# Patient Record
Sex: MALE | Race: WHITE | Employment: OTHER | URBAN - METROPOLITAN AREA
[De-identification: names, ages, dates, MRNs, and addresses within clinical notes are randomized per-mention and may not be internally consistent; named-entity substitution may affect disease eponyms.]

---

## 2017-01-27 ENCOUNTER — OFFICE VISIT (OUTPATIENT)
Dept: PAIN CLINIC | Facility: HOSPITAL | Age: 57
End: 2017-01-27
Attending: ANESTHESIOLOGY
Payer: MEDICARE

## 2017-01-27 VITALS
WEIGHT: 195 LBS | RESPIRATION RATE: 18 BRPM | BODY MASS INDEX: 31.34 KG/M2 | SYSTOLIC BLOOD PRESSURE: 125 MMHG | DIASTOLIC BLOOD PRESSURE: 85 MMHG | HEART RATE: 87 BPM | HEIGHT: 66 IN

## 2017-01-27 DIAGNOSIS — M50.30 DDD (DEGENERATIVE DISC DISEASE), CERVICAL: Primary | ICD-10-CM

## 2017-01-27 PROCEDURE — 99211 OFF/OP EST MAY X REQ PHY/QHP: CPT

## 2017-01-27 RX ORDER — HYDROCODONE BITARTRATE AND ACETAMINOPHEN 10; 325 MG/1; MG/1
1 TABLET ORAL EVERY 6 HOURS PRN
Qty: 90 TABLET | Refills: 0 | Status: SHIPPED | OUTPATIENT
Start: 2017-01-27 | End: 2017-03-24

## 2017-01-27 NOTE — CHRONIC PAIN
Follow-up Note    HISTORY OF PRESENT ILLNESS:  June Barrera is a 60year old old male, originally referred to the pain clinic by  No ref. provider found, returns to the clinic for follow up regarding neck pain.  He reports good relief from cervical ep Arthrocentesis of the right knee joint       FAMILY HISTORY:  Family History   Problem Relation Age of Onset   • Arthritis Mother    • Heart Disorder Mother    • Heart Disorder Brother    • pulmo[other] [OTHER] Father 76     leukemnia       SOCIAL HISTORY: Right Upper Extremity: Normal  Left Upper Extremity: Normal  Edema - Absent  Hoffmans:   negative  Joint Exam: Normal  TPs: Absent: lumbo-sacral paraspinous muscle TPs  Skin - normal     IMAGING:  No new relevant studies     [place] PHYSICIAN MONITORING PROGR

## 2017-01-27 NOTE — PROGRESS NOTES
GOOD SUPPORT SYSTEM    1/27/17 PATIENT PRESENTS AMBULATORY TO CPM; PATIENT STATES 90 % IMPROVEMENT OF MENG DONE 11/21/16; STATES TWO MONTHS OF RELIEF; MENG TOOK ABOUT 5 DAYS FOR IT TO START WORKING; INTERESTED IN GETTING ANOTHER EPIDURAL; PATIENT

## 2017-02-08 RX ORDER — SIMVASTATIN 40 MG
TABLET ORAL
Qty: 30 TABLET | Refills: 0 | Status: SHIPPED | OUTPATIENT
Start: 2017-02-08 | End: 2017-04-06

## 2017-02-23 ENCOUNTER — TELEPHONE (OUTPATIENT)
Dept: INTERNAL MEDICINE CLINIC | Facility: CLINIC | Age: 57
End: 2017-02-23

## 2017-02-23 NOTE — TELEPHONE ENCOUNTER
Medicare AHA scheduled for 4/6/17. Pt is asking for blood test orders. Informed orders are in the system and to fast 8-10 hours prior to lab draw.

## 2017-03-24 NOTE — PROGRESS NOTES
Blood pressure 146/82, pulse 94, weight 196 lb (88.905 kg). Patient presents today following up for his son's imprisonment and detoxification. He is very upset and is not able to sleep at night. He has been smoking again.   He reports his son will come h

## 2017-03-30 PROBLEM — F51.02 INSOMNIA DUE TO PSYCHOLOGICAL STRESS: Status: ACTIVE | Noted: 2017-03-30

## 2017-03-30 PROBLEM — F17.200 TOBACCO DEPENDENCE: Status: ACTIVE | Noted: 2017-03-30

## 2017-03-30 PROBLEM — E78.5 HYPERLIPIDEMIA: Status: ACTIVE | Noted: 2017-03-30

## 2017-04-03 ENCOUNTER — APPOINTMENT (OUTPATIENT)
Dept: LAB | Age: 57
End: 2017-04-03
Attending: FAMILY MEDICINE
Payer: MEDICARE

## 2017-04-03 PROCEDURE — 84450 TRANSFERASE (AST) (SGOT): CPT | Performed by: FAMILY MEDICINE

## 2017-04-03 PROCEDURE — 80061 LIPID PANEL: CPT | Performed by: FAMILY MEDICINE

## 2017-04-03 PROCEDURE — 84460 ALANINE AMINO (ALT) (SGPT): CPT | Performed by: FAMILY MEDICINE

## 2017-04-06 ENCOUNTER — OFFICE VISIT (OUTPATIENT)
Dept: FAMILY MEDICINE CLINIC | Facility: CLINIC | Age: 57
End: 2017-04-06

## 2017-04-06 VITALS
SYSTOLIC BLOOD PRESSURE: 110 MMHG | WEIGHT: 198 LBS | BODY MASS INDEX: 31.82 KG/M2 | HEART RATE: 94 BPM | DIASTOLIC BLOOD PRESSURE: 67 MMHG | HEIGHT: 66 IN

## 2017-04-06 DIAGNOSIS — E78.5 HYPERLIPIDEMIA, UNSPECIFIED HYPERLIPIDEMIA TYPE: ICD-10-CM

## 2017-04-06 DIAGNOSIS — Z87.39 HISTORY OF ARTHRITIS: ICD-10-CM

## 2017-04-06 DIAGNOSIS — Z00.00 MEDICARE ANNUAL WELLNESS VISIT, INITIAL: Primary | ICD-10-CM

## 2017-04-06 DIAGNOSIS — F43.21 GRIEF REACTION: ICD-10-CM

## 2017-04-06 DIAGNOSIS — G89.29 CHRONIC NECK PAIN: ICD-10-CM

## 2017-04-06 DIAGNOSIS — M54.2 CHRONIC NECK PAIN: ICD-10-CM

## 2017-04-06 PROBLEM — M19.90 ARTHRITIS: Status: ACTIVE | Noted: 2017-04-06

## 2017-04-06 PROBLEM — F43.20 GRIEF REACTION: Status: ACTIVE | Noted: 2017-04-06

## 2017-04-06 PROCEDURE — 96160 PT-FOCUSED HLTH RISK ASSMT: CPT | Performed by: FAMILY MEDICINE

## 2017-04-06 PROCEDURE — G0438 PPPS, INITIAL VISIT: HCPCS | Performed by: FAMILY MEDICINE

## 2017-04-06 RX ORDER — SIMVASTATIN 40 MG
TABLET ORAL
Qty: 90 TABLET | Refills: 3 | Status: SHIPPED | OUTPATIENT
Start: 2017-04-06 | End: 2018-05-01

## 2017-04-06 RX ORDER — ASPIRIN 81 MG/1
81 TABLET ORAL DAILY
Qty: 30 TABLET | Refills: 0 | OUTPATIENT
Start: 2017-04-06 | End: 2018-07-18

## 2017-04-06 NOTE — PROGRESS NOTES
HPI:   Roseanne York is a 60year old male who presents for a Medicare Initial Annual Wellness visit (Once after 12 month Medicare anniversery) .       Annual Physical due on 11/22/2017        Patient Care Team: Patient Care Team:  Connor Perez, DO reports that he quit smoking about 15 months ago. His smoking use included Cigarettes. He has a 10 pack-year smoking history. He does not have any smokeless tobacco history on file. He reports that he does not drink alcohol or use illicit drugs.      REVIEW trouble understanding the speaker in a large room such as at a   meeting or place of Quaker:  No   Many people I talk to seem to mumble (or don't speak clearly):  No People   get annoyed because I misunderstand what they say:  No   I misunderstand what ot SUGGESTED VACCINATIONS - Influenza, Pneumococcal, Zoster, Tetanus     There is no immunization history on file for this patient.     ASSESSMENT AND OTHER RELEVANT CHRONIC CONDITIONS:   Sigrid Reese is a 60year old male who presents for a Medi energy level? :  (pt states \"housework\" )    How would you describe your daily physical activity?: Light    How would you describe your current health state?: Good    How do you maintain positive mental well-being? :  (pt states 'bike rides\" )    If you a Assessment\" under Medicare Assessment section in Charting, test patient and document. Then, refresh your progress note to see your input here.   Cognitive Assessment     What day of the week is this?: Correct    What month is it?: Correct    What year i previous visit.          SPECIFIC DISEASE MONITORING Internal Lab or Procedure External Lab or Procedure   Annual Monitoring of Persistent     Medications (ACE/ARB, digoxin diuretics, anticonvulsants.)    Potassium  Annually POTASSIUM (P) (mmol/L)   Date [facility]

## 2017-04-06 NOTE — PATIENT INSTRUCTIONS
Roseanne Rose's SCREENING SCHEDULE   Tests on this list are recommended by your physician but may not be covered, or covered at this frequency, by your insurer. Please check with your insurance carrier before scheduling to verify coverage.     PIERRE 75     Colonoscopy Screen   Covered every 10 years- more often if abnormal Colonoscopy,3 Years due on 11/22/2019 Update Health Maintenance if applicable    Flex Sigmoidoscopy Screen  Covered every 5 years No results found for this or any previous visit.  No prescription benefits, but Medicare does not cover unless Medically needed    Zoster (Not covered by Medicare Part B) No orders found for this or any previous visit.  This may be covered with your pharmacy  prescription benefits     Recommended Websites for

## 2017-04-19 ENCOUNTER — LAB ENCOUNTER (OUTPATIENT)
Dept: LAB | Age: 57
End: 2017-04-19
Attending: FAMILY MEDICINE
Payer: MEDICARE

## 2017-04-19 DIAGNOSIS — Z87.39 HISTORY OF ARTHRITIS: ICD-10-CM

## 2017-04-19 DIAGNOSIS — Z00.00 MEDICARE ANNUAL WELLNESS VISIT, INITIAL: ICD-10-CM

## 2017-04-19 PROCEDURE — 86431 RHEUMATOID FACTOR QUANT: CPT

## 2017-04-19 PROCEDURE — 36415 COLL VENOUS BLD VENIPUNCTURE: CPT

## 2017-04-19 PROCEDURE — 85025 COMPLETE CBC W/AUTO DIFF WBC: CPT

## 2017-04-19 PROCEDURE — 93005 ELECTROCARDIOGRAM TRACING: CPT

## 2017-04-19 PROCEDURE — 86200 CCP ANTIBODY: CPT

## 2017-04-19 PROCEDURE — 93010 ELECTROCARDIOGRAM REPORT: CPT | Performed by: FAMILY MEDICINE

## 2017-04-19 NOTE — TELEPHONE ENCOUNTER
May we refill Clonazepam? Last fill 3/24/17.      Refill Protocol Appointment Criteria  · Appointment scheduled in the past 6 months or in the next 3 months  Recent Visits       Provider Department Primary Dx    1 week ago DO Marlon Wakefield

## 2017-04-19 NOTE — TELEPHONE ENCOUNTER
Current Outpatient Prescriptions:  ClonazePAM 0.5 MG Oral Tab TAKE 1-2 TABS NIGHTLY AS NEEDED FOR SLEEP.  Disp: 30 tablet Rfl: 0     Patient requesting refill for meds, Please call when ready

## 2017-04-20 RX ORDER — CLONAZEPAM 0.5 MG/1
TABLET ORAL
Qty: 30 TABLET | Refills: 0 | Status: SHIPPED | OUTPATIENT
Start: 2017-04-20 | End: 2017-10-30

## 2017-04-21 NOTE — TELEPHONE ENCOUNTER
Rx called in to pharmacy.      Approved          Disp Refills Start End       ClonazePAM 0.5 MG Oral Tab 30 tablet 0 4/20/2017        Sig:  TAKE 1-2 TABS NIGHTLY AS NEEDED FOR SLEEP.        Class:  Script printed       CORIE:  No       Authorizing Provider:

## 2017-04-25 ENCOUNTER — TELEPHONE (OUTPATIENT)
Dept: FAMILY MEDICINE CLINIC | Facility: CLINIC | Age: 57
End: 2017-04-25

## 2017-05-22 RX ORDER — ESCITALOPRAM OXALATE 10 MG/1
TABLET ORAL
Qty: 30 TABLET | Refills: 1 | Status: SHIPPED | OUTPATIENT
Start: 2017-05-22 | End: 2017-07-24

## 2017-05-22 NOTE — TELEPHONE ENCOUNTER
Signed Prescriptions Disp Refills    ESCITALOPRAM 10 MG Oral Tab 30 tablet 1      Sig: TAKE 1 TABLET BY MOUTH EVERY DAY        Authorizing Provider: Kaylen DAVIS        Ordering User: Genna Mendez           Refill approved per protocol.

## 2017-05-22 NOTE — TELEPHONE ENCOUNTER
Refill Protocol Appointment Criteria  · Appointment scheduled in the past 6 months or in the next 3 months  Recent Visits       Provider Department Primary Dx    1 month ago Robson Hope DO [facility], [address], [place] Medicare annual wellne

## 2017-07-24 RX ORDER — ESCITALOPRAM OXALATE 10 MG/1
TABLET ORAL
Qty: 90 TABLET | Refills: 3 | Status: SHIPPED | OUTPATIENT
Start: 2017-07-24 | End: 2018-10-03

## 2017-08-19 ENCOUNTER — TELEPHONE (OUTPATIENT)
Dept: FAMILY MEDICINE CLINIC | Facility: CLINIC | Age: 57
End: 2017-08-19

## 2017-08-21 RX ORDER — EPINEPHRINE 0.3 MG/.3ML
0.3 INJECTION SUBCUTANEOUS ONCE
Qty: 1 EACH | Refills: 0 | Status: SHIPPED | OUTPATIENT
Start: 2017-08-21 | End: 2019-09-13

## 2017-09-19 ENCOUNTER — TELEPHONE (OUTPATIENT)
Dept: FAMILY MEDICINE CLINIC | Facility: CLINIC | Age: 57
End: 2017-09-19

## 2017-09-19 DIAGNOSIS — M50.30 DDD (DEGENERATIVE DISC DISEASE), CERVICAL: Primary | ICD-10-CM

## 2017-09-19 NOTE — TELEPHONE ENCOUNTER
Patient needs referral to pain center for office visit as he is seeking cortisone injection. Please sign referral order if you agree, Thank you.

## 2017-10-30 ENCOUNTER — OFFICE VISIT (OUTPATIENT)
Dept: PAIN CLINIC | Facility: HOSPITAL | Age: 57
End: 2017-10-30
Attending: ANESTHESIOLOGY
Payer: MEDICARE

## 2017-10-30 VITALS
DIASTOLIC BLOOD PRESSURE: 83 MMHG | SYSTOLIC BLOOD PRESSURE: 131 MMHG | HEIGHT: 66 IN | WEIGHT: 200 LBS | BODY MASS INDEX: 32.14 KG/M2 | RESPIRATION RATE: 18 BRPM | HEART RATE: 77 BPM

## 2017-10-30 DIAGNOSIS — M50.30 DDD (DEGENERATIVE DISC DISEASE), CERVICAL: Primary | ICD-10-CM

## 2017-10-30 PROCEDURE — 99211 OFF/OP EST MAY X REQ PHY/QHP: CPT

## 2017-10-30 RX ORDER — HYDROCODONE BITARTRATE AND ACETAMINOPHEN 10; 325 MG/1; MG/1
1 TABLET ORAL EVERY 6 HOURS PRN
Qty: 90 TABLET | Refills: 0 | Status: SHIPPED | OUTPATIENT
Start: 2017-10-30 | End: 2017-11-29

## 2017-10-30 NOTE — CHRONIC PAIN
Graciela Frost is well known to the CPM with intermittent low back and neck pain from degenerative changes of his cervical and lumbar spine due to known rheumatoid arthritis and occupational and recreational activities.   He has intermittent exacerbations of

## 2018-01-11 ENCOUNTER — TELEPHONE (OUTPATIENT)
Dept: FAMILY MEDICINE CLINIC | Facility: CLINIC | Age: 58
End: 2018-01-11

## 2018-02-12 ENCOUNTER — TELEPHONE (OUTPATIENT)
Dept: FAMILY MEDICINE CLINIC | Facility: CLINIC | Age: 58
End: 2018-02-12

## 2018-03-07 ENCOUNTER — PATIENT OUTREACH (OUTPATIENT)
Dept: INTERNAL MEDICINE CLINIC | Facility: CLINIC | Age: 58
End: 2018-03-07

## 2018-04-24 ENCOUNTER — OFFICE VISIT (OUTPATIENT)
Dept: PAIN CLINIC | Facility: HOSPITAL | Age: 58
End: 2018-04-24
Attending: ANESTHESIOLOGY
Payer: MEDICARE

## 2018-04-24 ENCOUNTER — ANESTHESIA (OUTPATIENT)
Dept: PAIN CLINIC | Facility: HOSPITAL | Age: 58
End: 2018-04-24

## 2018-04-24 VITALS
WEIGHT: 200 LBS | RESPIRATION RATE: 18 BRPM | BODY MASS INDEX: 32.14 KG/M2 | DIASTOLIC BLOOD PRESSURE: 44 MMHG | HEIGHT: 66 IN | SYSTOLIC BLOOD PRESSURE: 126 MMHG

## 2018-04-24 DIAGNOSIS — R29.898 WEAKNESS OF BOTH ARMS: Primary | ICD-10-CM

## 2018-04-24 PROCEDURE — 99211 OFF/OP EST MAY X REQ PHY/QHP: CPT

## 2018-04-24 RX ORDER — HYDROCODONE BITARTRATE AND ACETAMINOPHEN 10; 325 MG/1; MG/1
1 TABLET ORAL EVERY 6 HOURS PRN
Qty: 120 TABLET | Refills: 0 | Status: SHIPPED | OUTPATIENT
Start: 2018-04-24 | End: 2018-05-24

## 2018-04-24 NOTE — CHRONIC PAIN
True Ordonez is well known to the CPM with intermittent low back and neck pain from degenerative changes of his cervical and lumbar spine due to known rheumatoid arthritis and occupational and recreational activities.   He has intermittent exacerbations of

## 2018-04-24 NOTE — PROGRESS NOTES
GOOD SUPPORT SYSTEM  1/27/17 PATIENT PRESENTS AMBULATORY TO CPM; PATIENT STATES 90 % IMPROVEMENT OF MENG DONE 11/21/16; STATES TWO MONTHS OF RELIEF; MENG TOOK ABOUT 5 DAYS FOR IT TO START WORKING; INTERESTED IN GETTING ANOTHER EPIDURAL; PATIENT IS

## 2018-04-26 ENCOUNTER — ANESTHESIA EVENT (OUTPATIENT)
Dept: PAIN CLINIC | Facility: HOSPITAL | Age: 58
End: 2018-04-26

## 2018-04-27 ENCOUNTER — TELEPHONE (OUTPATIENT)
Dept: OTHER | Age: 58
End: 2018-04-27

## 2018-04-27 ENCOUNTER — TELEPHONE (OUTPATIENT)
Dept: FAMILY MEDICINE CLINIC | Facility: CLINIC | Age: 58
End: 2018-04-27

## 2018-04-27 DIAGNOSIS — M79.601 BILATERAL ARM PAIN: Primary | ICD-10-CM

## 2018-04-27 DIAGNOSIS — M79.602 BILATERAL ARM PAIN: Primary | ICD-10-CM

## 2018-04-27 NOTE — TELEPHONE ENCOUNTER
EMG/NCV ORDERED. PLEASE CHECK WITH DR. Ashley Barnhart IF THIS IS THE ORDER SHE WANTED. ALSO PLEASE HAVE PATIENT SCHEDULE HIS MEDICARE ANNUAL PHYSICAL WITH ME THIS IN MAY OR June.

## 2018-04-27 NOTE — TELEPHONE ENCOUNTER
Managed Care--can patient schedule EMG?  Ordered 4/24/18    Please advise    Relayed to wife CSS # to call to schedule test  [facility] Visit     4/24/2018  [facility]   Ekta Dillon MD   Anesthesiology   Weakness of both

## 2018-04-27 NOTE — TELEPHONE ENCOUNTER
Please call patient for EMG scheduling--asking if test authorized (has HMO), as test was ordered by Dr. Babar Vail, not Dr. Alex Dee. Called [facility] pain mgmt office and Neuroscience office--already closed for the day.     Spoke with wife Tammy Jackson and relayed to

## 2018-04-27 NOTE — TELEPHONE ENCOUNTER
Spoke with wife--she states she will call Dr. Noelle Adler office on [address] gave her the number. They are not sure how to proceed.     It shows that order was placed by Dr. Joann Crump, but wife states her  told her that Dr. Joann Crump told him that [facility] [place] has to ord

## 2018-05-01 DIAGNOSIS — E78.00 PURE HYPERCHOLESTEROLEMIA: Primary | ICD-10-CM

## 2018-05-01 RX ORDER — SIMVASTATIN 40 MG
TABLET ORAL
Qty: 90 TABLET | Refills: 0 | Status: SHIPPED | OUTPATIENT
Start: 2018-05-01 | End: 2018-08-05

## 2018-05-01 NOTE — TELEPHONE ENCOUNTER
Appointment Information   Name: Danii Allison MRN: [id number]   Date: 5/16/2018 Status: Pablo   Appt Time: 3:00 PM Length: 60   Visit Type: EMG 2 LIMB [6571] Copay: $0.00   Provider: Usama Inman DO Department: EMG NEURO [facility]   Referral Number:   Re

## 2018-05-03 NOTE — TELEPHONE ENCOUNTER
Confirm with  office order placed for EMG is correct. Spoke to pt. Notified pt of New EMG order. Also notified pt to please schedule for his annual medicare physical with . He states he will call back at a later time to schedule. Pt voiced

## 2018-05-16 ENCOUNTER — OFFICE VISIT (OUTPATIENT)
Dept: NEUROLOGY | Facility: CLINIC | Age: 58
End: 2018-05-16

## 2018-05-16 ENCOUNTER — TELEPHONE (OUTPATIENT)
Dept: PAIN CLINIC | Facility: HOSPITAL | Age: 58
End: 2018-05-16

## 2018-05-16 VITALS — HEIGHT: 66 IN | BODY MASS INDEX: 32.14 KG/M2 | WEIGHT: 200 LBS

## 2018-05-16 DIAGNOSIS — R20.0 NUMBNESS IN BOTH HANDS: ICD-10-CM

## 2018-05-16 PROCEDURE — 95911 NRV CNDJ TEST 9-10 STUDIES: CPT | Performed by: PHYSICAL MEDICINE & REHABILITATION

## 2018-05-16 PROCEDURE — 95886 MUSC TEST DONE W/N TEST COMP: CPT | Performed by: PHYSICAL MEDICINE & REHABILITATION

## 2018-05-16 NOTE — PROCEDURES
[address]  Phone: [phone]  Fax: [phone] REPORT          Patient: Edmund Rodriguez YOB: 1960  Patient ID: [id number] Hand Dominance: right abnormal in 1 muscle(s), with the following distribution:  • The R. DELTOID had abnormal spontaneous activity. Conclusion:     1) This is an abnormal electrodiagnostic study.   2) There is electrophysiologic evidence of a bilateral mild median mono II 3.39 4.27 10.7 18.4 Wrist - Dig II 14 41   R ULNAR - Digit  V      Wrist Dig V 2.76 3.44 5.9 13.3 Wrist - Dig V 14 51   L ULNAR - Digit  V      Wrist Dig V 2.76 3.49 8.2 18.4 Wrist - Dig V 14 51   L RADIAL - Wrist      Forearm Wrist 1.61 2.24 17.0 22.7

## 2018-05-18 ENCOUNTER — TELEPHONE (OUTPATIENT)
Dept: PAIN CLINIC | Facility: HOSPITAL | Age: 58
End: 2018-05-18

## 2018-06-25 ENCOUNTER — TELEPHONE (OUTPATIENT)
Dept: PAIN CLINIC | Facility: HOSPITAL | Age: 58
End: 2018-06-25

## 2018-06-25 DIAGNOSIS — M79.641 HAND PAIN, RIGHT: Primary | ICD-10-CM

## 2018-06-26 ENCOUNTER — HOSPITAL ENCOUNTER (OUTPATIENT)
Dept: GENERAL RADIOLOGY | Age: 58
Discharge: HOME | End: 2018-06-26
Attending: ANESTHESIOLOGY
Payer: MEDICARE

## 2018-06-26 DIAGNOSIS — M79.641 HAND PAIN, RIGHT: ICD-10-CM

## 2018-06-26 PROCEDURE — 73130 X-RAY EXAM OF HAND: CPT | Performed by: ANESTHESIOLOGY

## 2018-06-29 ENCOUNTER — TELEPHONE (OUTPATIENT)
Dept: PAIN CLINIC | Facility: HOSPITAL | Age: 58
End: 2018-06-29

## 2018-06-29 NOTE — TELEPHONE ENCOUNTER
PARDEEP COMPLETED XRAY; PLS REVIEW & CALL PT WITH RESULTS;  DO YOU WANT HIM TO MAKE AN APPOINTMENT OR SET UP INJECTION

## 2018-07-09 ENCOUNTER — TELEPHONE (OUTPATIENT)
Dept: PAIN CLINIC | Facility: HOSPITAL | Age: 58
End: 2018-07-09

## 2018-07-16 ENCOUNTER — TELEPHONE (OUTPATIENT)
Dept: PAIN CLINIC | Facility: HOSPITAL | Age: 58
End: 2018-07-16

## 2018-07-17 ENCOUNTER — TELEPHONE (OUTPATIENT)
Dept: PAIN CLINIC | Facility: HOSPITAL | Age: 58
End: 2018-07-17

## 2018-07-18 ENCOUNTER — OFFICE VISIT (OUTPATIENT)
Dept: PAIN CLINIC | Facility: HOSPITAL | Age: 58
End: 2018-07-18
Attending: ANESTHESIOLOGY
Payer: MEDICARE

## 2018-07-18 DIAGNOSIS — G89.4 CHRONIC PAIN SYNDROME: Primary | ICD-10-CM

## 2018-07-18 PROCEDURE — 99211 OFF/OP EST MAY X REQ PHY/QHP: CPT

## 2018-07-18 RX ORDER — HYDROCODONE BITARTRATE AND ACETAMINOPHEN 10; 325 MG/1; MG/1
1 TABLET ORAL EVERY 6 HOURS PRN
COMMUNITY
End: 2018-07-18

## 2018-07-18 RX ORDER — METHYLPREDNISOLONE 4 MG/1
TABLET ORAL
Qty: 21 TABLET | Refills: 0 | Status: SHIPPED | OUTPATIENT
Start: 2018-07-18 | End: 2018-07-24

## 2018-07-18 RX ORDER — HYDROCODONE BITARTRATE AND ACETAMINOPHEN 10; 325 MG/1; MG/1
1 TABLET ORAL EVERY 6 HOURS PRN
Qty: 120 TABLET | Refills: 0 | Status: SHIPPED | OUTPATIENT
Start: 2018-07-18 | End: 2018-08-17

## 2018-07-18 NOTE — CHRONIC PAIN
MEDICATION EVALUATION  HISTORY OF PRESENT ILLNESS:  Elijah Gery is a 59year old old male with history of Chronic pain syndrome  (primary encounter diagnosis) who returns for medication evaluation. Patient continues to report right thumb pain.  He also reaction     Chronic neck pain    Past Medical History:   Diagnosis Date   • Back problem    • Broken neck (HCC)    • Rheumatoid arteritis        SURGICAL HISTORY:  Past Surgical History:  No date: KNEE ARTHROSCOPY      Comment: Right knee arthroscopy  No Quadriceps 5/5 5/5   Foot DF 5/5 5/5   Foot EHL 5/5 5/5   Gastrocnemius 5/5 5/5       Temperature:  normal to touch bilateral upper and lower extremities  Sensation (light touch/pinprick/temperature):   Right Lower Extremity:  Normal  Left Lower Extremit Patient verbalized understanding.  Informed patient that no refills will be given over the phone. Instructed patient he is responsible for medications and refills. Patient verbalized understanding.      Pt will return to clinic for follow up if pain in low

## 2018-07-24 ENCOUNTER — OFFICE VISIT (OUTPATIENT)
Dept: FAMILY MEDICINE CLINIC | Facility: CLINIC | Age: 58
End: 2018-07-24

## 2018-07-24 VITALS
DIASTOLIC BLOOD PRESSURE: 78 MMHG | WEIGHT: 207 LBS | HEIGHT: 66 IN | SYSTOLIC BLOOD PRESSURE: 138 MMHG | BODY MASS INDEX: 33.27 KG/M2 | HEART RATE: 80 BPM

## 2018-07-24 DIAGNOSIS — G89.29 CHRONIC PAIN OF RIGHT THUMB: Primary | ICD-10-CM

## 2018-07-24 DIAGNOSIS — N52.8 OTHER MALE ERECTILE DYSFUNCTION: ICD-10-CM

## 2018-07-24 DIAGNOSIS — M79.644 CHRONIC PAIN OF RIGHT THUMB: Primary | ICD-10-CM

## 2018-07-24 PROCEDURE — 99213 OFFICE O/P EST LOW 20 MIN: CPT | Performed by: FAMILY MEDICINE

## 2018-07-24 PROCEDURE — G0463 HOSPITAL OUTPT CLINIC VISIT: HCPCS | Performed by: FAMILY MEDICINE

## 2018-07-24 RX ORDER — METHYLPREDNISOLONE 4 MG/1
TABLET ORAL
Qty: 21 TABLET | Refills: 0 | Status: SHIPPED | OUTPATIENT
Start: 2018-07-24 | End: 2018-07-31

## 2018-07-24 NOTE — PROGRESS NOTES
Blood pressure 138/78, pulse 80, height 5' 6\" (1.676 m), weight 207 lb (93.9 kg). Presents today complaining of low back pain that began when he was working on his bicycle. He has chronic low back pain issues.   He denies pain down his legs at this arnaldo

## 2018-07-27 ENCOUNTER — OFFICE VISIT (OUTPATIENT)
Dept: FAMILY MEDICINE CLINIC | Facility: CLINIC | Age: 58
End: 2018-07-27

## 2018-07-27 ENCOUNTER — TELEPHONE (OUTPATIENT)
Dept: OTHER | Age: 58
End: 2018-07-27

## 2018-07-27 VITALS
WEIGHT: 205 LBS | HEIGHT: 66 IN | SYSTOLIC BLOOD PRESSURE: 145 MMHG | HEART RATE: 79 BPM | RESPIRATION RATE: 16 BRPM | DIASTOLIC BLOOD PRESSURE: 87 MMHG | BODY MASS INDEX: 32.95 KG/M2

## 2018-07-27 DIAGNOSIS — M54.50 ACUTE BILATERAL LOW BACK PAIN WITHOUT SCIATICA: Primary | ICD-10-CM

## 2018-07-27 PROCEDURE — 99213 OFFICE O/P EST LOW 20 MIN: CPT | Performed by: FAMILY MEDICINE

## 2018-07-27 PROCEDURE — G0463 HOSPITAL OUTPT CLINIC VISIT: HCPCS | Performed by: FAMILY MEDICINE

## 2018-07-27 RX ORDER — PREDNISONE 20 MG/1
20 TABLET ORAL
Qty: 15 TABLET | Refills: 0 | Status: SHIPPED | OUTPATIENT
Start: 2018-07-27 | End: 2018-07-31

## 2018-07-27 NOTE — PROGRESS NOTES
HPI:    Patient ID: Jada Calixto is a 59year old male. HPI    Review of Systems         Current Outpatient Prescriptions:  predniSONE 20 MG Oral Tab Take 1 tablet (20 mg total) by mouth 3 (three) times daily.  Disp: 15 tablet Rfl: 0   Diclofenac Sodium

## 2018-07-27 NOTE — TELEPHONE ENCOUNTER
Pt informed of [facility]'s response and states can make it to [facility] by 10:20 am. CSS, please add pt accordingly at 10:20 am with [facility] [place] today as RN unable to reach  to assist in adding.

## 2018-07-27 NOTE — PROGRESS NOTES
Blood pressure 145/87, pulse 79, resp. rate 16, height 5' 6\" (1.676 m), weight 205 lb (93 kg). Following up for back pain reports that he does well for the first 2 days of the Medrol Dosepak that he relies on Norco for pain relief.   He does not like to

## 2018-07-27 NOTE — TELEPHONE ENCOUNTER
Please advise. Thank you.  Please reply to pool: EM RN TRIAGE    Pt and wife Sheela Sánchez) calling on the same line  (Name and  of pt verified) with condition update as instructed per MD. Pt states that his low back pain is slightly improved (7/10 on pain s

## 2018-08-07 RX ORDER — SIMVASTATIN 40 MG
TABLET ORAL
Qty: 90 TABLET | Refills: 0 | Status: SHIPPED | OUTPATIENT
Start: 2018-08-07 | End: 2018-10-03

## 2018-09-13 ENCOUNTER — OFFICE VISIT (OUTPATIENT)
Dept: SURGERY | Facility: CLINIC | Age: 58
End: 2018-09-13

## 2018-09-13 DIAGNOSIS — M79.641 PAIN IN RIGHT HAND: Primary | ICD-10-CM

## 2018-09-13 DIAGNOSIS — M19.041 PRIMARY OSTEOARTHRITIS OF RIGHT HAND: ICD-10-CM

## 2018-09-13 PROCEDURE — 99203 OFFICE O/P NEW LOW 30 MIN: CPT | Performed by: PLASTIC SURGERY

## 2018-09-13 PROCEDURE — G0463 HOSPITAL OUTPT CLINIC VISIT: HCPCS | Performed by: PLASTIC SURGERY

## 2018-09-13 NOTE — H&P
Edwar Mack is a 59year old male that presents with Patient presents with:  Pain: R TH  .    REFERRED BY:  Olena Ibarra      Pacemaker: No  Latex Allergy: no  Coumadin: No  Plavix: No  Other anticoagulants: No  Cardiac stents: No    HAND DOMINANCE: ARTHROSCOPY      Comment:  Right knee arthroscopy  No date: OTHER SURGICAL HISTORY      Comment:  Arthrocentesis of the right knee joint  Social History    Socioeconomic History      Marital status:       Spouse name: Not on file      Number of chil Not on file    Family History   Problem Relation Age of Onset   • Arthritis Mother    • Heart Disorder Mother    • Other (pulmo) Father 76        leukemnia   • Heart Disorder Brother        PHYSICAL EXAM:   CONSTITUTIONAL: Overall appearance - Normal  HEEN

## 2018-09-17 ENCOUNTER — OFFICE VISIT (OUTPATIENT)
Dept: SURGERY | Facility: CLINIC | Age: 58
End: 2018-09-17

## 2018-09-17 DIAGNOSIS — M79.601 PAIN OF RIGHT UPPER EXTREMITY: ICD-10-CM

## 2018-09-17 DIAGNOSIS — M25.641 JOINT STIFFNESS OF HAND, RIGHT: Primary | ICD-10-CM

## 2018-09-17 PROCEDURE — 97166 OT EVAL MOD COMPLEX 45 MIN: CPT | Performed by: OCCUPATIONAL THERAPIST

## 2018-09-17 PROCEDURE — 97018 PARAFFIN BATH THERAPY: CPT | Performed by: OCCUPATIONAL THERAPIST

## 2018-09-17 NOTE — PROGRESS NOTES
OCCUPATIONAL THERAPY EVALUATION:   True Ordonez   [id number]       SUBJECTIVE:    HX of Injury: Right thumb pain. Chief Complaint:   Right thumb pain.     Precautions: None  Premorbid Functional Status: Independent w/ Occ. duties, Independent w/ driving / the plan of care. MIKY Beaver     I have reviewed the treatment plan and concur.    Jj Barajas MD

## 2018-09-20 ENCOUNTER — APPOINTMENT (OUTPATIENT)
Dept: SURGERY | Facility: CLINIC | Age: 58
End: 2018-09-20

## 2018-09-20 DIAGNOSIS — M25.641 JOINT STIFFNESS OF HAND, RIGHT: Primary | ICD-10-CM

## 2018-09-20 DIAGNOSIS — M79.601 PAIN OF RIGHT UPPER EXTREMITY: ICD-10-CM

## 2018-09-20 PROCEDURE — 97018 PARAFFIN BATH THERAPY: CPT | Performed by: OCCUPATIONAL THERAPIST

## 2018-09-20 PROCEDURE — 97022 WHIRLPOOL THERAPY: CPT | Performed by: OCCUPATIONAL THERAPIST

## 2018-09-24 NOTE — PROGRESS NOTES
Subjective: I am doing much better, glad I came. Objective:     Current level of performance:  ADL: Independent with self care task needs. Work: Retired [employer]  Leisure: Builds Electric Bikes. Measurements/Tests:  ROM:         WFL.          Treatmen

## 2018-10-03 ENCOUNTER — OFFICE VISIT (OUTPATIENT)
Dept: FAMILY MEDICINE CLINIC | Facility: CLINIC | Age: 58
End: 2018-10-03

## 2018-10-03 VITALS
SYSTOLIC BLOOD PRESSURE: 122 MMHG | HEIGHT: 66 IN | WEIGHT: 193.25 LBS | DIASTOLIC BLOOD PRESSURE: 76 MMHG | HEART RATE: 82 BPM | BODY MASS INDEX: 31.06 KG/M2

## 2018-10-03 DIAGNOSIS — Z00.00 MEDICARE ANNUAL WELLNESS VISIT, INITIAL: ICD-10-CM

## 2018-10-03 DIAGNOSIS — Z00.00 ENCOUNTER FOR ANNUAL HEALTH EXAMINATION: ICD-10-CM

## 2018-10-03 DIAGNOSIS — M47.812 FACET ARTHRITIS OF CERVICAL REGION: ICD-10-CM

## 2018-10-03 DIAGNOSIS — R53.83 OTHER FATIGUE: ICD-10-CM

## 2018-10-03 DIAGNOSIS — Z12.5 PROSTATE CANCER SCREENING: ICD-10-CM

## 2018-10-03 DIAGNOSIS — E78.00 PURE HYPERCHOLESTEROLEMIA: Primary | ICD-10-CM

## 2018-10-03 DIAGNOSIS — M47.816 FACET ARTHRITIS OF LUMBAR REGION: ICD-10-CM

## 2018-10-03 PROCEDURE — G0439 PPPS, SUBSEQ VISIT: HCPCS | Performed by: FAMILY MEDICINE

## 2018-10-03 PROCEDURE — 90686 IIV4 VACC NO PRSV 0.5 ML IM: CPT | Performed by: FAMILY MEDICINE

## 2018-10-03 PROCEDURE — G0008 ADMIN INFLUENZA VIRUS VAC: HCPCS | Performed by: FAMILY MEDICINE

## 2018-10-03 PROCEDURE — 96160 PT-FOCUSED HLTH RISK ASSMT: CPT | Performed by: FAMILY MEDICINE

## 2018-10-03 NOTE — PATIENT INSTRUCTIONS
Fall Prevention  Falls often occur due to slipping, tripping or losing your balance. Millions of people fall every year and injure themselves. Here are ways to reduce your risk of falling again.   · Think about your fall, was there anything that caused yo · Use night lights. · Go over all your medicines with a pharmacist or other healthcare provider to see if any of them could make you more likely to fall. Date Last Reviewed: 4/1/2018  © 3256-5641 [employer] [address]  [address] [facility] Electrocardiogram date04/19/2017 Routine EKG is not a screening covered service except at the [place] to Medicare Visit    Abdominal aortic aneurysm screening (once between ages 73-68)  No results found for this or any previous visit.  Limited to patients Hepatitis B for Moderate/High Risk No orders found for this or any previous visit.  Medium/high risk factors:   End-stage renal disease   Hemophiliacs who received Factor VIII or IX concentrates   Clients of institutions for the mentally retarded   Persons

## 2018-10-03 NOTE — PROGRESS NOTES
HPI:   Leo Sinclair is a 59year old male who presents for a Medicare Subsequent Annual Wellness visit (Pt already had Initial Annual Wellness).       His last annual assessment has been over 1 year: Annual Physical due on 04/06/2018         Fall/Risk [place] was screened for Alcohol abuse and had a score of 0 so is at low risk.      Patient Care Team: Patient Care Team:  Francy Hardy DO as PCP - General (Family Medicine)  Josué Menjivar MD (Anesthesiology)  Juma Lai MD (Anesthesiology)    Tere Narayan reports that he does not drink alcohol or use drugs.      REVIEW OF SYSTEMS:   GENERAL: feels well otherwise  SKIN: denies any unusual skin lesions  EYES: denies blurred vision or double vision  HEENT: denies nasal congestion, sinus pain or ST  LUNGS: denie what they say:  No   I misunderstand what others are saying and make inappropriate responses:  No I avoid social activities because I cannot hear well and fear I will reply improperly:  No   Family members and friends have told me they think I may have hea 59year old male who presents for a Medicare Assessment. PLAN SUMMARY:   Diagnoses and all orders for this visit:    Pure hypercholesterolemia  -     TSH W REFLEX TO FREE T4; Future  -     LIPID PANEL; Future  -     AST (SGOT);  Future  -     ALT (SGPT) Screen every 10 years Colonoscopy due on 11/22/2019 Update [facility] if applicable    Flex Sigmoidoscopy Screen every 10 years No results found for this or any previous visit. No flowsheet data found.      Fecal Occult Blood Annually No results fou wellness visit, initial    - EKG 12-LEAD  - OPTOMETRY - INTERNAL    5. Facet arthritis of cervical region ([address]  TYLNENOL PRN     6.  Facet arthritis of lumbar region ([address]  TYLENOL PRN

## 2018-12-10 ENCOUNTER — OFFICE VISIT (OUTPATIENT)
Dept: PAIN CLINIC | Facility: HOSPITAL | Age: 58
End: 2018-12-10
Attending: ANESTHESIOLOGY
Payer: MEDICARE

## 2018-12-10 VITALS
WEIGHT: 200 LBS | RESPIRATION RATE: 18 BRPM | HEART RATE: 87 BPM | BODY MASS INDEX: 32.14 KG/M2 | HEIGHT: 66 IN | SYSTOLIC BLOOD PRESSURE: 132 MMHG | DIASTOLIC BLOOD PRESSURE: 86 MMHG

## 2018-12-10 DIAGNOSIS — M18.11 DEGENERATIVE ARTHRITIS OF THUMB, RIGHT: Primary | ICD-10-CM

## 2018-12-10 PROCEDURE — 99211 OFF/OP EST MAY X REQ PHY/QHP: CPT

## 2018-12-10 RX ORDER — HYDROCODONE BITARTRATE AND ACETAMINOPHEN 10; 325 MG/1; MG/1
1 TABLET ORAL EVERY 8 HOURS PRN
Qty: 90 TABLET | Refills: 0 | Status: SHIPPED | OUTPATIENT
Start: 2018-12-10 | End: 2019-01-09

## 2018-12-10 NOTE — CHRONIC PAIN
True Ordonez is well known to the CPM with intermittent low back and neck pain from degenerative changes of his cervical and lumbar spine due to known rheumatoid arthritis and occupational and recreational activities.   He has intermittent exacerbations of as-needed basis when his pain was severe.

## 2018-12-10 NOTE — PROGRESS NOTES
12/10/18  PRESENTS AMBULATORY TO CPM;  RETURNS-LAST O.V. 7/18/18  FOR CHRONIC NECK PAIN;  PT C/O THUMB PAIN  5/10;  \"MY NECK IS GOOD, ITS MY RT THUMB\";  PT STATES HE SAW A HAND SPECIALIST , WAS TOLD ARTHRITIS;  PT ALSO PURCHASED A HOT WAX MACHINE WHICH G

## 2018-12-19 ENCOUNTER — OFFICE VISIT (OUTPATIENT)
Dept: PHYSICAL THERAPY | Age: 58
End: 2018-12-19
Attending: FAMILY MEDICINE
Payer: MEDICARE

## 2018-12-19 DIAGNOSIS — M18.11 DEGENERATIVE ARTHRITIS OF THUMB, RIGHT: ICD-10-CM

## 2018-12-19 PROCEDURE — 97110 THERAPEUTIC EXERCISES: CPT | Performed by: OCCUPATIONAL THERAPIST

## 2018-12-19 PROCEDURE — 97165 OT EVAL LOW COMPLEX 30 MIN: CPT | Performed by: OCCUPATIONAL THERAPIST

## 2018-12-19 NOTE — PROGRESS NOTES
OCCUPATIONAL THERAPY UPPER EXTREMITY EVALUATION   Referring Physician: Dr. Marina Rodriguez  Diagnosis: Degenerative arthritis of thumb, right (M18.11)      Date of onset: June 2018 Date of Service: 12/19/2018       PATIENT SUMMARY   Sundeep Laguerre is a 59year old y medical, social and therapy history as well as prior level of function.     Precautions: None      (Reviewed precautions with patient including post surgical status, pacemaker, diabetes and so forth, if none selected patient reports no applicable conditions Total Treatment Time: 45 min       PLAN OF CARE   Goals:    1. Pt will be independent and compliant with comprehensive HEP to maximize progress achieved in OT.   2. Pt complaints of pain will decrease at worst to 0-1/10 in order to return to ADL's with gre Date____________________    Certification From: 65/37/6988  To:3/19/2019

## 2019-03-21 ENCOUNTER — PATIENT OUTREACH (OUTPATIENT)
Dept: CASE MANAGEMENT | Age: 59
End: 2019-03-21

## 2019-03-21 NOTE — PROGRESS NOTES
Outreached to patient in regards to scheduling Medicare Annual exam (AHA). Patient did not want to schedule right now, he stated he just saw doctor in October for this and too soon to schedule.  Patient said he will call back to schedule when closer to due

## 2019-07-23 ENCOUNTER — OFFICE VISIT (OUTPATIENT)
Dept: PAIN CLINIC | Facility: HOSPITAL | Age: 59
End: 2019-07-23
Attending: ANESTHESIOLOGY
Payer: MEDICARE

## 2019-07-23 VITALS
RESPIRATION RATE: 18 BRPM | SYSTOLIC BLOOD PRESSURE: 129 MMHG | BODY MASS INDEX: 32.14 KG/M2 | HEIGHT: 66 IN | WEIGHT: 200 LBS | HEART RATE: 83 BPM | DIASTOLIC BLOOD PRESSURE: 81 MMHG

## 2019-07-23 DIAGNOSIS — M50.30 DDD (DEGENERATIVE DISC DISEASE), CERVICAL: ICD-10-CM

## 2019-07-23 DIAGNOSIS — M18.11 DEGENERATIVE ARTHRITIS OF THUMB, RIGHT: Primary | ICD-10-CM

## 2019-07-23 PROCEDURE — 99211 OFF/OP EST MAY X REQ PHY/QHP: CPT

## 2019-07-23 RX ORDER — HYDROCODONE BITARTRATE AND ACETAMINOPHEN 10; 325 MG/1; MG/1
1 TABLET ORAL EVERY 8 HOURS PRN
Qty: 90 TABLET | Refills: 0 | Status: SHIPPED | OUTPATIENT
Start: 2019-07-23 | End: 2020-08-19

## 2019-07-23 NOTE — CHRONIC PAIN
InHISTORY OF PRESENT ILLNESS:  Dennis Mackey is a 25-year-old man known to the Center for pain management with chronic neck and low back pain from degenerative changes and bulging disks.   His back pain is a result of rheumatoid arthritis and traumatic arth Negative  Musculoskeletal: As above  Neurological: As above    MEDICAL HISTORY:  Patient Active Problem List:     Neck pain     Hyperlipidemia     Tobacco dependence     Insomnia due to psychological stress     Arthritis     Chronic pain     Prostate canc Attends meetings of clubs or organizations: Not on file        Relationship status: Not on file      Intimate partner violence:        Fear of current or ex partner: Not on file        Emotionally abused: Not on file        Physically abused: Not on file 120 No   Left Extension 0 No     MOTOR EXAMINATION:  UPPER EXTREMITY      LEFT RIGHT   Deltoid 5/5 5/5   Biceps 5/5 5/5   Triceps 5/5 5/5   Brachioradialis 5/5 5/5   Wrist Flexors 5/5 5/5   Wrist Extensors 5/5 5/5   Intrinsic Hand 5/5 5/5    5/5 5/5

## 2019-09-13 ENCOUNTER — TELEPHONE (OUTPATIENT)
Dept: FAMILY MEDICINE CLINIC | Facility: CLINIC | Age: 59
End: 2019-09-13

## 2019-09-13 RX ORDER — EPINEPHRINE 0.3 MG/.3ML
0.3 INJECTION SUBCUTANEOUS ONCE
Qty: 1 EACH | Refills: 0 | Status: SHIPPED | OUTPATIENT
Start: 2019-09-13 | End: 2019-09-13

## 2019-09-13 NOTE — TELEPHONE ENCOUNTER
Pt's wife would like an to refill Rx epinephrine, they had it for 2 years and its cloudy. They also used it yesterday after pt was stung by a bee.  Please advise

## 2019-09-30 ENCOUNTER — TELEPHONE (OUTPATIENT)
Dept: GASTROENTEROLOGY | Facility: CLINIC | Age: 59
End: 2019-09-30

## 2019-09-30 NOTE — TELEPHONE ENCOUNTER
----- Message from Barry Landis RN sent at 11/23/2016  6:01 PM CST -----  Regarding: colonoscopy recall  Colonoscopy recall due 3 yrs, 11/22/2019

## 2019-11-20 ENCOUNTER — TELEPHONE (OUTPATIENT)
Dept: GASTROENTEROLOGY | Facility: CLINIC | Age: 59
End: 2019-11-20

## 2019-11-20 DIAGNOSIS — Z12.11 COLON CANCER SCREENING: Primary | ICD-10-CM

## 2019-11-20 DIAGNOSIS — Z86.010 HISTORY OF COLON POLYPS: ICD-10-CM

## 2019-11-20 RX ORDER — EPINEPHRINE 0.3 MG/.3ML
INJECTION SUBCUTANEOUS
Refills: 0 | COMMUNITY
Start: 2019-09-16

## 2019-11-20 NOTE — TELEPHONE ENCOUNTER
Last Procedure, Date, MD:  11/22/2016 Dr. Jayesh Bonds  Last Diagnosis:  Tubular adenoma, internal hemorrhoids, diverticular disease  Recalled for (months or years): 3 years   Sedation used previously:  MAC  Last Prep Used (if known):  Miralax/Gatorade  Quality o

## 2019-11-20 NOTE — TELEPHONE ENCOUNTER
Pts wife Sherryle Masker called to schedule 3 year repeat colonoscopy per letter received in mail. No recall appts available. Please call.

## 2019-11-21 NOTE — TELEPHONE ENCOUNTER
The patient's chart has been reviewed. Okay to schedule pt for 3 year colonoscopy recall r/t screening for colon cancer, history of colon polyps with Dr. Jayesh Bonds.      Advise MAC sedation per previous procedure with split dose Colyte or equivalent (eRx) prepa

## 2019-11-22 ENCOUNTER — TELEPHONE (OUTPATIENT)
Dept: FAMILY MEDICINE CLINIC | Facility: CLINIC | Age: 59
End: 2019-11-22

## 2019-11-22 NOTE — TELEPHONE ENCOUNTER
Scheduled for:  Colonoscopy 20322  Provider Name: Dr. Jayesh Bonds  Date:  2/10/20  Location:  [facility]  Sedation:  MAC  Time:   0830 (pt is aware to arrive at 0730)   Prep:  Colyte, mailed 11/25/19  Meds/Allergies Reconciled?:  Physician reviewed   Diagnosis with cod

## 2020-02-10 ENCOUNTER — ANESTHESIA (OUTPATIENT)
Dept: ENDOSCOPY | Facility: HOSPITAL | Age: 60
End: 2020-02-10
Payer: MEDICARE

## 2020-02-10 ENCOUNTER — HOSPITAL ENCOUNTER (OUTPATIENT)
Facility: HOSPITAL | Age: 60
Setting detail: HOSPITAL OUTPATIENT SURGERY
Discharge: HOME | End: 2020-02-10
Attending: INTERNAL MEDICINE | Admitting: INTERNAL MEDICINE
Payer: MEDICARE

## 2020-02-10 ENCOUNTER — ANESTHESIA EVENT (OUTPATIENT)
Dept: ENDOSCOPY | Facility: HOSPITAL | Age: 60
End: 2020-02-10
Payer: MEDICARE

## 2020-02-10 DIAGNOSIS — Z12.11 COLON CANCER SCREENING: ICD-10-CM

## 2020-02-10 DIAGNOSIS — Z86.010 HISTORY OF COLON POLYPS: ICD-10-CM

## 2020-02-10 PROCEDURE — G0105 COLORECTAL SCRN; HI RISK IND: HCPCS | Performed by: INTERNAL MEDICINE

## 2020-02-10 PROCEDURE — 0DJD8ZZ INSPECTION OF LOWER INTESTINAL TRACT, VIA NATURAL OR ARTIFICIAL OPENING ENDOSCOPIC: ICD-10-PCS | Performed by: INTERNAL MEDICINE

## 2020-02-10 RX ORDER — SODIUM CHLORIDE, SODIUM LACTATE, POTASSIUM CHLORIDE, CALCIUM CHLORIDE 600; 310; 30; 20 MG/100ML; MG/100ML; MG/100ML; MG/100ML
INJECTION, SOLUTION INTRAVENOUS CONTINUOUS
Status: DISCONTINUED | OUTPATIENT
Start: 2020-02-10 | End: 2020-02-10

## 2020-02-10 RX ORDER — NALOXONE HYDROCHLORIDE 0.4 MG/ML
80 INJECTION, SOLUTION INTRAMUSCULAR; INTRAVENOUS; SUBCUTANEOUS AS NEEDED
Status: DISCONTINUED | OUTPATIENT
Start: 2020-02-10 | End: 2020-02-10

## 2020-02-10 RX ADMIN — SODIUM CHLORIDE, SODIUM LACTATE, POTASSIUM CHLORIDE, CALCIUM CHLORIDE: 600; 310; 30; 20 INJECTION, SOLUTION INTRAVENOUS at 09:18:00

## 2020-02-10 NOTE — H&P
History & Physical Examination    Patient Name: Cosme Trujillo  MRN: [id number]  [facility]: [id number]  YOB: 1960    Diagnosis: colon polyp history      HYDROcodone-acetaminophen  MG Oral Tab, Take 1 tablet by mouth every 8 (eight) hours as Physical done within the last 30 days. Any changes noted above. Haroon Henriquez.  Les  2/10/2020  8:50 AM

## 2020-02-10 NOTE — ANESTHESIA PREPROCEDURE EVALUATION
Anesthesia PreOp Note    HPI:     Olena Engle is a 62year old male who presents for preoperative consultation requested by: Joselyn Johnson MD    Date of Surgery: 2/10/2020    Procedure(s):  COLONOSCOPY  Indication: Colon cancer screening, History o Intravenous, Continuous, Perfecto Rivas Anayeli Irwin MD, Last Rate: 20 mL/hr at 02/10/20 6581    No current [facility]-ordered outpatient medications on file.         Other                   ANAPHYLAXIS    Comment:Bee Sting    Family History   Problem Relation Age of Onset on file    Other Topics      Concerns:         Service: Not Asked        Blood Transfusions: Not Asked        Caffeine Concern: Yes          Coffee, 4 cups daily.         Occupational Exposure: Not Asked        Hobby Hazards: Not Asked        Sleep Patient  Discussed plan with:  CRNA and surgeon      I have informed Gloryjenna Turkjuan pablo and/or legal guardian or family member of the nature of the anesthetic plan, benefits, risks including possible dental damage if relevant, major complications, and any alt

## 2020-02-11 VITALS
RESPIRATION RATE: 20 BRPM | HEIGHT: 66 IN | SYSTOLIC BLOOD PRESSURE: 119 MMHG | HEART RATE: 73 BPM | DIASTOLIC BLOOD PRESSURE: 79 MMHG | WEIGHT: 190 LBS | OXYGEN SATURATION: 97 % | BODY MASS INDEX: 30.53 KG/M2

## 2020-02-20 ENCOUNTER — HOSPITAL ENCOUNTER (OUTPATIENT)
Dept: GENERAL RADIOLOGY | Age: 60
Discharge: HOME | End: 2020-02-20
Attending: FAMILY MEDICINE
Payer: MEDICARE

## 2020-02-20 ENCOUNTER — OFFICE VISIT (OUTPATIENT)
Dept: FAMILY MEDICINE CLINIC | Facility: CLINIC | Age: 60
End: 2020-02-20
Payer: COMMERCIAL

## 2020-02-20 ENCOUNTER — LAB ENCOUNTER (OUTPATIENT)
Dept: LAB | Age: 60
End: 2020-02-20
Attending: FAMILY MEDICINE
Payer: MEDICARE

## 2020-02-20 VITALS
HEART RATE: 80 BPM | BODY MASS INDEX: 33.75 KG/M2 | SYSTOLIC BLOOD PRESSURE: 131 MMHG | DIASTOLIC BLOOD PRESSURE: 90 MMHG | HEIGHT: 66 IN | WEIGHT: 210 LBS

## 2020-02-20 DIAGNOSIS — M79.641 PAIN IN BOTH HANDS: ICD-10-CM

## 2020-02-20 DIAGNOSIS — M79.642 PAIN IN BOTH HANDS: ICD-10-CM

## 2020-02-20 DIAGNOSIS — Z12.5 PROSTATE CANCER SCREENING: ICD-10-CM

## 2020-02-20 DIAGNOSIS — E78.00 PURE HYPERCHOLESTEROLEMIA: ICD-10-CM

## 2020-02-20 DIAGNOSIS — E78.00 PURE HYPERCHOLESTEROLEMIA: Primary | ICD-10-CM

## 2020-02-20 LAB
ALBUMIN SERPL-MCNC: 4 G/DL (ref 3.4–5)
ALBUMIN/GLOB SERPL: 1.4 {RATIO} (ref 1–2)
ALP LIVER SERPL-CCNC: 73 U/L (ref 45–117)
ALT SERPL-CCNC: 23 U/L (ref 16–61)
ANION GAP SERPL CALC-SCNC: 7 MMOL/L (ref 0–18)
AST SERPL-CCNC: 13 U/L (ref 15–37)
BASOPHILS # BLD AUTO: 0.04 X10(3) UL (ref 0–0.2)
BASOPHILS NFR BLD AUTO: 0.6 %
BILIRUB SERPL-MCNC: 0.3 MG/DL (ref 0.1–2)
BUN BLD-MCNC: 20 MG/DL (ref 7–18)
BUN/CREAT SERPL: 23 (ref 10–20)
CALCIUM BLD-MCNC: 9.1 MG/DL (ref 8.5–10.1)
CHLORIDE SERPL-SCNC: 108 MMOL/L (ref 98–112)
CHOLEST SMN-MCNC: 210 MG/DL (ref ?–200)
CO2 SERPL-SCNC: 25 MMOL/L (ref 21–32)
COMPLEXED PSA SERPL-MCNC: 2.44 NG/ML (ref ?–4)
CREAT BLD-MCNC: 0.87 MG/DL (ref 0.7–1.3)
DEPRECATED RDW RBC AUTO: 39.2 FL (ref 35.1–46.3)
EOSINOPHIL # BLD AUTO: 0.1 X10(3) UL (ref 0–0.7)
EOSINOPHIL NFR BLD AUTO: 1.6 %
ERYTHROCYTE [DISTWIDTH] IN BLOOD BY AUTOMATED COUNT: 13.1 % (ref 11–15)
GLOBULIN PLAS-MCNC: 2.9 G/DL (ref 2.8–4.4)
GLUCOSE BLD-MCNC: 94 MG/DL (ref 70–99)
HCT VFR BLD AUTO: 43.2 % (ref 39–53)
HDLC SERPL-MCNC: 59 MG/DL (ref 40–59)
HGB BLD-MCNC: 14 G/DL (ref 13–17.5)
IMM GRANULOCYTES # BLD AUTO: 0.02 X10(3) UL (ref 0–1)
IMM GRANULOCYTES NFR BLD: 0.3 %
LDLC SERPL CALC-MCNC: 143 MG/DL (ref ?–100)
LYMPHOCYTES # BLD AUTO: 1.43 X10(3) UL (ref 1–4)
LYMPHOCYTES NFR BLD AUTO: 22.6 %
M PROTEIN MFR SERPL ELPH: 6.9 G/DL (ref 6.4–8.2)
MCH RBC QN AUTO: 26.7 PG (ref 26–34)
MCHC RBC AUTO-ENTMCNC: 32.4 G/DL (ref 31–37)
MCV RBC AUTO: 82.4 FL (ref 80–100)
MONOCYTES # BLD AUTO: 0.53 X10(3) UL (ref 0.1–1)
MONOCYTES NFR BLD AUTO: 8.4 %
NEUTROPHILS # BLD AUTO: 4.2 X10 (3) UL (ref 1.5–7.7)
NEUTROPHILS # BLD AUTO: 4.2 X10(3) UL (ref 1.5–7.7)
NEUTROPHILS NFR BLD AUTO: 66.5 %
NONHDLC SERPL-MCNC: 151 MG/DL (ref ?–130)
OSMOLALITY SERPL CALC.SUM OF ELEC: 292 MOSM/KG (ref 275–295)
PATIENT FASTING Y/N/NP: YES
PATIENT FASTING Y/N/NP: YES
PLATELET # BLD AUTO: 203 10(3)UL (ref 150–450)
POTASSIUM SERPL-SCNC: 4.9 MMOL/L (ref 3.5–5.1)
RBC # BLD AUTO: 5.24 X10(6)UL (ref 4.3–5.7)
RHEUMATOID FACT SERPL-ACNC: <10 IU/ML (ref ?–15)
SODIUM SERPL-SCNC: 140 MMOL/L (ref 136–145)
TRIGL SERPL-MCNC: 40 MG/DL (ref 30–149)
TSI SER-ACNC: 2.65 MIU/ML (ref 0.36–3.74)
VLDLC SERPL CALC-MCNC: 8 MG/DL (ref 0–30)
WBC # BLD AUTO: 6.3 X10(3) UL (ref 4–11)

## 2020-02-20 PROCEDURE — 85025 COMPLETE CBC W/AUTO DIFF WBC: CPT

## 2020-02-20 PROCEDURE — 86431 RHEUMATOID FACTOR QUANT: CPT

## 2020-02-20 PROCEDURE — 80053 COMPREHEN METABOLIC PANEL: CPT

## 2020-02-20 PROCEDURE — 73130 X-RAY EXAM OF HAND: CPT | Performed by: FAMILY MEDICINE

## 2020-02-20 PROCEDURE — 84443 ASSAY THYROID STIM HORMONE: CPT

## 2020-02-20 PROCEDURE — 86200 CCP ANTIBODY: CPT

## 2020-02-20 PROCEDURE — 80061 LIPID PANEL: CPT

## 2020-02-20 PROCEDURE — 36415 COLL VENOUS BLD VENIPUNCTURE: CPT

## 2020-02-20 PROCEDURE — 86038 ANTINUCLEAR ANTIBODIES: CPT

## 2020-02-20 PROCEDURE — 99214 OFFICE O/P EST MOD 30 MIN: CPT | Performed by: FAMILY MEDICINE

## 2020-02-20 RX ORDER — MELOXICAM 15 MG/1
15 TABLET ORAL DAILY
Qty: 30 TABLET | Refills: 1 | Status: SHIPPED | OUTPATIENT
Start: 2020-02-20 | End: 2020-02-20

## 2020-02-20 NOTE — PROGRESS NOTES
Blood pressure 131/90, pulse 80, height 5' 6\" (1.676 m), weight 210 lb (95.3 kg). Denies any family history of rheumatism. Denies rashes. Complaining of significant bilateral hand pain for the past month. Pain keeps him awake at night.   Morning stif

## 2020-02-20 NOTE — TELEPHONE ENCOUNTER
Pharmacy comments:  Patient is requesting authorization to dispense a 90 day supply for. .    Current Outpatient Medications:   •  Meloxicam 15 MG Oral Tab, Take 1 tablet (15 mg total) by mouth daily. , Disp: 30 tablet, Rfl: 10

## 2020-02-21 ENCOUNTER — TELEPHONE (OUTPATIENT)
Dept: FAMILY MEDICINE CLINIC | Facility: CLINIC | Age: 60
End: 2020-02-21

## 2020-02-21 DIAGNOSIS — M19.90 ARTHRITIS: ICD-10-CM

## 2020-02-21 DIAGNOSIS — D46.4 RA (REFRACTORY ANEMIA) (HCC): Primary | ICD-10-CM

## 2020-02-21 LAB — CCP IGG SERPL-ACNC: <0.4 U/ML (ref 0–6.9)

## 2020-02-21 RX ORDER — MELOXICAM 15 MG/1
15 TABLET ORAL DAILY
Qty: 90 TABLET | Refills: 1 | Status: SHIPPED | OUTPATIENT
Start: 2020-02-21 | End: 2020-08-19

## 2020-02-21 NOTE — TELEPHONE ENCOUNTER
Per note request 90 days supply,pls advise, thanks in advance.        Refill Protocol Appointment Criteria  · Appointment scheduled in the past 12 months or in the next 3 months  Recent Outpatient Visits            Today Pure hypercholesterolemia    [place]

## 2020-02-21 NOTE — TELEPHONE ENCOUNTER
Patient's wife called requesting a referral to rheumatology for RA.      Please sign off on referral request.     Thank you, Gustavo

## 2020-02-24 LAB — NUCLEAR IGG TITR SER IF: NEGATIVE {TITER}

## 2020-03-16 ENCOUNTER — APPOINTMENT (OUTPATIENT)
Dept: LAB | Age: 60
End: 2020-03-16
Attending: INTERNAL MEDICINE
Payer: MEDICARE

## 2020-03-16 ENCOUNTER — OFFICE VISIT (OUTPATIENT)
Dept: RHEUMATOLOGY | Facility: CLINIC | Age: 60
End: 2020-03-16
Payer: MEDICARE

## 2020-03-16 VITALS
HEIGHT: 66 IN | BODY MASS INDEX: 32.62 KG/M2 | HEART RATE: 84 BPM | WEIGHT: 203 LBS | SYSTOLIC BLOOD PRESSURE: 118 MMHG | DIASTOLIC BLOOD PRESSURE: 76 MMHG

## 2020-03-16 DIAGNOSIS — M19.90 INFLAMMATORY ARTHRITIS: ICD-10-CM

## 2020-03-16 DIAGNOSIS — G89.29 CHRONIC PAIN OF BOTH SHOULDERS: ICD-10-CM

## 2020-03-16 DIAGNOSIS — M19.90 INFLAMMATORY ARTHRITIS: Primary | ICD-10-CM

## 2020-03-16 DIAGNOSIS — M25.511 CHRONIC PAIN OF BOTH SHOULDERS: ICD-10-CM

## 2020-03-16 DIAGNOSIS — M25.512 CHRONIC PAIN OF BOTH SHOULDERS: ICD-10-CM

## 2020-03-16 LAB
CRP SERPL-MCNC: 0.78 MG/DL (ref ?–0.3)
ERYTHROCYTE [SEDIMENTATION RATE] IN BLOOD: 9 MM/HR (ref 0–20)
HBV CORE AB SERPL QL IA: NONREACTIVE
HBV SURFACE AB SER QL: NONREACTIVE
HBV SURFACE AB SERPL IA-ACNC: <3.1 MIU/ML
HBV SURFACE AG SER-ACNC: <0.1 [IU]/L
HBV SURFACE AG SERPL QL IA: NONREACTIVE
HCV AB SERPL QL IA: NONREACTIVE

## 2020-03-16 PROCEDURE — 82955 ASSAY OF G6PD ENZYME: CPT | Performed by: INTERNAL MEDICINE

## 2020-03-16 PROCEDURE — 36415 COLL VENOUS BLD VENIPUNCTURE: CPT | Performed by: INTERNAL MEDICINE

## 2020-03-16 PROCEDURE — 86480 TB TEST CELL IMMUN MEASURE: CPT

## 2020-03-16 PROCEDURE — 86704 HEP B CORE ANTIBODY TOTAL: CPT | Performed by: INTERNAL MEDICINE

## 2020-03-16 PROCEDURE — 86140 C-REACTIVE PROTEIN: CPT | Performed by: INTERNAL MEDICINE

## 2020-03-16 PROCEDURE — 87340 HEPATITIS B SURFACE AG IA: CPT | Performed by: INTERNAL MEDICINE

## 2020-03-16 PROCEDURE — 86803 HEPATITIS C AB TEST: CPT | Performed by: INTERNAL MEDICINE

## 2020-03-16 PROCEDURE — 99204 OFFICE O/P NEW MOD 45 MIN: CPT | Performed by: INTERNAL MEDICINE

## 2020-03-16 PROCEDURE — 85652 RBC SED RATE AUTOMATED: CPT | Performed by: INTERNAL MEDICINE

## 2020-03-16 PROCEDURE — 86706 HEP B SURFACE ANTIBODY: CPT | Performed by: INTERNAL MEDICINE

## 2020-03-16 RX ORDER — PREDNISONE 10 MG/1
20 TABLET ORAL DAILY
Qty: 60 TABLET | Refills: 0 | Status: SHIPPED | OUTPATIENT
Start: 2020-03-16 | End: 2021-09-08

## 2020-03-16 NOTE — PATIENT INSTRUCTIONS
You were seen today for joint pain  Lets get some blood work  Get US of the R hand  Will prescribe prednisone 20 mg daily, try taking it after the US of the hand  Follow up in 2 week  Stop the norco, advil and meloxicam

## 2020-03-16 NOTE — PROGRESS NOTES
Gulshan Addison is a 62year old male who presents for Patient presents with:  Consult: Per PCP for evaluation for arthritis  Hand Pain: Bilateral hand Xrays completed 2/20/2020. Stiffness, and pain. Has trouble sleeping  .    HPI:   CC: hand pain  Consult: Meloxicam 15 MG Oral Tab Take 1 tablet (15 mg total) by mouth daily.  90 tablet 1   • PEG 3350-KCl-NaBcb-NaCl-NaSulf (COLYTE WITH FLAVOR PACKS) 240 g Oral Recon Soln As directed per GI consult notes (Patient not taking: Reported on 2/20/2020 ) 1 Bottle 0 melena  : no dysuria, no hx of miscarriages, no DVT Hx, no hx of OCP,   Neuro: + numbness or tingling, no headache, no hx of seizures,   Psych: no hx of anxiety or depression  ENDO: no hx of thyroid disease, no hx of DM  Joint/Muscluskeltal: see HPI,   A 1739 (H)   BODY FLUID RBC      <1 /CUMM 628 (H)   BODY FLUID NEUTROPHILS      % 73   BODY FLUID LYMPHOCYTES      % 9   FLUID MONOCYTES      % 18   BODY FLUID EOSINOPHILS      % 0   BODY FLUID BASOPHILS      % 0   BODY FLUID OTHER CELLS      % 0   FLUID CRY

## 2020-03-17 ENCOUNTER — HOSPITAL ENCOUNTER (OUTPATIENT)
Dept: ULTRASOUND IMAGING | Facility: HOSPITAL | Age: 60
Discharge: HOME | End: 2020-03-17
Attending: INTERNAL MEDICINE
Payer: MEDICARE

## 2020-03-17 DIAGNOSIS — M19.90 INFLAMMATORY ARTHRITIS: ICD-10-CM

## 2020-03-17 PROCEDURE — 76881 US COMPL JOINT R-T W/IMG: CPT | Performed by: INTERNAL MEDICINE

## 2020-03-18 ENCOUNTER — TELEPHONE (OUTPATIENT)
Dept: RHEUMATOLOGY | Facility: CLINIC | Age: 60
End: 2020-03-18

## 2020-03-18 DIAGNOSIS — R76.12 POSITIVE QUANTIFERON-TB GOLD TEST: Primary | ICD-10-CM

## 2020-03-18 LAB
GLUCOSE-6-PHOSPHATE DEHYDROGENASE: 13.1 U/G HB
M TB IFN-G CD4+ T-CELLS BLD-ACNC: 0.05 IU/ML
M TB TUBERC IFN-G BLD QL: POSITIVE
M TB TUBERC IGNF/MITOGEN IGNF CONTROL: 7.81 IU/ML
QUANTIFERON TB1 MINUS NIL: 1.5 IU/ML
QUANTIFERON TB2 MINUS NIL: 1.24 IU/ML

## 2020-03-18 NOTE — TELEPHONE ENCOUNTER
[facility] lab called to inform of positive QTB lab. CXR and consult with [facility]? Please advise.      Component      Latest Ref Rng & Units 3/16/2020   Quantiferon TB NIL      IU/mL 0.05   Quantiferon-TB1 Minus NIL      IU/mL 1.50   Quantiferon-TB2 Minus NIL

## 2020-03-18 NOTE — TELEPHONE ENCOUNTER
Tried calling patient and left a voicemail. I did give him a referral to infectious disease and a phone number to call.   A chest x-ray was also ordered

## 2020-04-12 ENCOUNTER — PATIENT MESSAGE (OUTPATIENT)
Dept: RHEUMATOLOGY | Facility: CLINIC | Age: 60
End: 2020-04-12

## 2020-04-13 RX ORDER — PREDNISONE 1 MG/1
7.5 TABLET ORAL DAILY
Qty: 45 TABLET | Refills: 1 | Status: SHIPPED | OUTPATIENT
Start: 2020-04-13 | End: 2020-06-01

## 2020-04-13 NOTE — TELEPHONE ENCOUNTER
Yobani Hensley    I would recommend to go down to prednisone 7.5 mg daily. Stay on this dose.   I would like to add another medication for your rheumatoid arthritis but you do need to see infectious disease first and get the chest x-ray done and will need to

## 2020-04-13 NOTE — TELEPHONE ENCOUNTER
From: June Barrera  To: Ferny Ibanez MD  Sent: 4/12/2020 10:25 AM CDT  Subject: Visit Follow-up Question    Dear Dr. Kelton Hawkins,   I wanted to follow up after our phone call and ask a question. First, I scheduled a chest x-ray on April 23 as you suggested.  Ne

## 2020-04-23 ENCOUNTER — HOSPITAL ENCOUNTER (OUTPATIENT)
Dept: GENERAL RADIOLOGY | Age: 60
Discharge: HOME | End: 2020-04-23
Attending: INTERNAL MEDICINE
Payer: MEDICARE

## 2020-04-23 DIAGNOSIS — R76.12 POSITIVE QUANTIFERON-TB GOLD TEST: ICD-10-CM

## 2020-04-23 PROCEDURE — 71046 X-RAY EXAM CHEST 2 VIEWS: CPT | Performed by: INTERNAL MEDICINE

## 2020-06-01 ENCOUNTER — LAB ENCOUNTER (OUTPATIENT)
Dept: LAB | Age: 60
End: 2020-06-01
Attending: INTERNAL MEDICINE
Payer: MEDICARE

## 2020-06-01 ENCOUNTER — VIRTUAL PHONE E/M (OUTPATIENT)
Dept: RHEUMATOLOGY | Facility: CLINIC | Age: 60
End: 2020-06-01
Payer: MEDICARE

## 2020-06-01 DIAGNOSIS — R76.12 POSITIVE QUANTIFERON-TB GOLD TEST: ICD-10-CM

## 2020-06-01 DIAGNOSIS — M19.90 INFLAMMATORY ARTHRITIS: Primary | ICD-10-CM

## 2020-06-01 DIAGNOSIS — R76.12 POSITIVE QUANTIFERON-TB GOLD TEST: Primary | ICD-10-CM

## 2020-06-01 DIAGNOSIS — M35.3 PMR (POLYMYALGIA RHEUMATICA) (HCC): ICD-10-CM

## 2020-06-01 PROCEDURE — 85025 COMPLETE CBC W/AUTO DIFF WBC: CPT

## 2020-06-01 PROCEDURE — 99442 PHONE E/M BY PHYS 11-20 MIN: CPT | Performed by: INTERNAL MEDICINE

## 2020-06-01 PROCEDURE — 80053 COMPREHEN METABOLIC PANEL: CPT

## 2020-06-01 PROCEDURE — 36415 COLL VENOUS BLD VENIPUNCTURE: CPT

## 2020-06-01 RX ORDER — PREDNISONE 1 MG/1
5 TABLET ORAL DAILY
Qty: 60 TABLET | Refills: 0 | Status: SHIPPED | OUTPATIENT
Start: 2020-06-01 | End: 2020-07-26

## 2020-06-01 NOTE — PATIENT INSTRUCTIONS
We discussed your joint pain symptoms. Again your symptoms could be from PMR versus rheumatoid arthritis  Plan to decrease your prednisone to 5 mg daily.   Refills were sent to your pharmacy  If you develop worsening joint pain or swelling please let me kn

## 2020-06-01 NOTE — PROGRESS NOTES
Dmitri Dye is a 62year old male. HPI:   No chief complaint on file. Virtual Telephone Check-In    Dmitri Dye verbally consents to a Virtual/Telephone Check-In visit on 06/01/20.   Patient has been referred to the [facility] website at [address] currently is on prednisone 7.5 mg daily and reports about 70% improvement in his joint pain. Most of his pain is in his shoulders and his hands.   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   ASSESSMENT/PLAN:     Polyarthralgias likely Inflammatory arthritis vs PMR  - He was diagnosed with seronegative RA back in 2011 when he was found to have synovial cell count of 1700 and elevated inflammatory markers  - H

## 2020-06-16 ENCOUNTER — PATIENT MESSAGE (OUTPATIENT)
Dept: RHEUMATOLOGY | Facility: CLINIC | Age: 60
End: 2020-06-16

## 2020-06-16 NOTE — TELEPHONE ENCOUNTER
From: Ariel Durand  To: Tammy Lau MD  Sent: 6/16/2020 9:27 AM CDT  Subject: Visit Follow-up Question    Last time we spoke, you told me to email you if my condition changed and it has changed drastically.  I was taking 7.5MG of prednisone and have daniel

## 2020-06-17 RX ORDER — LEFLUNOMIDE 20 MG/1
20 TABLET ORAL DAILY
Qty: 90 TABLET | Refills: 0 | Status: SHIPPED | OUTPATIENT
Start: 2020-06-17 | End: 2020-09-10

## 2020-06-17 NOTE — TELEPHONE ENCOUNTER
Discussed symptoms with patient. He went down from 7.5 to 5 mg of prednisone and reports worsening joint pain in his hands, shoulders and neck. Plan to increase prednisone back to 7.5 mg and start leflunomide 20 mg daily.   [facility]

## 2020-07-27 ENCOUNTER — TELEPHONE (OUTPATIENT)
Dept: RHEUMATOLOGY | Facility: CLINIC | Age: 60
End: 2020-07-27

## 2020-07-27 DIAGNOSIS — M35.3 PMR (POLYMYALGIA RHEUMATICA) (HCC): ICD-10-CM

## 2020-07-27 DIAGNOSIS — Z51.81 ENCOUNTER FOR THERAPEUTIC DRUG MONITORING: ICD-10-CM

## 2020-07-27 DIAGNOSIS — M19.90 INFLAMMATORY ARTHRITIS: Primary | ICD-10-CM

## 2020-07-27 RX ORDER — PREDNISONE 1 MG/1
5 TABLET ORAL DAILY
Qty: 60 TABLET | Refills: 0 | Status: SHIPPED | OUTPATIENT
Start: 2020-07-27 | End: 2020-09-10

## 2020-07-27 NOTE — TELEPHONE ENCOUNTER
Patient commented on scheduled appointment to request follow up lab work prior to appointment. Please advise.

## 2020-07-27 NOTE — TELEPHONE ENCOUNTER
LOV: 6/1/20  Last Refilled:#60, 0rfs  6/1/20    Future Appointments   Date Time Provider Regan Angeles   8/19/2020  1:30 PM Shilpa Ortiz MD 2014 [facility]       Please advise.

## 2020-08-14 ENCOUNTER — LAB ENCOUNTER (OUTPATIENT)
Dept: LAB | Age: 60
End: 2020-08-14
Attending: INTERNAL MEDICINE
Payer: MEDICARE

## 2020-08-14 DIAGNOSIS — M35.3 PMR (POLYMYALGIA RHEUMATICA) (HCC): ICD-10-CM

## 2020-08-14 DIAGNOSIS — Z51.81 ENCOUNTER FOR THERAPEUTIC DRUG MONITORING: ICD-10-CM

## 2020-08-14 DIAGNOSIS — M19.90 INFLAMMATORY ARTHRITIS: ICD-10-CM

## 2020-08-14 LAB
ALBUMIN SERPL-MCNC: 3.8 G/DL (ref 3.4–5)
ALT SERPL-CCNC: 40 U/L (ref 16–61)
AST SERPL-CCNC: 19 U/L (ref 15–37)
BASOPHILS # BLD AUTO: 0.03 X10(3) UL (ref 0–0.2)
BASOPHILS NFR BLD AUTO: 0.5 %
CREAT BLD-MCNC: 0.75 MG/DL (ref 0.7–1.3)
CRP SERPL-MCNC: <0.29 MG/DL (ref ?–0.3)
DEPRECATED RDW RBC AUTO: 40.3 FL (ref 35.1–46.3)
EOSINOPHIL # BLD AUTO: 0.02 X10(3) UL (ref 0–0.7)
EOSINOPHIL NFR BLD AUTO: 0.4 %
ERYTHROCYTE [DISTWIDTH] IN BLOOD BY AUTOMATED COUNT: 13.3 % (ref 11–15)
ERYTHROCYTE [SEDIMENTATION RATE] IN BLOOD: 6 MM/HR (ref 0–20)
HCT VFR BLD AUTO: 41.8 % (ref 39–53)
HGB BLD-MCNC: 13.4 G/DL (ref 13–17.5)
IMM GRANULOCYTES # BLD AUTO: 0.01 X10(3) UL (ref 0–1)
IMM GRANULOCYTES NFR BLD: 0.2 %
LYMPHOCYTES # BLD AUTO: 1.14 X10(3) UL (ref 1–4)
LYMPHOCYTES NFR BLD AUTO: 20.1 %
MCH RBC QN AUTO: 26.8 PG (ref 26–34)
MCHC RBC AUTO-ENTMCNC: 32.1 G/DL (ref 31–37)
MCV RBC AUTO: 83.6 FL (ref 80–100)
MONOCYTES # BLD AUTO: 0.32 X10(3) UL (ref 0.1–1)
MONOCYTES NFR BLD AUTO: 5.6 %
NEUTROPHILS # BLD AUTO: 4.16 X10 (3) UL (ref 1.5–7.7)
NEUTROPHILS # BLD AUTO: 4.16 X10(3) UL (ref 1.5–7.7)
NEUTROPHILS NFR BLD AUTO: 73.2 %
PLATELET # BLD AUTO: 226 10(3)UL (ref 150–450)
RBC # BLD AUTO: 5 X10(6)UL (ref 4.3–5.7)
WBC # BLD AUTO: 5.7 X10(3) UL (ref 4–11)

## 2020-08-14 PROCEDURE — 86140 C-REACTIVE PROTEIN: CPT

## 2020-08-14 PROCEDURE — 84460 ALANINE AMINO (ALT) (SGPT): CPT

## 2020-08-14 PROCEDURE — 85652 RBC SED RATE AUTOMATED: CPT

## 2020-08-14 PROCEDURE — 36415 COLL VENOUS BLD VENIPUNCTURE: CPT

## 2020-08-14 PROCEDURE — 85025 COMPLETE CBC W/AUTO DIFF WBC: CPT

## 2020-08-14 PROCEDURE — 84450 TRANSFERASE (AST) (SGOT): CPT

## 2020-08-14 PROCEDURE — 82565 ASSAY OF CREATININE: CPT

## 2020-08-14 PROCEDURE — 82040 ASSAY OF SERUM ALBUMIN: CPT

## 2020-08-19 ENCOUNTER — OFFICE VISIT (OUTPATIENT)
Dept: RHEUMATOLOGY | Facility: CLINIC | Age: 60
End: 2020-08-19
Payer: MEDICARE

## 2020-08-19 VITALS
HEART RATE: 78 BPM | SYSTOLIC BLOOD PRESSURE: 111 MMHG | HEIGHT: 66 IN | DIASTOLIC BLOOD PRESSURE: 72 MMHG | WEIGHT: 199 LBS | BODY MASS INDEX: 31.98 KG/M2

## 2020-08-19 DIAGNOSIS — Z51.81 ENCOUNTER FOR THERAPEUTIC DRUG MONITORING: ICD-10-CM

## 2020-08-19 DIAGNOSIS — R76.12 POSITIVE QUANTIFERON-TB GOLD TEST: ICD-10-CM

## 2020-08-19 DIAGNOSIS — M35.3 PMR (POLYMYALGIA RHEUMATICA) (HCC): ICD-10-CM

## 2020-08-19 DIAGNOSIS — M19.90 INFLAMMATORY ARTHRITIS: Primary | ICD-10-CM

## 2020-08-19 PROCEDURE — 3074F SYST BP LT 130 MM HG: CPT | Performed by: INTERNAL MEDICINE

## 2020-08-19 PROCEDURE — 3078F DIAST BP <80 MM HG: CPT | Performed by: INTERNAL MEDICINE

## 2020-08-19 PROCEDURE — 3008F BODY MASS INDEX DOCD: CPT | Performed by: INTERNAL MEDICINE

## 2020-08-19 PROCEDURE — 99214 OFFICE O/P EST MOD 30 MIN: CPT | Performed by: INTERNAL MEDICINE

## 2020-08-19 NOTE — PATIENT INSTRUCTIONS
You were seen today for PMR vs RA  Go down to prednisone 5 mg daily  Cont leflunomide 20 mg daily  Could consider starting sulfasalazine if you have symptoms come back again  Blood work in 3 mos

## 2020-09-10 ENCOUNTER — TELEPHONE (OUTPATIENT)
Dept: CASE MANAGEMENT | Age: 60
End: 2020-09-10

## 2020-09-10 NOTE — TELEPHONE ENCOUNTER
Patient is eligible for a 2020 Medicare Annual Wellness visit. Discussed in detail w/patient. Patient declined to schedule appt.

## 2020-09-11 RX ORDER — LEFLUNOMIDE 20 MG/1
20 TABLET ORAL DAILY
Qty: 90 TABLET | Refills: 0 | Status: SHIPPED | OUTPATIENT
Start: 2020-09-11 | End: 2020-12-10

## 2020-09-11 RX ORDER — PREDNISONE 1 MG/1
5 TABLET ORAL DAILY
Qty: 60 TABLET | Refills: 0 | Status: SHIPPED | OUTPATIENT
Start: 2020-09-11 | End: 2021-09-08

## 2020-09-11 NOTE — TELEPHONE ENCOUNTER
Prednisone  Last filled: 7/27/2020 #60 tab with 0 refills    Leflunomide  Last filled: 6/17/2020 #90 tab with 0 refills     LOV: 8/18/2020  No future appointments.   Labs: 8/14/20  ASSESSMENT/PLAN:      Polyarthralgias likely Inflammatory arthritis vs PMR

## 2020-10-30 ENCOUNTER — LAB ENCOUNTER (OUTPATIENT)
Dept: LAB | Age: 60
End: 2020-10-30
Attending: INTERNAL MEDICINE
Payer: MEDICARE

## 2020-10-30 ENCOUNTER — IMMUNIZATION (OUTPATIENT)
Dept: FAMILY MEDICINE CLINIC | Facility: CLINIC | Age: 60
End: 2020-10-30
Payer: MEDICARE

## 2020-10-30 DIAGNOSIS — Z23 NEED FOR VACCINATION: ICD-10-CM

## 2020-10-30 DIAGNOSIS — M35.3 PMR (POLYMYALGIA RHEUMATICA) (HCC): ICD-10-CM

## 2020-10-30 DIAGNOSIS — M19.90 INFLAMMATORY ARTHRITIS: ICD-10-CM

## 2020-10-30 DIAGNOSIS — Z51.81 ENCOUNTER FOR THERAPEUTIC DRUG MONITORING: ICD-10-CM

## 2020-10-30 PROCEDURE — 84460 ALANINE AMINO (ALT) (SGPT): CPT

## 2020-10-30 PROCEDURE — 36415 COLL VENOUS BLD VENIPUNCTURE: CPT

## 2020-10-30 PROCEDURE — 85652 RBC SED RATE AUTOMATED: CPT

## 2020-10-30 PROCEDURE — 86140 C-REACTIVE PROTEIN: CPT

## 2020-10-30 PROCEDURE — 84450 TRANSFERASE (AST) (SGOT): CPT

## 2020-10-30 PROCEDURE — 82565 ASSAY OF CREATININE: CPT

## 2020-10-30 PROCEDURE — 85027 COMPLETE CBC AUTOMATED: CPT

## 2020-10-30 PROCEDURE — G0008 ADMIN INFLUENZA VIRUS VAC: HCPCS | Performed by: FAMILY MEDICINE

## 2020-10-30 PROCEDURE — 82040 ASSAY OF SERUM ALBUMIN: CPT

## 2020-10-30 PROCEDURE — 85007 BL SMEAR W/DIFF WBC COUNT: CPT

## 2020-10-30 PROCEDURE — 85025 COMPLETE CBC W/AUTO DIFF WBC: CPT

## 2020-10-30 PROCEDURE — 90686 IIV4 VACC NO PRSV 0.5 ML IM: CPT | Performed by: FAMILY MEDICINE

## 2020-10-30 PROCEDURE — 85060 BLOOD SMEAR INTERPRETATION: CPT

## 2020-11-02 ENCOUNTER — OFFICE VISIT (OUTPATIENT)
Dept: RHEUMATOLOGY | Facility: CLINIC | Age: 60
End: 2020-11-02
Payer: MEDICARE

## 2020-11-02 VITALS
DIASTOLIC BLOOD PRESSURE: 88 MMHG | HEIGHT: 66 IN | BODY MASS INDEX: 33.59 KG/M2 | HEART RATE: 77 BPM | SYSTOLIC BLOOD PRESSURE: 150 MMHG | WEIGHT: 209 LBS

## 2020-11-02 DIAGNOSIS — M19.90 INFLAMMATORY ARTHRITIS: Primary | ICD-10-CM

## 2020-11-02 DIAGNOSIS — Z51.81 ENCOUNTER FOR THERAPEUTIC DRUG MONITORING: ICD-10-CM

## 2020-11-02 DIAGNOSIS — M35.3 PMR (POLYMYALGIA RHEUMATICA) (HCC): ICD-10-CM

## 2020-11-02 PROCEDURE — 99214 OFFICE O/P EST MOD 30 MIN: CPT | Performed by: INTERNAL MEDICINE

## 2020-11-02 PROCEDURE — 3079F DIAST BP 80-89 MM HG: CPT | Performed by: INTERNAL MEDICINE

## 2020-11-02 PROCEDURE — 3077F SYST BP >= 140 MM HG: CPT | Performed by: INTERNAL MEDICINE

## 2020-11-02 PROCEDURE — 3008F BODY MASS INDEX DOCD: CPT | Performed by: INTERNAL MEDICINE

## 2020-11-02 RX ORDER — PREDNISONE 2.5 MG
2.5 TABLET ORAL DAILY
Qty: 30 TABLET | Refills: 0 | Status: SHIPPED | OUTPATIENT
Start: 2020-11-02 | End: 2021-09-08

## 2020-11-02 NOTE — PROGRESS NOTES
Pao Baum is a 56year old male. HPI:   No chief complaint on file.         Presents for f/u of PMR vs Seronegative RA    Current Medications:  Prednisone 5 mg daily- started 3/2020  Leflunomide 20 mg daily- started June 2020  Blood work:  Neg SAPPHIRE, by ID and started rifampin and completed 4 mos of treatment  Denies any GCA symptoms    HISTORY:  Past Medical History:   Diagnosis Date   • Back problem    • Broken neck ([facility] [address]    • Rheumatoid arteritis ([facility] [address]       Social Hx Reviewed   Family Hx Reviewed the thumb and index finger MCP joints without hyperemia to suggest inflammatory arthritis. Findings are most likely degenerative in nature. Otherwise no synovitis is seen throughout the right hand.     ASSESSMENT/PLAN:     Polyarthralgias likely Inflammat

## 2020-11-02 NOTE — PATIENT INSTRUCTIONS
You are seen today for rheumatoid arthritis  Continue leflunomide 20 mg daily  Get blood work in 3 months, in February  Decrease prednisone to 2.5 mg daily for 2 weeks and then 2.5 mg every other day for 2 weeks and then stop  Let you know how you are daniel

## 2020-12-10 RX ORDER — LEFLUNOMIDE 20 MG/1
20 TABLET ORAL DAILY
Qty: 90 TABLET | Refills: 1 | Status: SHIPPED | OUTPATIENT
Start: 2020-12-10 | End: 2021-05-08

## 2020-12-10 NOTE — TELEPHONE ENCOUNTER
LOV: 11/2/20  No future appointments.   Labs:     Component      Latest Ref Rng & Units 10/30/2020   WBC      4.0 - 11.0 x10(3) uL 4.6   RBC      4.30 - 5.70 x10(6)uL 5.24   Hemoglobin      13.0 - 17.5 g/dL 13.9   Hematocrit      39.0 - 53.0 % 43.9   MCV

## 2020-12-10 NOTE — TELEPHONE ENCOUNTER
Carey from [place] [phone] stated pt needs a refill on      leflunomide 20 MG Oral Tab 90 tablet 0 9/11/2020    Sig:   Take 1 tablet (20 mg total) by mouth daily. Thank you.

## 2020-12-15 ENCOUNTER — TELEPHONE (OUTPATIENT)
Dept: FAMILY MEDICINE CLINIC | Facility: CLINIC | Age: 60
End: 2020-12-15

## 2021-02-04 ENCOUNTER — TELEPHONE (OUTPATIENT)
Dept: FAMILY MEDICINE CLINIC | Facility: CLINIC | Age: 61
End: 2021-02-04

## 2021-03-08 ENCOUNTER — TELEPHONE (OUTPATIENT)
Dept: FAMILY MEDICINE CLINIC | Facility: CLINIC | Age: 61
End: 2021-03-08

## 2021-04-09 ENCOUNTER — LAB ENCOUNTER (OUTPATIENT)
Dept: LAB | Age: 61
End: 2021-04-09
Attending: INTERNAL MEDICINE
Payer: MEDICARE

## 2021-04-09 DIAGNOSIS — M19.90 INFLAMMATORY ARTHRITIS: ICD-10-CM

## 2021-04-09 DIAGNOSIS — Z51.81 ENCOUNTER FOR THERAPEUTIC DRUG MONITORING: ICD-10-CM

## 2021-04-09 DIAGNOSIS — M35.3 PMR (POLYMYALGIA RHEUMATICA) (HCC): ICD-10-CM

## 2021-04-09 PROCEDURE — 82040 ASSAY OF SERUM ALBUMIN: CPT

## 2021-04-09 PROCEDURE — 36415 COLL VENOUS BLD VENIPUNCTURE: CPT

## 2021-04-09 PROCEDURE — 84450 TRANSFERASE (AST) (SGOT): CPT

## 2021-04-09 PROCEDURE — 85652 RBC SED RATE AUTOMATED: CPT

## 2021-04-09 PROCEDURE — 82565 ASSAY OF CREATININE: CPT

## 2021-04-09 PROCEDURE — 85025 COMPLETE CBC W/AUTO DIFF WBC: CPT

## 2021-04-09 PROCEDURE — 84460 ALANINE AMINO (ALT) (SGPT): CPT

## 2021-04-09 PROCEDURE — 86140 C-REACTIVE PROTEIN: CPT

## 2021-05-08 NOTE — TELEPHONE ENCOUNTER
LOV: 11/2/20  Last Refilled:#90, 1rf  12/10/20  Labs:[facility]  4/9/21    No future appointments. Please advise. Telephone Encounter by Ruby Gaviria AU.D at 01/26/17 04:56 PM     Author:  Ruby Gaviria AU.D Service:  (none) Author Type:  Audiologist     Filed:  01/26/17 04:57 PM Encounter Date:  1/26/2017 Status:  Signed     :  Ruby Gaviria AU.D (Audiologist)            Please contact the patient and let him know that unfortunately Dr. Chambers will be out of the office tomorrow as well.  As soon as she is back, she will contact the patient to work him in at a time that works best for him.[RL1.1M]      Electronically Signed by:    ANDERSON Yeh , 1/26/2017[RL1.2T]        Revision History        User Key Date/Time User Provider Type Action    > RL1.2 01/26/17 04:57 PM Ruby Gaviria AU.D Audiologist Sign     RL1.1 01/26/17 04:56 PM Ruby Gaviria AU.D Audiologist     M - Manual, T - Template

## 2021-05-10 ENCOUNTER — TELEPHONE (OUTPATIENT)
Dept: CASE MANAGEMENT | Age: 61
End: 2021-05-10

## 2021-05-10 RX ORDER — LEFLUNOMIDE 20 MG/1
20 TABLET ORAL DAILY
Qty: 90 TABLET | Refills: 1 | Status: SHIPPED | OUTPATIENT
Start: 2021-05-10 | End: 2021-09-10

## 2021-05-10 NOTE — TELEPHONE ENCOUNTER
Patient is eligible for a 2021 Medicare Annual Wellness visit. Left message to call back [phone].

## 2021-06-02 ENCOUNTER — TELEPHONE (OUTPATIENT)
Dept: FAMILY MEDICINE CLINIC | Facility: CLINIC | Age: 61
End: 2021-06-02

## 2021-06-18 ENCOUNTER — TELEPHONE (OUTPATIENT)
Dept: CASE MANAGEMENT | Age: 61
End: 2021-06-18

## 2021-06-18 DIAGNOSIS — G89.29 CHRONIC LOW BACK PAIN WITH SCIATICA, SCIATICA LATERALITY UNSPECIFIED, UNSPECIFIED BACK PAIN LATERALITY: Primary | ICD-10-CM

## 2021-06-18 DIAGNOSIS — M54.40 CHRONIC LOW BACK PAIN WITH SCIATICA, SCIATICA LATERALITY UNSPECIFIED, UNSPECIFIED BACK PAIN LATERALITY: Primary | ICD-10-CM

## 2021-06-18 NOTE — TELEPHONE ENCOUNTER
Pt wife Jose Richards) is calling in regards to a referral for Dr. Ellen Haider. Pt is scheduled for 7.6. 21. Pt wife Jose Richards) advised that pt needs a physical. Please sign off if you agree with plan of care.

## 2021-06-21 ENCOUNTER — TELEPHONE (OUTPATIENT)
Dept: CASE MANAGEMENT | Age: 61
End: 2021-06-21

## 2021-06-21 DIAGNOSIS — G89.29 CHRONIC LOW BACK PAIN WITH SCIATICA, SCIATICA LATERALITY UNSPECIFIED, UNSPECIFIED BACK PAIN LATERALITY: Primary | ICD-10-CM

## 2021-06-21 DIAGNOSIS — M54.40 CHRONIC LOW BACK PAIN WITH SCIATICA, SCIATICA LATERALITY UNSPECIFIED, UNSPECIFIED BACK PAIN LATERALITY: Primary | ICD-10-CM

## 2021-06-21 NOTE — TELEPHONE ENCOUNTER
Pt wife Lissa Wiley) left voicemail requesting a referral for the pt. Pt is scheduled to be seen on 6.23.21. Please sign off if you agree with plan of care.

## 2021-06-23 ENCOUNTER — OFFICE VISIT (OUTPATIENT)
Dept: PAIN CLINIC | Facility: HOSPITAL | Age: 61
End: 2021-06-23
Attending: STUDENT IN AN ORGANIZED HEALTH CARE EDUCATION/TRAINING PROGRAM
Payer: MEDICARE

## 2021-06-23 VITALS — OXYGEN SATURATION: 99 % | DIASTOLIC BLOOD PRESSURE: 91 MMHG | SYSTOLIC BLOOD PRESSURE: 143 MMHG | HEART RATE: 81 BPM

## 2021-06-23 DIAGNOSIS — G89.29 CHRONIC LOW BACK PAIN WITH SCIATICA, SCIATICA LATERALITY UNSPECIFIED, UNSPECIFIED BACK PAIN LATERALITY: ICD-10-CM

## 2021-06-23 DIAGNOSIS — M54.40 CHRONIC LOW BACK PAIN WITH SCIATICA, SCIATICA LATERALITY UNSPECIFIED, UNSPECIFIED BACK PAIN LATERALITY: ICD-10-CM

## 2021-06-23 PROCEDURE — 99211 OFF/OP EST MAY X REQ PHY/QHP: CPT

## 2021-06-23 RX ORDER — GABAPENTIN 300 MG/1
300 CAPSULE ORAL NIGHTLY
Qty: 30 CAPSULE | Refills: 0 | Status: SHIPPED | OUTPATIENT
Start: 2021-06-23 | End: 2021-07-09

## 2021-06-23 RX ORDER — HYDROCODONE BITARTRATE AND ACETAMINOPHEN 5; 325 MG/1; MG/1
1 TABLET ORAL NIGHTLY PRN
Qty: 15 TABLET | Refills: 0 | Status: SHIPPED | OUTPATIENT
Start: 2021-06-23 | End: 2021-07-08

## 2021-06-23 NOTE — CHRONIC PAIN
[facility]  Pain Consultation     HISTORY OF PRESENT ILLNESS:  Elaine Orantes is a 59year old male with chronic low back pain and neck pain s/p skiing accident. Patient has had I ntermittent SHARON's with moderate relief.  Patient states that Anayeli Irwin MD;  Location: [address] [place] [address] ENDOSCOPY   • KNEE ARTHROSCOPY      Right knee arthroscopy   • OTHER SURGICAL HISTORY      Arthrocentesis of the right knee joint       REVIEW OF SYSTEMS:   Bowel/Bladder Incontinence: as above  Coughing/sneezing/straining augustin Vaping Use      Vaping Use: Never used    Substance and Sexual Activity      Alcohol use:  Yes        Alcohol/week: 2.0 standard drinks        Types: 2 Standard drinks or equivalent per week        Comment: social      Drug use: No      Sexual activity: Not Marital Status:   Intimate Partner Violence:       Fear of Current or Ex-Partner:       Emotionally Abused:       Physically Abused:       Sexually Abused:   PHYSICAL EXAMINATION:   06/23/21  0913   BP: (!) 143/91   BP Location: Right arm   Patient Positio  Negative.       LUMBAR DISC LEVELS:   L1-L2: Mild bulging disc with minimal ventral effacement.    L2-L3: Mild diffuse disc bulge with minimal ventral effacement and          bilateral foraminal encroachment, right greater than left   L3-L4: Diffuse disc o Results   Component Value Date     06/01/2020    K 4.4 06/01/2020     06/01/2020    CO2 27.0 06/01/2020    BUN 19 (H) 06/01/2020     (H) 06/01/2020    CA 8.9 06/01/2020     No results found for: PT    [place] PHYSICIAN MONITORING PROGRA

## 2021-06-23 NOTE — PROGRESS NOTES
PT presents ambulatory to the [facility].  Former PT with increased left sided sciatica.  Dr. Sandra Brumfield saw PT for  med eval.  See notes for POC

## 2021-07-09 ENCOUNTER — TELEPHONE (OUTPATIENT)
Dept: NEUROLOGY | Facility: CLINIC | Age: 61
End: 2021-07-09

## 2021-07-09 ENCOUNTER — OFFICE VISIT (OUTPATIENT)
Dept: NEUROLOGY | Facility: CLINIC | Age: 61
End: 2021-07-09
Payer: MEDICARE

## 2021-07-09 ENCOUNTER — HOSPITAL ENCOUNTER (OUTPATIENT)
Dept: GENERAL RADIOLOGY | Facility: HOSPITAL | Age: 61
Discharge: HOME | End: 2021-07-09
Attending: PHYSICAL MEDICINE & REHABILITATION
Payer: MEDICARE

## 2021-07-09 VITALS — HEIGHT: 66 IN | BODY MASS INDEX: 30.53 KG/M2 | WEIGHT: 190 LBS | HEART RATE: 94 BPM | OXYGEN SATURATION: 97 %

## 2021-07-09 DIAGNOSIS — M54.16 LUMBAR RADICULOPATHY: Primary | ICD-10-CM

## 2021-07-09 DIAGNOSIS — M54.16 LUMBAR RADICULOPATHY: ICD-10-CM

## 2021-07-09 PROCEDURE — 99204 OFFICE O/P NEW MOD 45 MIN: CPT | Performed by: PHYSICAL MEDICINE & REHABILITATION

## 2021-07-09 PROCEDURE — 72100 X-RAY EXAM L-S SPINE 2/3 VWS: CPT | Performed by: PHYSICAL MEDICINE & REHABILITATION

## 2021-07-09 PROCEDURE — 3008F BODY MASS INDEX DOCD: CPT | Performed by: PHYSICAL MEDICINE & REHABILITATION

## 2021-07-09 RX ORDER — DIAZEPAM 5 MG/1
TABLET ORAL
Qty: 3 TABLET | Refills: 0 | Status: SHIPPED | OUTPATIENT
Start: 2021-07-09 | End: 2021-09-08

## 2021-07-09 RX ORDER — TRAMADOL HYDROCHLORIDE 50 MG/1
50 TABLET ORAL EVERY 6 HOURS PRN
Qty: 60 TABLET | Refills: 0 | Status: SHIPPED | OUTPATIENT
Start: 2021-07-09 | End: 2021-08-24

## 2021-07-09 RX ORDER — GABAPENTIN 300 MG/1
300 CAPSULE ORAL NIGHTLY
Qty: 30 CAPSULE | Refills: 0 | Status: SHIPPED | OUTPATIENT
Start: 2021-07-09 | End: 2021-08-16

## 2021-07-09 NOTE — PROGRESS NOTES
[address]  Progress Note    CHIEF COMPLAINT:  Patient presents with:  Low Back Pain: Pt comes in with back pain left side that radiates to feet has N/T in al toes. Had injection epiderol. No imaging, PT.  In Types: 2 Standard drinks or equivalent per week        Comment: social      Drug use: No      Sexual activity: Not on file      CURRENT MEDICATIONS:   Current Outpatient Medications   Medication Sig Dispense Refill   • diazepam 5 MG Oral Tab 1 tablet nigh denies  Painful Urination: denies   Musculoskeletal  Joint Stiffness: denies  Painful Joints: denies  Tailbone Pain: denies  Swollen Joints: denies   Peripheral Vascular  Swelling of Legs/Feet: denies  Cold Extremities: admits   Skin  Open Sores: denies  N

## 2021-07-09 NOTE — TELEPHONE ENCOUNTER
Contacted [employer] Help online Tracking # [id number] to initiate authorization for L-Spine MRI CPT 80846 to be done at [address] [place] [address].     Status: Approved with authorization #[id number] valid 7/9/21-8/8/21  All cpt codes approved with this authorization are Trista, [address],

## 2021-07-12 ENCOUNTER — TELEPHONE (OUTPATIENT)
Dept: NEUROLOGY | Facility: CLINIC | Age: 61
End: 2021-07-12

## 2021-07-12 NOTE — TELEPHONE ENCOUNTER
----- Message from Mary Kate Olivo MD sent at 7/9/2021  1:15 PM CDT -----  I personally reviewed a plain film x-ray of the lumbar spine showing multilevel disc degeneration with endplate osteophytes and facet arthropathy.   6 lumbar type vertebrae with sm

## 2021-07-12 NOTE — TELEPHONE ENCOUNTER
Left detailed message informing patient of imaging results and recommendation per Dr. Chris Day. Instructed patient to call back with questions.

## 2021-07-14 ENCOUNTER — HOSPITAL ENCOUNTER (OUTPATIENT)
Dept: MRI IMAGING | Age: 61
Discharge: HOME | End: 2021-07-14
Attending: PHYSICAL MEDICINE & REHABILITATION
Payer: MEDICARE

## 2021-07-14 DIAGNOSIS — M54.16 LUMBAR RADICULOPATHY: ICD-10-CM

## 2021-07-14 PROCEDURE — 72148 MRI LUMBAR SPINE W/O DYE: CPT | Performed by: PHYSICAL MEDICINE & REHABILITATION

## 2021-07-15 ENCOUNTER — TELEPHONE (OUTPATIENT)
Dept: NEUROLOGY | Facility: CLINIC | Age: 61
End: 2021-07-15

## 2021-07-15 NOTE — TELEPHONE ENCOUNTER
Informed patient of imaging results and recommendation's per Dr. Kriss Diego. Patient verbalized understanding.

## 2021-07-15 NOTE — TELEPHONE ENCOUNTER
----- Message from Kelly Oneill MD sent at 7/14/2021  5:53 PM CDT -----  Personally reviewed a lumbar MRI showing a left/central moderately large L4-5 disc herniation causing central canal stenosis.   Other levels with disc degeneration but without gera

## 2021-07-16 ENCOUNTER — OFFICE VISIT (OUTPATIENT)
Dept: NEUROLOGY | Facility: CLINIC | Age: 61
End: 2021-07-16
Payer: MEDICARE

## 2021-07-16 ENCOUNTER — TELEPHONE (OUTPATIENT)
Dept: NEUROLOGY | Facility: CLINIC | Age: 61
End: 2021-07-16

## 2021-07-16 VITALS — BODY MASS INDEX: 30.53 KG/M2 | HEIGHT: 66 IN | HEART RATE: 78 BPM | OXYGEN SATURATION: 98 % | WEIGHT: 190 LBS

## 2021-07-16 DIAGNOSIS — M54.16 LUMBAR RADICULOPATHY: Primary | ICD-10-CM

## 2021-07-16 PROCEDURE — 99214 OFFICE O/P EST MOD 30 MIN: CPT | Performed by: PHYSICAL MEDICINE & REHABILITATION

## 2021-07-16 PROCEDURE — 3008F BODY MASS INDEX DOCD: CPT | Performed by: PHYSICAL MEDICINE & REHABILITATION

## 2021-07-16 NOTE — TELEPHONE ENCOUNTER
Left L5 (L5-S1) TFESI CPT CODE: 64483-APPROVED    [employer] online, request is authorized  Authorization # [id number] Valid 7/22/21 thru 9/05/21     Notified [facility] Approved Referrals for scheduling

## 2021-07-16 NOTE — TELEPHONE ENCOUNTER
Received [employer] determination letter for Left L5 (L5-S1) TFESI CPT CODE: 17420-[id number]         Authorization # [id number] Valid 7/22/21 thru 9/05/21           Determination letter sent to scanning

## 2021-07-16 NOTE — PROGRESS NOTES
[address]  Progress Note    CHIEF COMPLAINT:  Patient presents with:  Back Pain: LOV 7/9/21 Pt comes in for f/u back pain. radiates down left side to foot including all digits states he has N/T.  [facility] Initial Evaluation-  Vestibular Therapy    Patient Name: Louis Villalba       Patient MRN: [id number]  : 10/20/1934  Physician:Adam Feliz MD  Date: 12/15/2020  Encounter Diagnoses   Name Primary?   • BPPV (benign paroxysmal positional vertigo), bilateral Yes   • Vertigo        Objective Testing:  Positional Testing  Positional Testing: With infrared goggles  Vertebrobasilar Artery Screen - Right: Negative  Vertebrobasilar Artery Screen - Left: Negative  Alma-Hallpike Right: Upbeat, left rotatory nystagmus  Peewee-Hallpike Right Onset Time : 6 sec  Alma-Hallpike Right Duration Time : 15 sec  Peewee-Hallpike Left: Upbeat, left rotatory nystagmus  Alma-Hallpike Left Onset Time : 7 sec  Peewee-Hallpike Left Duration Time : 1 min  Horizontal Roll Test Right: (NA)  Horizontal Roll Test Left: (NA)     Occulomotor Exam Fixation Present  Spontaneous Nystagmus: Absent  VOR with Occulomotor Exam Fixation Absent   Spontaneous Nystagmus: Absent   DHI: 52/100, mod perception of handicap    THERAPY ASSESSMENT: Patient is a 86 y.o. male. Patient presents to physical therapy due to complaints of episodes of dizziness and imbalance. Symptoms originally started in 2020, then resolved and returned in Oct 2020.This sent him to the ED on 2020. Dx with vertigo.  He reports 1 fall last Friday. Signs and symptoms are consistent with bilateral BPPV. Today R PC canalithiasis BPPV was treated with CRP. He also tested positive for L PC cupulolithiasis BPPV.  There was lateral nystagmus observed as well which may indicate LC involvement. No signs of CNS involvement. Patient is a good candidate for physical therapy to address the following:    Functional Limitations: Walking, Difficulty moving, Decreased ability to perform ADL's   Length of Therapy: 1 month   PT Frequency: (1-2 x a week)   PT Interventions: Home Exercise Program, CRP   Patient Agrees with Plan of Care: Yes    REHAB POTENTIAL: excellent             SHORT TERM GOALS: To be met in 2 weeks:  1. Patient is independent with HEP.  2. Patient will report at least 30% improvement in overall condition.  LONG TERM GOALS:To be met in 4 weeks:  1. Patient will report decreased disequilibrium/dizziness by at least 90%.  2. Patient will report no loss of balance with ADLs to demonstrate improved functional balance.  3. Patient denies dizziness with daily activity.  4. DHI score is less than 10.     Other Procedure CPT 58854 minutes 0    Timed Code Treatment: 0   Minutes     Total Treatment Time: 45      Minutes      PT SIGNATURE: Opal Esquivel PT, DPT, CHT   [place] license #[id number]  DATE TREATMENT INITIATED: 12/15/2020     Initial Certification  Certification Period: 3/15/2021  I certify that the therapy services are furnished while this patient is under my care.  The services outlined above are required by this patient, and will be reviewed every 90 days.     PHYSICIAN: Adam Feliz MD      DATE:     Please sign and return via fax to [phone].. Thank you, [facility].         activity: Not on file      CURRENT MEDICATIONS:   Current Outpatient Medications   Medication Sig Dispense Refill   • gabapentin 300 MG Oral Cap Take 1 capsule (300 mg total) by mouth 2 (two) times a day.  60 capsule 0   • leflunomide 20 MG Oral Tab Take 1 degeneration with endplate osteophytes and facet arthropathy.  6 lumbar type vertebrae with small T12 ribs.   2.  I personally reviewed a lumbar MRI showing a left/central moderately large L4-5 disc herniation causing central canal stenosis.  Other levels w

## 2021-07-21 NOTE — TELEPHONE ENCOUNTER
Patient has been scheduled for a  Left L5 (L5-S1) TFESI  on 7/22/21 at the [facility]. Medications and allergies reviewed.  Patient informed we will need verbal or written authorization from patients Primary Care Physician/Cardiologist to hold blood thinners 3-5

## 2021-07-22 ENCOUNTER — OFFICE VISIT (OUTPATIENT)
Dept: SURGERY | Facility: CLINIC | Age: 61
End: 2021-07-22

## 2021-07-22 DIAGNOSIS — M54.16 LUMBAR RADICULOPATHY: Primary | ICD-10-CM

## 2021-07-22 PROCEDURE — 64483 NJX AA&/STRD TFRM EPI L/S 1: CPT | Performed by: PHYSICAL MEDICINE & REHABILITATION

## 2021-07-22 PROCEDURE — 99152 MOD SED SAME PHYS/QHP 5/>YRS: CPT | Performed by: PHYSICAL MEDICINE & REHABILITATION

## 2021-07-22 PROCEDURE — 99153 MOD SED SAME PHYS/QHP EA: CPT | Performed by: PHYSICAL MEDICINE & REHABILITATION

## 2021-07-26 ENCOUNTER — TELEPHONE (OUTPATIENT)
Dept: NEUROLOGY | Facility: CLINIC | Age: 61
End: 2021-07-26

## 2021-07-26 NOTE — TELEPHONE ENCOUNTER
Patient is asking if Dr Dany Valdez can prescribe Norco instead of Tramadol. The Tramadol is not working for him, it does not help with pain.

## 2021-07-26 NOTE — TELEPHONE ENCOUNTER
Patient's wife and I spoke for rx needing a PA. Patient's insurance will send over paperwork, as well as I've given information on Jose M Ritter.

## 2021-07-26 NOTE — TELEPHONE ENCOUNTER
patient was referred to Dr Ton Pack for surgery, they would like to know which Dr Ton Pack? and whats his phone #?

## 2021-07-27 ENCOUNTER — TELEPHONE (OUTPATIENT)
Dept: ADMINISTRATIVE | Age: 61
End: 2021-07-27

## 2021-07-27 DIAGNOSIS — M50.20 HERNIATED CERVICAL DISC: Primary | ICD-10-CM

## 2021-07-27 NOTE — TELEPHONE ENCOUNTER
Hi Dr. Randy Matson,    Dr. General Ayala is referring patient to Dr. Gregor Riojas for herniated disk surgery. Please sign off on referral if you agree with plan of care. Thank you.     Managed Care

## 2021-08-10 ENCOUNTER — PATIENT MESSAGE (OUTPATIENT)
Dept: NEUROLOGY | Facility: CLINIC | Age: 61
End: 2021-08-10

## 2021-08-10 NOTE — TELEPHONE ENCOUNTER
From: Carlos James  To: Butch Sofia MD  Sent: 8/10/2021 9:34 AM CDT  Subject: Visit Follow-up Question    At My last appointment with Dr. Hayden, he suggested two surgeons to me.  one being Dr. Ton Pack of which the earliest appointment i could make wa

## 2021-08-13 ENCOUNTER — PATIENT MESSAGE (OUTPATIENT)
Dept: NEUROLOGY | Facility: CLINIC | Age: 61
End: 2021-08-13

## 2021-08-13 NOTE — TELEPHONE ENCOUNTER
From: Estefania Montelongo  To: Chris Benites MD  Sent: 8/13/2021 8:53 AM CDT  Subject: Visit Follow-up Question    I'm hoping Dr. Thuan Nichole had the opportunity to get me that referral for a second surgeon. , Since Dr. Shara Arias cannot get me an appointment until [facility]

## 2021-08-16 RX ORDER — GABAPENTIN 300 MG/1
CAPSULE ORAL
Qty: 30 CAPSULE | Refills: 0 | Status: SHIPPED | OUTPATIENT
Start: 2021-08-16 | End: 2021-08-24

## 2021-08-16 NOTE — TELEPHONE ENCOUNTER
Gabapentin refilled. Will continue this until he gets surgery. Check next time to see whether surgery is happened or not.

## 2021-08-16 NOTE — TELEPHONE ENCOUNTER
Medication request:   gabapentin 300 MG Oral Cap  Take 1 capsule (300 mg total) by mouth nightly  LOV: 07/16/21  NOV: 08/24/21    [place] 07[place]16/21 #30 R-0

## 2021-08-24 ENCOUNTER — OFFICE VISIT (OUTPATIENT)
Dept: NEUROLOGY | Facility: CLINIC | Age: 61
End: 2021-08-24
Payer: MEDICARE

## 2021-08-24 VITALS — OXYGEN SATURATION: 98 % | HEART RATE: 87 BPM | WEIGHT: 190 LBS | HEIGHT: 66 IN | BODY MASS INDEX: 30.53 KG/M2

## 2021-08-24 DIAGNOSIS — M06.9 RHEUMATOID ARTHRITIS INVOLVING MULTIPLE SITES, UNSPECIFIED WHETHER RHEUMATOID FACTOR PRESENT (HCC): ICD-10-CM

## 2021-08-24 DIAGNOSIS — G89.4 CHRONIC PAIN SYNDROME: ICD-10-CM

## 2021-08-24 DIAGNOSIS — M51.26 LUMBAR DISC HERNIATION: Primary | ICD-10-CM

## 2021-08-24 PROCEDURE — 99214 OFFICE O/P EST MOD 30 MIN: CPT | Performed by: PHYSICAL MEDICINE & REHABILITATION

## 2021-08-24 PROCEDURE — 3008F BODY MASS INDEX DOCD: CPT | Performed by: PHYSICAL MEDICINE & REHABILITATION

## 2021-08-24 RX ORDER — GABAPENTIN 300 MG/1
300 CAPSULE ORAL NIGHTLY
Qty: 30 CAPSULE | Refills: 0 | Status: SHIPPED | OUTPATIENT
Start: 2021-08-24 | End: 2021-09-15

## 2021-08-24 RX ORDER — HYDROCODONE BITARTRATE AND ACETAMINOPHEN 7.5; 325 MG/1; MG/1
1 TABLET ORAL EVERY 6 HOURS PRN
Qty: 60 TABLET | Refills: 0 | Status: SHIPPED | OUTPATIENT
Start: 2021-08-24

## 2021-08-24 NOTE — PROGRESS NOTES
[address]  Progress Note    CHIEF COMPLAINT:  Patient presents with:  Back Pain: LOV Pt comes in for post injection visit, states only lasted a couple of days. States in as much pain as the beginning.  Takes Oral Cap Take 1 capsule (300 mg total) by mouth nightly. 30 capsule 0   • HYDROcodone-acetaminophen 7.5-325 MG Oral Tab Take 1 tablet by mouth every 6 (six) hours as needed for Pain.  60 tablet 0   • diazepam 5 MG Oral Tab 1 tablet night before procedure, 1 distress  Extremities: No lower extremity edema bilaterally   Spine: Painful flexion, prefers to stand  Hips: full and painfree ROM   Neuro:   Cognition: alert & oriented x 3, attentive, able to follow 2 step commands, comprehention intact, spontaneous spe

## 2021-08-27 ENCOUNTER — TELEPHONE (OUTPATIENT)
Dept: ADMINISTRATIVE | Age: 61
End: 2021-08-27

## 2021-08-27 DIAGNOSIS — M50.20 HERNIATED CERVICAL DISC: Primary | ICD-10-CM

## 2021-08-27 NOTE — TELEPHONE ENCOUNTER
Dr. Teresa Caraballo,    Patient would like a 2nd opinion for surgery with Dr. Cobian Congregational referral please review diagnosis and sign off if you agree. Thank you.   Mohit Kellogg

## 2021-08-31 NOTE — TELEPHONE ENCOUNTER
Encounter Messages    Read Composed From To  Subject   Y 8/30/2021  9:36 AM Roselyn Cano  Referral   Referral  Message [phone]  From   Sharon Yoon To   Haritha Coleman and Delivered   8/30/2021  9:36 AM   Last Read in My

## 2021-09-06 PROBLEM — M54.16 LUMBAR RADICULOPATHY: Status: ACTIVE | Noted: 2021-09-06

## 2021-09-08 ENCOUNTER — OFFICE VISIT (OUTPATIENT)
Dept: FAMILY MEDICINE CLINIC | Facility: CLINIC | Age: 61
End: 2021-09-08
Payer: MEDICARE

## 2021-09-08 ENCOUNTER — HOSPITAL ENCOUNTER (OUTPATIENT)
Dept: GENERAL RADIOLOGY | Age: 61
Discharge: HOME | End: 2021-09-08
Attending: NEUROLOGICAL SURGERY
Payer: MEDICARE

## 2021-09-08 ENCOUNTER — LAB ENCOUNTER (OUTPATIENT)
Dept: LAB | Age: 61
End: 2021-09-08
Attending: NEUROLOGICAL SURGERY
Payer: MEDICARE

## 2021-09-08 VITALS
BODY MASS INDEX: 32.81 KG/M2 | HEIGHT: 66 IN | TEMPERATURE: 98 F | DIASTOLIC BLOOD PRESSURE: 76 MMHG | RESPIRATION RATE: 18 BRPM | SYSTOLIC BLOOD PRESSURE: 136 MMHG | WEIGHT: 204.13 LBS | HEART RATE: 60 BPM

## 2021-09-08 DIAGNOSIS — M05.749 RHEUMATOID ARTHRITIS INVOLVING HAND WITH POSITIVE RHEUMATOID FACTOR, UNSPECIFIED LATERALITY (HCC): ICD-10-CM

## 2021-09-08 DIAGNOSIS — Z01.812 PRE-PROCEDURE LAB EXAM: ICD-10-CM

## 2021-09-08 DIAGNOSIS — M51.26 LUMBAR DISC HERNIATION: Primary | ICD-10-CM

## 2021-09-08 DIAGNOSIS — E78.00 PURE HYPERCHOLESTEROLEMIA: ICD-10-CM

## 2021-09-08 DIAGNOSIS — Z01.810 PREPROCEDURAL CARDIOVASCULAR EXAMINATION: ICD-10-CM

## 2021-09-08 DIAGNOSIS — Z01.811 ENCOUNTER FOR PREPROCEDURAL RESPIRATORY EXAMINATION: ICD-10-CM

## 2021-09-08 LAB
ANION GAP SERPL CALC-SCNC: 5 MMOL/L (ref 0–18)
BASOPHILS # BLD AUTO: 0.05 X10(3) UL (ref 0–0.2)
BASOPHILS NFR BLD AUTO: 1 %
BILIRUB UR QL: NEGATIVE
BUN BLD-MCNC: 18 MG/DL (ref 7–18)
BUN/CREAT SERPL: 23.7 (ref 10–20)
CALCIUM BLD-MCNC: 9.1 MG/DL (ref 8.5–10.1)
CHLORIDE SERPL-SCNC: 107 MMOL/L (ref 98–112)
CLARITY UR: CLEAR
CO2 SERPL-SCNC: 27 MMOL/L (ref 21–32)
COLOR UR: YELLOW
CREAT BLD-MCNC: 0.76 MG/DL
DEPRECATED RDW RBC AUTO: 39.4 FL (ref 35.1–46.3)
EOSINOPHIL # BLD AUTO: 0.07 X10(3) UL (ref 0–0.7)
EOSINOPHIL NFR BLD AUTO: 1.4 %
ERYTHROCYTE [DISTWIDTH] IN BLOOD BY AUTOMATED COUNT: 13 % (ref 11–15)
GLUCOSE BLD-MCNC: 112 MG/DL (ref 70–99)
GLUCOSE UR-MCNC: NEGATIVE MG/DL
HCT VFR BLD AUTO: 46 %
HGB BLD-MCNC: 14.4 G/DL
HGB UR QL STRIP.AUTO: NEGATIVE
IMM GRANULOCYTES # BLD AUTO: 0.02 X10(3) UL (ref 0–1)
IMM GRANULOCYTES NFR BLD: 0.4 %
INR BLD: 0.97 (ref 0.9–1.2)
KETONES UR-MCNC: NEGATIVE MG/DL
LEUKOCYTE ESTERASE UR QL STRIP.AUTO: NEGATIVE
LYMPHOCYTES # BLD AUTO: 1.21 X10(3) UL (ref 1–4)
LYMPHOCYTES NFR BLD AUTO: 23.5 %
MCH RBC QN AUTO: 26 PG (ref 26–34)
MCHC RBC AUTO-ENTMCNC: 31.3 G/DL (ref 31–37)
MCV RBC AUTO: 83.2 FL
MONOCYTES # BLD AUTO: 0.58 X10(3) UL (ref 0.1–1)
MONOCYTES NFR BLD AUTO: 11.3 %
NEUTROPHILS # BLD AUTO: 3.22 X10 (3) UL (ref 1.5–7.7)
NEUTROPHILS # BLD AUTO: 3.22 X10(3) UL (ref 1.5–7.7)
NEUTROPHILS NFR BLD AUTO: 62.4 %
NITRITE UR QL STRIP.AUTO: NEGATIVE
OSMOLALITY SERPL CALC.SUM OF ELEC: 291 MOSM/KG (ref 275–295)
PATIENT FASTING Y/N/NP: NO
PH UR: 7 [PH] (ref 5–8)
PLATELET # BLD AUTO: 198 10(3)UL (ref 150–450)
POTASSIUM SERPL-SCNC: 4.5 MMOL/L (ref 3.5–5.1)
PROT UR-MCNC: 30 MG/DL
PROTHROMBIN TIME: 12.7 SECONDS (ref 11.8–14.5)
RBC # BLD AUTO: 5.53 X10(6)UL
SODIUM SERPL-SCNC: 139 MMOL/L (ref 136–145)
SP GR UR STRIP: 1.02 (ref 1–1.03)
UROBILINOGEN UR STRIP-ACNC: <2
WBC # BLD AUTO: 5.2 X10(3) UL (ref 4–11)

## 2021-09-08 PROCEDURE — 81001 URINALYSIS AUTO W/SCOPE: CPT

## 2021-09-08 PROCEDURE — 99214 OFFICE O/P EST MOD 30 MIN: CPT | Performed by: FAMILY MEDICINE

## 2021-09-08 PROCEDURE — 36415 COLL VENOUS BLD VENIPUNCTURE: CPT

## 2021-09-08 PROCEDURE — 93005 ELECTROCARDIOGRAM TRACING: CPT

## 2021-09-08 PROCEDURE — 3078F DIAST BP <80 MM HG: CPT | Performed by: FAMILY MEDICINE

## 2021-09-08 PROCEDURE — 3075F SYST BP GE 130 - 139MM HG: CPT | Performed by: FAMILY MEDICINE

## 2021-09-08 PROCEDURE — 93010 ELECTROCARDIOGRAM REPORT: CPT | Performed by: NEUROLOGICAL SURGERY

## 2021-09-08 PROCEDURE — 87641 MR-STAPH DNA AMP PROBE: CPT

## 2021-09-08 PROCEDURE — 85025 COMPLETE CBC W/AUTO DIFF WBC: CPT

## 2021-09-08 PROCEDURE — 80048 BASIC METABOLIC PNL TOTAL CA: CPT

## 2021-09-08 PROCEDURE — 3008F BODY MASS INDEX DOCD: CPT | Performed by: FAMILY MEDICINE

## 2021-09-08 PROCEDURE — 71046 X-RAY EXAM CHEST 2 VIEWS: CPT | Performed by: NEUROLOGICAL SURGERY

## 2021-09-08 PROCEDURE — 85610 PROTHROMBIN TIME: CPT

## 2021-09-08 RX ORDER — TRAMADOL HYDROCHLORIDE 50 MG/1
TABLET ORAL
COMMUNITY
End: 2021-09-08

## 2021-09-08 NOTE — PROGRESS NOTES
REASON FOR VISIT:    Johan Carpio is a 59year old male who presents for an [address]         Patient Active Problem List:     Neck pain     Hyperlipidemia     Tobacco dependence     Insomnia due to psychological stress     Arthritis Screening Screen pts at high risk plus screen one time for adults born Geno Mendoza (no units)   Date Value   03/16/2020 Nonreactive       Tuberculosis Screen If high risk No components found for: PPDINDURAT       SPECIFIC DISEASE Senthil Gao social    Drug use: No    Occ:  :       REVIEW OF SYSTEMS:   GENERAL: feels well otherwise  SKIN: denies any unusual skin lesions  EYES: denies blurred vision or double vision  HEENT: denies nasal congestion, sinus pain or ST  LUNGS: denies shortnes Influenza, Pneumococcal, Zoster, Tetanus, HPV   Influenza: No recommendations at this time  Pneumonia: No recommendations at this time  HPV: No recommendations at this time  Tdap: No recommendations at this time  Shingles: Shingrix shingles vaccine is due

## 2021-09-09 LAB — MRSA DNA SPEC QL NAA+PROBE: NEGATIVE

## 2021-09-10 ENCOUNTER — TELEPHONE (OUTPATIENT)
Dept: NEUROLOGY | Facility: CLINIC | Age: 61
End: 2021-09-10

## 2021-09-10 DIAGNOSIS — Z51.81 ENCOUNTER FOR THERAPEUTIC DRUG MONITORING: Primary | ICD-10-CM

## 2021-09-10 RX ORDER — LEFLUNOMIDE 20 MG/1
20 TABLET ORAL DAILY
Qty: 30 TABLET | Refills: 0 | Status: SHIPPED | OUTPATIENT
Start: 2021-09-10 | End: 2021-09-14

## 2021-09-10 NOTE — TELEPHONE ENCOUNTER
gabapentin 300 MG Oral Cap  - Dr Gulshan Moser prescribed this medication and told Tyson Boyer verbally that he could take up 2 a day, so thats what he was doing so now he's completely out of medication. The pharmacy wont prescribe him any more.       Can Dr Gulshan Moser send

## 2021-09-10 NOTE — TELEPHONE ENCOUNTER
Will only fill for 1 month, he was last seen in November. Also needs to get blood work done.   He needs to make a follow-up appointment soon

## 2021-09-10 NOTE — TELEPHONE ENCOUNTER
Order pended.      LOV: 11/02/2020  Future Appointments   Date Time Provider Regan Bridgette   9/20/2021  8:00 AM [facility] SCHEDULED [facility] [place]   LABS:  Component      Latest Ref Rng & Units 4/9/2021   WBC      4.0 - 11.0 x10(3) uL 6.0   RBC Continue leflunomide 20 mg daily  - Plan to decrease prednisone. Take 2.5 mg daily for 2 weeks and then 2.5 mg every other day for 2 weeks and then stop  - blood work in 3 mos      Positive Quanteferon TB  - Patient was seen by [facility] ID.   He was placed on

## 2021-09-10 NOTE — TELEPHONE ENCOUNTER
Patient's wife is requesting a refill to [facility] in [place]. Patient has 1 pill left. Current Outpatient Medications   Medication Sig Dispense Refill   • leflunomide 20 MG Oral Tab Take 1 tablet (20 mg total) by mouth daily.  90 tablet 1

## 2021-09-10 NOTE — TELEPHONE ENCOUNTER
We can see if they can add it on, If you can call the lab to to see, if not he can get it done at his appt

## 2021-09-10 NOTE — TELEPHONE ENCOUNTER
Dr. Qian Marquis, you can either add current lab to existing lab specimen from two days or the patient will complete them on the day of appointment.      Future Appointments   Date Time Provider Regan Angeles   9/14/2021  8:40 AM Avila Mascorro MD 2014 [facility]

## 2021-09-14 ENCOUNTER — OFFICE VISIT (OUTPATIENT)
Dept: RHEUMATOLOGY | Facility: CLINIC | Age: 61
End: 2021-09-14
Payer: MEDICARE

## 2021-09-14 ENCOUNTER — LAB ENCOUNTER (OUTPATIENT)
Dept: LAB | Facility: HOSPITAL | Age: 61
End: 2021-09-14
Attending: INTERNAL MEDICINE
Payer: MEDICARE

## 2021-09-14 VITALS
HEIGHT: 66 IN | SYSTOLIC BLOOD PRESSURE: 124 MMHG | WEIGHT: 204 LBS | HEART RATE: 91 BPM | BODY MASS INDEX: 32.78 KG/M2 | DIASTOLIC BLOOD PRESSURE: 87 MMHG

## 2021-09-14 DIAGNOSIS — M19.90 INFLAMMATORY ARTHRITIS: Primary | ICD-10-CM

## 2021-09-14 DIAGNOSIS — Z51.81 ENCOUNTER FOR THERAPEUTIC DRUG MONITORING: ICD-10-CM

## 2021-09-14 LAB
ALBUMIN SERPL-MCNC: 4.1 G/DL (ref 3.4–5)
ALT SERPL-CCNC: 37 U/L
AST SERPL-CCNC: 19 U/L (ref 15–37)
CRP SERPL-MCNC: 0.64 MG/DL (ref ?–0.3)
ERYTHROCYTE [SEDIMENTATION RATE] IN BLOOD: 6 MM/HR

## 2021-09-14 PROCEDURE — 36415 COLL VENOUS BLD VENIPUNCTURE: CPT

## 2021-09-14 PROCEDURE — 82040 ASSAY OF SERUM ALBUMIN: CPT

## 2021-09-14 PROCEDURE — 99214 OFFICE O/P EST MOD 30 MIN: CPT | Performed by: INTERNAL MEDICINE

## 2021-09-14 PROCEDURE — 84450 TRANSFERASE (AST) (SGOT): CPT

## 2021-09-14 PROCEDURE — 86140 C-REACTIVE PROTEIN: CPT

## 2021-09-14 PROCEDURE — 84460 ALANINE AMINO (ALT) (SGPT): CPT

## 2021-09-14 PROCEDURE — 3074F SYST BP LT 130 MM HG: CPT | Performed by: INTERNAL MEDICINE

## 2021-09-14 PROCEDURE — 85652 RBC SED RATE AUTOMATED: CPT

## 2021-09-14 PROCEDURE — 3079F DIAST BP 80-89 MM HG: CPT | Performed by: INTERNAL MEDICINE

## 2021-09-14 PROCEDURE — 3008F BODY MASS INDEX DOCD: CPT | Performed by: INTERNAL MEDICINE

## 2021-09-14 RX ORDER — LEFLUNOMIDE 20 MG/1
20 TABLET ORAL DAILY
Qty: 90 TABLET | Refills: 0 | Status: SHIPPED | OUTPATIENT
Start: 2021-09-14 | End: 2021-12-29

## 2021-09-14 NOTE — PATIENT INSTRUCTIONS
You were seen today for RA  Symptoms better controlled  Continue Leflunomide 20 mg daily  Blood work December and March  Follow up in 4 mos

## 2021-09-14 NOTE — PROGRESS NOTES
Grupo Arias is a 59year old male. HPI:   Patient presents with:   Follow - Up        Presents today 9/14/2021 for f/u of PMR vs Seronegative RA    Current Medications:  Leflunomide 20 mg daily- started June 2020  Blood work:  Neg SAPPHIRE, RF, CCP, ESR, hand but overall doing well. No swelling. Able to make a full fist  He is been having worsening lower back pain with radiculopathy symptoms down his left leg. He has had 2 epidural injections but they did not help.   He will be following with neurosurgeo Imaging:     MRI L spine:  1. Multilevel degenerative changes of the lumbar spine as detailed. Notable levels as follows:   2.  L4-L5:  Broad-based left paracentral/subarticular zone disc protrusion/extrusion, which results in moderate to severe left la [facility] TB  - Patient was seen by [facility].   He was placed on rifampin in 5/62020 and completed treatment    Pt will f/u in 3-4 mos    Antony Romano MD  9/14/2021  8:40 AM

## 2021-09-15 RX ORDER — GABAPENTIN 300 MG/1
300 CAPSULE ORAL 2 TIMES DAILY
Qty: 60 CAPSULE | Refills: 0 | Status: SHIPPED | OUTPATIENT
Start: 2021-09-15

## 2021-09-15 NOTE — TELEPHONE ENCOUNTER
Medication request: gabapentin 300 MG Oral Cap    Sig:   Take 2 capsule (300 mg total)     LOV: 8/24/21  NOV: None    [place] 9/11/21 qty#30 r-0      Can we rewrite the order?

## 2021-10-01 ENCOUNTER — LAB REQUISITION (OUTPATIENT)
Dept: SURGERY | Age: 61
End: 2021-10-01
Payer: MEDICARE

## 2021-10-01 DIAGNOSIS — Z01.818 PREOP EXAMINATION: ICD-10-CM

## 2021-11-02 ENCOUNTER — TELEPHONE (OUTPATIENT)
Dept: FAMILY MEDICINE CLINIC | Facility: CLINIC | Age: 61
End: 2021-11-02

## 2021-11-02 DIAGNOSIS — Z47.89 ORTHOTIC TRAINING: ICD-10-CM

## 2021-11-02 DIAGNOSIS — M43.06 SPONDYLOLYSIS, LUMBAR REGION: ICD-10-CM

## 2021-11-02 DIAGNOSIS — M51.26 LUMBAR DISC HERNIATION: Primary | ICD-10-CM

## 2021-11-02 NOTE — TELEPHONE ENCOUNTER
[facility] Referal request received by [facility] for 6w, see order. Please fax to [phone] once approved. Eval/plan faxed to [phone] w/ confirmation , notified of pending referral on fax cover.

## 2021-12-29 DIAGNOSIS — Z51.81 ENCOUNTER FOR THERAPEUTIC DRUG MONITORING: ICD-10-CM

## 2021-12-29 DIAGNOSIS — M19.90 INFLAMMATORY ARTHRITIS: ICD-10-CM

## 2021-12-29 RX ORDER — LEFLUNOMIDE 20 MG/1
20 TABLET ORAL DAILY
Qty: 90 TABLET | Refills: 0 | Status: SHIPPED | OUTPATIENT
Start: 2021-12-29 | End: 2021-12-30

## 2021-12-29 NOTE — TELEPHONE ENCOUNTER
Current Outpatient Medications   Medication Sig Dispense Refill   • leflunomide 20 MG Oral Tab Take 1 tablet (20 mg total) by mouth daily.  90 tablet 0

## 2021-12-29 NOTE — TELEPHONE ENCOUNTER
LOV: 09/14/2021  No future appointments.   LABS:  Component      Latest Ref Rng & Units 9/14/2021   Albumin      3.4 - 5.0 g/dL 4.1   ALT (SGPT)      16 - 61 U/L 37   AST (SGOT)      15 - 37 U/L 19   C-REACTIVE PROTEIN      <0.30 mg/dL 0.64 (H)   SED RATE

## 2021-12-30 RX ORDER — LEFLUNOMIDE 20 MG/1
20 TABLET ORAL DAILY
Qty: 90 TABLET | Refills: 0 | Status: SHIPPED | OUTPATIENT
Start: 2021-12-30

## 2021-12-30 NOTE — TELEPHONE ENCOUNTER
Spoke with patient and patients wife. The price of their leflunomide increased to $200 at [facility] and $400 at [facility] would like sent to [facility]. Confirmed address. Script resent to corrected pharmacy.   Mendel Rand scheduled an appointment an

## 2022-01-10 ENCOUNTER — LAB ENCOUNTER (OUTPATIENT)
Dept: LAB | Age: 62
End: 2022-01-10
Attending: INTERNAL MEDICINE
Payer: MEDICARE

## 2022-01-10 DIAGNOSIS — Z51.81 ENCOUNTER FOR THERAPEUTIC DRUG MONITORING: ICD-10-CM

## 2022-01-10 DIAGNOSIS — M19.90 INFLAMMATORY ARTHRITIS: ICD-10-CM

## 2022-01-10 LAB
ALBUMIN SERPL-MCNC: 4.2 G/DL (ref 3.4–5)
ALT SERPL-CCNC: 33 U/L
AST SERPL-CCNC: 18 U/L (ref 15–37)
BASOPHILS # BLD AUTO: 0.04 X10(3) UL (ref 0–0.2)
BASOPHILS NFR BLD AUTO: 0.9 %
CREAT BLD-MCNC: 0.79 MG/DL
CRP SERPL-MCNC: <0.29 MG/DL (ref ?–0.3)
DEPRECATED RDW RBC AUTO: 38.5 FL (ref 35.1–46.3)
EOSINOPHIL # BLD AUTO: 0.07 X10(3) UL (ref 0–0.7)
EOSINOPHIL NFR BLD AUTO: 1.6 %
ERYTHROCYTE [DISTWIDTH] IN BLOOD BY AUTOMATED COUNT: 12.9 % (ref 11–15)
ERYTHROCYTE [SEDIMENTATION RATE] IN BLOOD: 5 MM/HR
HCT VFR BLD AUTO: 43.4 %
HGB BLD-MCNC: 14.1 G/DL
IMM GRANULOCYTES # BLD AUTO: 0.01 X10(3) UL (ref 0–1)
IMM GRANULOCYTES NFR BLD: 0.2 %
LYMPHOCYTES # BLD AUTO: 1.07 X10(3) UL (ref 1–4)
LYMPHOCYTES NFR BLD AUTO: 24.6 %
MCH RBC QN AUTO: 26.6 PG (ref 26–34)
MCHC RBC AUTO-ENTMCNC: 32.5 G/DL (ref 31–37)
MCV RBC AUTO: 81.7 FL
MONOCYTES # BLD AUTO: 0.38 X10(3) UL (ref 0.1–1)
MONOCYTES NFR BLD AUTO: 8.7 %
NEUTROPHILS # BLD AUTO: 2.78 X10 (3) UL (ref 1.5–7.7)
NEUTROPHILS # BLD AUTO: 2.78 X10(3) UL (ref 1.5–7.7)
NEUTROPHILS NFR BLD AUTO: 64 %
PLATELET # BLD AUTO: 181 10(3)UL (ref 150–450)
RBC # BLD AUTO: 5.31 X10(6)UL
WBC # BLD AUTO: 4.4 X10(3) UL (ref 4–11)

## 2022-01-10 PROCEDURE — 82040 ASSAY OF SERUM ALBUMIN: CPT

## 2022-01-10 PROCEDURE — 85025 COMPLETE CBC W/AUTO DIFF WBC: CPT

## 2022-01-10 PROCEDURE — 84450 TRANSFERASE (AST) (SGOT): CPT

## 2022-01-10 PROCEDURE — 84460 ALANINE AMINO (ALT) (SGPT): CPT

## 2022-01-10 PROCEDURE — 36415 COLL VENOUS BLD VENIPUNCTURE: CPT

## 2022-01-10 PROCEDURE — 85652 RBC SED RATE AUTOMATED: CPT

## 2022-01-10 PROCEDURE — 86140 C-REACTIVE PROTEIN: CPT

## 2022-01-10 PROCEDURE — 82565 ASSAY OF CREATININE: CPT

## 2022-01-14 ENCOUNTER — OFFICE VISIT (OUTPATIENT)
Dept: RHEUMATOLOGY | Facility: CLINIC | Age: 62
End: 2022-01-14
Payer: MEDICARE

## 2022-01-14 VITALS
BODY MASS INDEX: 32.14 KG/M2 | WEIGHT: 200 LBS | HEART RATE: 74 BPM | SYSTOLIC BLOOD PRESSURE: 148 MMHG | HEIGHT: 66 IN | DIASTOLIC BLOOD PRESSURE: 90 MMHG

## 2022-01-14 DIAGNOSIS — Z51.81 ENCOUNTER FOR THERAPEUTIC DRUG MONITORING: ICD-10-CM

## 2022-01-14 DIAGNOSIS — M19.90 INFLAMMATORY ARTHRITIS: Primary | ICD-10-CM

## 2022-01-14 PROCEDURE — 3077F SYST BP >= 140 MM HG: CPT | Performed by: INTERNAL MEDICINE

## 2022-01-14 PROCEDURE — 99214 OFFICE O/P EST MOD 30 MIN: CPT | Performed by: INTERNAL MEDICINE

## 2022-01-14 PROCEDURE — 3080F DIAST BP >= 90 MM HG: CPT | Performed by: INTERNAL MEDICINE

## 2022-01-14 PROCEDURE — 3008F BODY MASS INDEX DOCD: CPT | Performed by: INTERNAL MEDICINE

## 2022-01-14 NOTE — PROGRESS NOTES
Leatha Moran is a 59year old male. HPI:   Patient presents with:   Follow - Up  Medication Follow-Up        Presents today 1/14/20221 for f/u of PMR vs Seronegative RA    Current Medications:  Leflunomide 20 mg daily- started June 2020  Blood work: mild stiffness in his hand but overall doing well. No swelling. Able to make a full fist  He is been having worsening lower back pain with radiculopathy symptoms down his left leg. He has had 2 epidural injections but they did not help.   He will be foll intact.   PSYCH: normal mood       LABS:     Component      Latest Ref Rng & Units 3/16/2020 2/20/2020   C-Citrullinated Peptide IgG AB      0.0 - 6.9 U/mL  <0.4   RHEUMATOID FACTOR      <15 IU/mL  <10   SAPPHIRE SCREEN      Negative  Negative   C-REACTIVE PROTE Continue leflunomide 20 mg daily  - Blood work reviewed today and normal  - blood work every 3 mos     L lateral epicondyle pain  - He states that it is mild, advised he can use a elbow band if needed    Lumbar disc herniation  - s/p surgery in October 202

## 2022-04-06 RX ORDER — LEFLUNOMIDE 20 MG/1
TABLET ORAL
Qty: 90 TABLET | Refills: 0 | Status: SHIPPED | OUTPATIENT
Start: 2022-04-06

## 2022-04-20 ENCOUNTER — TELEPHONE (OUTPATIENT)
Dept: FAMILY MEDICINE CLINIC | Facility: CLINIC | Age: 62
End: 2022-04-20

## 2022-05-23 ENCOUNTER — TELEPHONE (OUTPATIENT)
Dept: FAMILY MEDICINE CLINIC | Facility: CLINIC | Age: 62
End: 2022-05-23

## 2022-06-08 LAB — AMB EXT COVID-19 RESULT: DETECTED

## 2022-06-10 ENCOUNTER — TELEPHONE (OUTPATIENT)
Dept: FAMILY MEDICINE CLINIC | Facility: CLINIC | Age: 62
End: 2022-06-10

## 2022-06-10 NOTE — TELEPHONE ENCOUNTER
Spoke to patient's spouse,  (on MARIA ANTONIA, verified patient's name and ). Patient tested positive for Covid on Wednesday and symptoms started Tuesday. He has headache, sore throat, congestion and cough. He is on Leflunomide. Patient scheduled for video visit today at 2:30 with Julia Churchill.  Patient advised to complete the e-check in in [employer], if active. Understands to follow the prompts and links to complete the visit. Patient advised that there may be a co-pay involved in this type of visit. Patient agreed to proceed, they understand the provider may be calling from a blocked, or unknown phone number on their caller ID and they know to answer the phone.     Best call back:  [phone]

## 2022-06-29 ENCOUNTER — TELEPHONE (OUTPATIENT)
Dept: FAMILY MEDICINE CLINIC | Facility: CLINIC | Age: 62
End: 2022-06-29

## 2022-07-09 DIAGNOSIS — Z51.81 ENCOUNTER FOR THERAPEUTIC DRUG MONITORING: ICD-10-CM

## 2022-07-09 DIAGNOSIS — M19.90 INFLAMMATORY ARTHRITIS: ICD-10-CM

## 2022-07-11 RX ORDER — LEFLUNOMIDE 20 MG/1
20 TABLET ORAL DAILY
Qty: 90 TABLET | Refills: 0 | Status: SHIPPED | OUTPATIENT
Start: 2022-07-11 | End: 2022-10-10

## 2022-09-28 ENCOUNTER — LAB ENCOUNTER (OUTPATIENT)
Dept: LAB | Age: 62
End: 2022-09-28
Attending: INTERNAL MEDICINE
Payer: MEDICARE

## 2022-09-28 DIAGNOSIS — Z51.81 ENCOUNTER FOR THERAPEUTIC DRUG MONITORING: ICD-10-CM

## 2022-09-28 DIAGNOSIS — M19.90 INFLAMMATORY ARTHRITIS: ICD-10-CM

## 2022-09-28 LAB
ALBUMIN SERPL-MCNC: 3.8 G/DL (ref 3.4–5)
ALT SERPL-CCNC: 38 U/L
AST SERPL-CCNC: 21 U/L (ref 15–37)
BASOPHILS # BLD AUTO: 0.03 X10(3) UL (ref 0–0.2)
BASOPHILS NFR BLD AUTO: 0.5 %
CREAT BLD-MCNC: 0.74 MG/DL
CRP SERPL-MCNC: 0.3 MG/DL (ref ?–0.3)
DEPRECATED RDW RBC AUTO: 42.1 FL (ref 35.1–46.3)
EOSINOPHIL # BLD AUTO: 0.13 X10(3) UL (ref 0–0.7)
EOSINOPHIL NFR BLD AUTO: 2.3 %
ERYTHROCYTE [DISTWIDTH] IN BLOOD BY AUTOMATED COUNT: 13.5 % (ref 11–15)
ERYTHROCYTE [SEDIMENTATION RATE] IN BLOOD: 8 MM/HR
GFR SERPLBLD BASED ON 1.73 SQ M-ARVRAT: 101 ML/MIN/1.73M2 (ref 60–?)
HCT VFR BLD AUTO: 45.8 %
HGB BLD-MCNC: 13.9 G/DL
IMM GRANULOCYTES # BLD AUTO: 0.02 X10(3) UL (ref 0–1)
IMM GRANULOCYTES NFR BLD: 0.4 %
LYMPHOCYTES # BLD AUTO: 1.05 X10(3) UL (ref 1–4)
LYMPHOCYTES NFR BLD AUTO: 18.9 %
MCH RBC QN AUTO: 25.8 PG (ref 26–34)
MCHC RBC AUTO-ENTMCNC: 30.3 G/DL (ref 31–37)
MCV RBC AUTO: 85 FL
MONOCYTES # BLD AUTO: 0.57 X10(3) UL (ref 0.1–1)
MONOCYTES NFR BLD AUTO: 10.3 %
NEUTROPHILS # BLD AUTO: 3.76 X10 (3) UL (ref 1.5–7.7)
NEUTROPHILS # BLD AUTO: 3.76 X10(3) UL (ref 1.5–7.7)
NEUTROPHILS NFR BLD AUTO: 67.6 %
PLATELET # BLD AUTO: 206 10(3)UL (ref 150–450)
RBC # BLD AUTO: 5.39 X10(6)UL
WBC # BLD AUTO: 5.6 X10(3) UL (ref 4–11)

## 2022-09-28 PROCEDURE — 36415 COLL VENOUS BLD VENIPUNCTURE: CPT

## 2022-09-28 PROCEDURE — 84450 TRANSFERASE (AST) (SGOT): CPT

## 2022-09-28 PROCEDURE — 85025 COMPLETE CBC W/AUTO DIFF WBC: CPT

## 2022-09-28 PROCEDURE — 82565 ASSAY OF CREATININE: CPT

## 2022-09-28 PROCEDURE — 82040 ASSAY OF SERUM ALBUMIN: CPT

## 2022-09-28 PROCEDURE — 84460 ALANINE AMINO (ALT) (SGPT): CPT

## 2022-09-28 PROCEDURE — 86140 C-REACTIVE PROTEIN: CPT

## 2022-09-28 PROCEDURE — 85652 RBC SED RATE AUTOMATED: CPT

## 2022-10-04 ENCOUNTER — PATIENT MESSAGE (OUTPATIENT)
Dept: RHEUMATOLOGY | Facility: CLINIC | Age: 62
End: 2022-10-04

## 2022-10-04 NOTE — TELEPHONE ENCOUNTER
From: Alyssa Faria  To:  Corazon Cordero MD  Sent: 10/4/2022 6:23 AM CDT  Subject: Office visit    Let me know if you want me to come in for office visit, thanks Eliel Davies

## 2022-10-10 ENCOUNTER — OFFICE VISIT (OUTPATIENT)
Dept: RHEUMATOLOGY | Facility: CLINIC | Age: 62
End: 2022-10-10
Payer: MEDICARE

## 2022-10-10 VITALS
WEIGHT: 195 LBS | BODY MASS INDEX: 31.34 KG/M2 | HEIGHT: 66 IN | HEART RATE: 80 BPM | DIASTOLIC BLOOD PRESSURE: 95 MMHG | SYSTOLIC BLOOD PRESSURE: 160 MMHG

## 2022-10-10 DIAGNOSIS — Z51.81 ENCOUNTER FOR THERAPEUTIC DRUG MONITORING: ICD-10-CM

## 2022-10-10 DIAGNOSIS — M19.90 INFLAMMATORY ARTHRITIS: ICD-10-CM

## 2022-10-10 PROCEDURE — 99214 OFFICE O/P EST MOD 30 MIN: CPT | Performed by: INTERNAL MEDICINE

## 2022-10-10 PROCEDURE — 3008F BODY MASS INDEX DOCD: CPT | Performed by: INTERNAL MEDICINE

## 2022-10-10 PROCEDURE — 3080F DIAST BP >= 90 MM HG: CPT | Performed by: INTERNAL MEDICINE

## 2022-10-10 PROCEDURE — 3077F SYST BP >= 140 MM HG: CPT | Performed by: INTERNAL MEDICINE

## 2022-10-10 RX ORDER — LEFLUNOMIDE 20 MG/1
20 TABLET ORAL DAILY
Qty: 90 TABLET | Refills: 0 | Status: SHIPPED | OUTPATIENT
Start: 2022-10-10

## 2022-10-11 ENCOUNTER — PATIENT MESSAGE (OUTPATIENT)
Dept: RHEUMATOLOGY | Facility: CLINIC | Age: 62
End: 2022-10-11

## 2022-10-11 DIAGNOSIS — Z51.81 ENCOUNTER FOR THERAPEUTIC DRUG MONITORING: ICD-10-CM

## 2022-10-11 DIAGNOSIS — M19.90 INFLAMMATORY ARTHRITIS: ICD-10-CM

## 2022-10-11 RX ORDER — LEFLUNOMIDE 20 MG/1
20 TABLET ORAL DAILY
Qty: 90 TABLET | Refills: 0 | Status: SHIPPED | OUTPATIENT
Start: 2022-10-11

## 2022-10-11 NOTE — TELEPHONE ENCOUNTER
From: Tyree Garcia  To:  Berkley Dunlap MD  Sent: 10/11/2022 6:54 AM CDT  Subject: Prescription     Sorry for the inconvenience, can you please send my leflunomide prescription to [facility] at [address], [place], [place], thanks Maris quan

## 2023-01-03 ENCOUNTER — E-VISIT (OUTPATIENT)
Dept: TELEHEALTH | Age: 63
End: 2023-01-03

## 2023-01-03 DIAGNOSIS — J40 BRONCHITIS: Primary | ICD-10-CM

## 2023-01-03 RX ORDER — BENZONATATE 200 MG/1
200 CAPSULE ORAL 3 TIMES DAILY PRN
Qty: 30 CAPSULE | Refills: 0 | Status: SHIPPED | OUTPATIENT
Start: 2023-01-03

## 2023-01-03 NOTE — PATIENT INSTRUCTIONS
If you are not improving in 7 days please be seen in person at one of our walk-in clinics or immediate cares  Please be seen immediately for new onset of fever, wheezing, or any worsening symptoms

## 2023-01-04 ENCOUNTER — TELEMEDICINE (OUTPATIENT)
Dept: INTERNAL MEDICINE CLINIC | Facility: CLINIC | Age: 63
End: 2023-01-04

## 2023-01-04 DIAGNOSIS — Z87.891 HISTORY OF SMOKING: ICD-10-CM

## 2023-01-04 DIAGNOSIS — R06.2 WHEEZING: ICD-10-CM

## 2023-01-04 DIAGNOSIS — R05.1 ACUTE COUGH: Primary | ICD-10-CM

## 2023-01-04 PROCEDURE — 99214 OFFICE O/P EST MOD 30 MIN: CPT | Performed by: INTERNAL MEDICINE

## 2023-01-04 RX ORDER — PREDNISONE 10 MG/1
5 TABLET ORAL 2 TIMES DAILY
Qty: 10 TABLET | Refills: 0 | Status: SHIPPED | OUTPATIENT
Start: 2023-01-04 | End: 2023-01-09

## 2023-01-04 RX ORDER — GUAIFENESIN AND CODEINE PHOSPHATE 100; 10 MG/5ML; MG/5ML
5 SOLUTION ORAL NIGHTLY PRN
Qty: 118 ML | Refills: 0 | Status: SHIPPED | OUTPATIENT
Start: 2023-01-04 | End: 2023-01-18

## 2023-01-04 RX ORDER — AZITHROMYCIN 250 MG/1
TABLET, FILM COATED ORAL
Qty: 6 TABLET | Refills: 0 | Status: SHIPPED | OUTPATIENT
Start: 2023-01-04 | End: 2023-01-09

## 2023-01-04 RX ORDER — EPINEPHRINE 0.3 MG/.3ML
0.3 INJECTION SUBCUTANEOUS ONCE AS NEEDED
Qty: 1 EACH | Refills: 0 | Status: SHIPPED | OUTPATIENT
Start: 2023-01-04 | End: 2023-01-04

## 2023-01-05 ENCOUNTER — HOSPITAL ENCOUNTER (OUTPATIENT)
Age: 63
Discharge: HOME | End: 2023-01-05
Payer: MEDICARE

## 2023-01-05 ENCOUNTER — NURSE TRIAGE (OUTPATIENT)
Dept: FAMILY MEDICINE CLINIC | Facility: CLINIC | Age: 63
End: 2023-01-05

## 2023-01-05 VITALS
RESPIRATION RATE: 20 BRPM | HEIGHT: 66 IN | OXYGEN SATURATION: 98 % | BODY MASS INDEX: 30.53 KG/M2 | SYSTOLIC BLOOD PRESSURE: 163 MMHG | WEIGHT: 190 LBS | HEART RATE: 100 BPM | TEMPERATURE: 97 F | DIASTOLIC BLOOD PRESSURE: 83 MMHG

## 2023-01-05 DIAGNOSIS — J06.9 VIRAL UPPER RESPIRATORY TRACT INFECTION: Primary | ICD-10-CM

## 2023-01-05 DIAGNOSIS — R03.0 ELEVATED BLOOD PRESSURE READING: ICD-10-CM

## 2023-01-05 PROCEDURE — 99213 OFFICE O/P EST LOW 20 MIN: CPT | Performed by: NURSE PRACTITIONER

## 2023-01-05 NOTE — ED INITIAL ASSESSMENT (HPI)
Pt has had a cough for about a week. Pt has had 2 televisits yesterday and the day before. Pt is on a zpack, benzonatate, codeine guaifenesine and prednisone. Pt cannot sleep because of the cough.

## 2023-01-05 NOTE — DISCHARGE INSTRUCTIONS
Finish the medication that you were prescribed through your telehealth visit. Take Tylenol as needed for any pain or discomfort recommend over-the-counter Flonase to help with any postnasal drip or congestion. Follow-up with your primary care provider for reevaluation and for elevated blood pressure reading. If you develop fever chest pain shortness of breath worsening cough vomiting diarrhea the worst headache of your life go to the nearest emergency department.

## 2023-01-09 RX ORDER — GUAIFENESIN AND CODEINE PHOSPHATE 100; 10 MG/5ML; MG/5ML
5 SOLUTION ORAL NIGHTLY PRN
Qty: 118 ML | Refills: 0 | OUTPATIENT
Start: 2023-01-09 | End: 2023-01-23

## 2023-01-10 ENCOUNTER — LAB ENCOUNTER (OUTPATIENT)
Dept: LAB | Age: 63
End: 2023-01-10
Attending: FAMILY MEDICINE
Payer: MEDICARE

## 2023-01-10 ENCOUNTER — OFFICE VISIT (OUTPATIENT)
Dept: FAMILY MEDICINE CLINIC | Facility: CLINIC | Age: 63
End: 2023-01-10
Payer: MEDICARE

## 2023-01-10 ENCOUNTER — HOSPITAL ENCOUNTER (OUTPATIENT)
Dept: GENERAL RADIOLOGY | Age: 63
Discharge: HOME | End: 2023-01-10
Attending: FAMILY MEDICINE
Payer: MEDICARE

## 2023-01-10 VITALS
BODY MASS INDEX: 33.43 KG/M2 | HEIGHT: 66 IN | DIASTOLIC BLOOD PRESSURE: 84 MMHG | SYSTOLIC BLOOD PRESSURE: 150 MMHG | HEART RATE: 82 BPM | WEIGHT: 208 LBS

## 2023-01-10 DIAGNOSIS — R73.03 PREDIABETES: ICD-10-CM

## 2023-01-10 DIAGNOSIS — R73.03 PREDIABETES: Primary | ICD-10-CM

## 2023-01-10 DIAGNOSIS — R06.02 SOB (SHORTNESS OF BREATH): ICD-10-CM

## 2023-01-10 DIAGNOSIS — F43.21 GRIEF REACTION: ICD-10-CM

## 2023-01-10 DIAGNOSIS — Z12.5 PROSTATE CANCER SCREENING: ICD-10-CM

## 2023-01-10 DIAGNOSIS — R05.8 OTHER COUGH: ICD-10-CM

## 2023-01-10 DIAGNOSIS — R97.20 ELEVATED PSA: ICD-10-CM

## 2023-01-10 DIAGNOSIS — L72.3 SEBACEOUS CYST: ICD-10-CM

## 2023-01-10 DIAGNOSIS — Z87.891 PERSONAL HISTORY OF TOBACCO USE, PRESENTING HAZARDS TO HEALTH: ICD-10-CM

## 2023-01-10 LAB
ALBUMIN SERPL-MCNC: 3.2 G/DL (ref 3.4–5)
ALBUMIN/GLOB SERPL: 1 {RATIO} (ref 1–2)
ALP LIVER SERPL-CCNC: 62 U/L
ALT SERPL-CCNC: 31 U/L
ANION GAP SERPL CALC-SCNC: 7 MMOL/L (ref 0–18)
AST SERPL-CCNC: 14 U/L (ref 15–37)
ATRIAL RATE: 75 BPM
BILIRUB SERPL-MCNC: 0.2 MG/DL (ref 0.1–2)
BUN BLD-MCNC: 19 MG/DL (ref 7–18)
BUN/CREAT SERPL: 23.2 (ref 10–20)
CALCIUM BLD-MCNC: 9.1 MG/DL (ref 8.5–10.1)
CHLORIDE SERPL-SCNC: 106 MMOL/L (ref 98–112)
CHOLEST SERPL-MCNC: 177 MG/DL (ref ?–200)
CO2 SERPL-SCNC: 28 MMOL/L (ref 21–32)
COMPLEXED PSA SERPL-MCNC: 4.6 NG/ML (ref ?–4)
CREAT BLD-MCNC: 0.82 MG/DL
EST. AVERAGE GLUCOSE BLD GHB EST-MCNC: 114 MG/DL (ref 68–126)
FASTING PATIENT LIPID ANSWER: YES
FASTING STATUS PATIENT QL REPORTED: YES
GFR SERPLBLD BASED ON 1.73 SQ M-ARVRAT: 98 ML/MIN/1.73M2 (ref 60–?)
GLOBULIN PLAS-MCNC: 3.1 G/DL (ref 2.8–4.4)
GLUCOSE BLD-MCNC: 114 MG/DL (ref 70–99)
HBA1C MFR BLD: 5.6 % (ref ?–5.7)
HDLC SERPL-MCNC: 44 MG/DL (ref 40–59)
LDLC SERPL CALC-MCNC: 124 MG/DL (ref ?–100)
NONHDLC SERPL-MCNC: 133 MG/DL (ref ?–130)
OSMOLALITY SERPL CALC.SUM OF ELEC: 295 MOSM/KG (ref 275–295)
P AXIS: 50 DEGREES
P-R INTERVAL: 120 MS
POTASSIUM SERPL-SCNC: 4.4 MMOL/L (ref 3.5–5.1)
PROT SERPL-MCNC: 6.3 G/DL (ref 6.4–8.2)
Q-T INTERVAL: 376 MS
QRS DURATION: 94 MS
QTC CALCULATION (BEZET): 419 MS
R AXIS: 33 DEGREES
SODIUM SERPL-SCNC: 141 MMOL/L (ref 136–145)
T AXIS: 29 DEGREES
TRIGL SERPL-MCNC: 46 MG/DL (ref 30–149)
TSI SER-ACNC: 1.75 MIU/ML (ref 0.36–3.74)
VENTRICULAR RATE: 75 BPM
VLDLC SERPL CALC-MCNC: 8 MG/DL (ref 0–30)

## 2023-01-10 PROCEDURE — 3079F DIAST BP 80-89 MM HG: CPT | Performed by: FAMILY MEDICINE

## 2023-01-10 PROCEDURE — 71046 X-RAY EXAM CHEST 2 VIEWS: CPT | Performed by: FAMILY MEDICINE

## 2023-01-10 PROCEDURE — 80061 LIPID PANEL: CPT

## 2023-01-10 PROCEDURE — 36415 COLL VENOUS BLD VENIPUNCTURE: CPT

## 2023-01-10 PROCEDURE — 93010 ELECTROCARDIOGRAM REPORT: CPT | Performed by: INTERNAL MEDICINE

## 2023-01-10 PROCEDURE — 80053 COMPREHEN METABOLIC PANEL: CPT

## 2023-01-10 PROCEDURE — 99214 OFFICE O/P EST MOD 30 MIN: CPT | Performed by: FAMILY MEDICINE

## 2023-01-10 PROCEDURE — 83036 HEMOGLOBIN GLYCOSYLATED A1C: CPT

## 2023-01-10 PROCEDURE — 3008F BODY MASS INDEX DOCD: CPT | Performed by: FAMILY MEDICINE

## 2023-01-10 PROCEDURE — 93005 ELECTROCARDIOGRAM TRACING: CPT

## 2023-01-10 PROCEDURE — 84443 ASSAY THYROID STIM HORMONE: CPT

## 2023-01-10 PROCEDURE — 3077F SYST BP >= 140 MM HG: CPT | Performed by: FAMILY MEDICINE

## 2023-01-10 RX ORDER — EPINEPHRINE 0.3 MG/.3ML
0.3 INJECTION SUBCUTANEOUS AS NEEDED
COMMUNITY
Start: 2023-01-04

## 2023-01-10 RX ORDER — PAROXETINE 10 MG/1
10 TABLET, FILM COATED ORAL EVERY MORNING
Qty: 30 TABLET | Refills: 2 | Status: SHIPPED | OUTPATIENT
Start: 2023-01-10

## 2023-01-10 RX ORDER — LOSARTAN POTASSIUM 25 MG/1
25 TABLET ORAL DAILY
Qty: 30 TABLET | Refills: 2 | Status: SHIPPED | OUTPATIENT
Start: 2023-01-10

## 2023-01-10 NOTE — PROGRESS NOTES
Blood pressure 150/84, pulse 82, height 5' 6\" (1.676 m), weight 208 lb (94.3 kg). Patient presents today following up for grief reaction. Sister  of lung cancer as he has been having crying spells. Denies suicidal ideation he does not drink alcohol. Denies chest pain or dyspnea on exertion. He does get panic attacks. Difficulty sleeping. Gets up at the same time every day. He is physically active. Quit smoking 3 years ago. Objective patient with appropriate mood and affect    Heart regular rate rhythm with no murmurs    Assessment #1 grief reaction #2 hypertension #3 large cyst noted mastoid area right side    4.   Smoking history 40 years    Plan #1 Paxil and referral for therapy #2 losartan and fasting blood test #3 referral to ENT #4 screening    Chest x-ray ordered for cough persisting after flu    Will follow with results

## 2023-01-15 ENCOUNTER — HOSPITAL ENCOUNTER (OUTPATIENT)
Dept: CT IMAGING | Age: 63
End: 2023-01-15
Attending: FAMILY MEDICINE
Payer: MEDICARE

## 2023-01-15 ENCOUNTER — HOSPITAL ENCOUNTER (OUTPATIENT)
Dept: CT IMAGING | Age: 63
Discharge: HOME | End: 2023-01-15
Attending: FAMILY MEDICINE
Payer: MEDICARE

## 2023-01-15 DIAGNOSIS — Z87.891 PERSONAL HISTORY OF TOBACCO USE, PRESENTING HAZARDS TO HEALTH: ICD-10-CM

## 2023-01-15 PROCEDURE — 71271 CT THORAX LUNG CANCER SCR C-: CPT | Performed by: FAMILY MEDICINE

## 2023-01-19 ENCOUNTER — TELEPHONE (OUTPATIENT)
Dept: FAMILY MEDICINE CLINIC | Facility: CLINIC | Age: 63
End: 2023-01-19

## 2023-02-16 ENCOUNTER — OFFICE VISIT (OUTPATIENT)
Dept: OTOLARYNGOLOGY | Facility: CLINIC | Age: 63
End: 2023-02-16

## 2023-02-16 DIAGNOSIS — L72.0 EPIDERMAL CYST OF NECK: Primary | ICD-10-CM

## 2023-02-16 PROCEDURE — 99203 OFFICE O/P NEW LOW 30 MIN: CPT | Performed by: OTOLARYNGOLOGY

## 2023-02-20 ENCOUNTER — TELEPHONE (OUTPATIENT)
Dept: OTOLARYNGOLOGY | Facility: CLINIC | Age: 63
End: 2023-02-20

## 2023-02-20 NOTE — TELEPHONE ENCOUNTER
Per pt's spouse was told pt would receive call to schedule surgery, but has not been called. Please call pt at [phone].

## 2023-02-21 ENCOUNTER — OFFICE VISIT (OUTPATIENT)
Dept: FAMILY MEDICINE CLINIC | Facility: CLINIC | Age: 63
End: 2023-02-21

## 2023-02-21 VITALS
WEIGHT: 200 LBS | HEART RATE: 74 BPM | BODY MASS INDEX: 32.14 KG/M2 | DIASTOLIC BLOOD PRESSURE: 68 MMHG | SYSTOLIC BLOOD PRESSURE: 124 MMHG | HEIGHT: 66 IN

## 2023-02-21 DIAGNOSIS — R97.20 ELEVATED PSA: ICD-10-CM

## 2023-02-21 DIAGNOSIS — E78.2 MIXED HYPERLIPIDEMIA: ICD-10-CM

## 2023-02-21 DIAGNOSIS — Z00.00 MEDICARE ANNUAL WELLNESS VISIT, INITIAL: Primary | ICD-10-CM

## 2023-02-21 DIAGNOSIS — M05.749 RHEUMATOID ARTHRITIS INVOLVING HAND WITH POSITIVE RHEUMATOID FACTOR, UNSPECIFIED LATERALITY (HCC): ICD-10-CM

## 2023-02-21 DIAGNOSIS — Z00.00 ENCOUNTER FOR ANNUAL HEALTH EXAMINATION: ICD-10-CM

## 2023-02-21 DIAGNOSIS — D46.4 RA (REFRACTORY ANEMIA) (HCC): ICD-10-CM

## 2023-02-21 DIAGNOSIS — I70.0 ATHEROSCLEROSIS OF AORTA (HCC): ICD-10-CM

## 2023-02-21 DIAGNOSIS — F17.200 TOBACCO DEPENDENCE: ICD-10-CM

## 2023-02-21 DIAGNOSIS — Z87.891 PERSONAL HISTORY OF TOBACCO USE, PRESENTING HAZARDS TO HEALTH: ICD-10-CM

## 2023-02-21 PROCEDURE — 96160 PT-FOCUSED HLTH RISK ASSMT: CPT | Performed by: FAMILY MEDICINE

## 2023-02-21 PROCEDURE — G0008 ADMIN INFLUENZA VIRUS VAC: HCPCS | Performed by: FAMILY MEDICINE

## 2023-02-21 PROCEDURE — 3008F BODY MASS INDEX DOCD: CPT | Performed by: FAMILY MEDICINE

## 2023-02-21 PROCEDURE — 90677 PCV20 VACCINE IM: CPT | Performed by: FAMILY MEDICINE

## 2023-02-21 PROCEDURE — G0439 PPPS, SUBSEQ VISIT: HCPCS | Performed by: FAMILY MEDICINE

## 2023-02-21 PROCEDURE — 90686 IIV4 VACC NO PRSV 0.5 ML IM: CPT | Performed by: FAMILY MEDICINE

## 2023-02-21 PROCEDURE — G0009 ADMIN PNEUMOCOCCAL VACCINE: HCPCS | Performed by: FAMILY MEDICINE

## 2023-02-21 PROCEDURE — 3078F DIAST BP <80 MM HG: CPT | Performed by: FAMILY MEDICINE

## 2023-02-21 PROCEDURE — 3074F SYST BP LT 130 MM HG: CPT | Performed by: FAMILY MEDICINE

## 2023-02-21 PROCEDURE — 99396 PREV VISIT EST AGE 40-64: CPT | Performed by: FAMILY MEDICINE

## 2023-02-21 RX ORDER — PAROXETINE HYDROCHLORIDE 20 MG/1
20 TABLET, FILM COATED ORAL EVERY MORNING
Qty: 90 TABLET | Refills: 1 | Status: SHIPPED | OUTPATIENT
Start: 2023-02-21

## 2023-02-21 RX ORDER — ROSUVASTATIN CALCIUM 10 MG/1
10 TABLET, COATED ORAL NIGHTLY
Qty: 90 TABLET | Refills: 1 | Status: SHIPPED | OUTPATIENT
Start: 2023-02-21 | End: 2023-02-21

## 2023-02-21 RX ORDER — PAROXETINE HYDROCHLORIDE 20 MG/1
20 TABLET, FILM COATED ORAL EVERY MORNING
Qty: 90 TABLET | Refills: 1 | Status: SHIPPED | OUTPATIENT
Start: 2023-02-21 | End: 2023-02-21

## 2023-02-21 RX ORDER — ROSUVASTATIN CALCIUM 10 MG/1
10 TABLET, COATED ORAL NIGHTLY
Qty: 90 TABLET | Refills: 1 | Status: SHIPPED | OUTPATIENT
Start: 2023-02-21

## 2023-02-26 ENCOUNTER — PATIENT MESSAGE (OUTPATIENT)
Dept: OTOLARYNGOLOGY | Facility: CLINIC | Age: 63
End: 2023-02-26

## 2023-02-27 NOTE — TELEPHONE ENCOUNTER
From: Leatha Moran  To: Michelle Dye. Clayton Tuttle MD  Sent: 2/26/2023 11:37 AM CST  Subject: Schedule Surgery    On my visit with you on Thursday Feb, 16. We decided to have my cyst removed and someone from your office would contact me for a day and time for the surgery. No one has contacted me. I have left two messages so we can schedule the removal of the cyst. Please advise how to move forward. Thank you. Johnathon Aguero [phone].

## 2023-02-28 ENCOUNTER — OFFICE VISIT (OUTPATIENT)
Dept: SURGERY | Facility: CLINIC | Age: 63
End: 2023-02-28

## 2023-02-28 VITALS — HEART RATE: 74 BPM | SYSTOLIC BLOOD PRESSURE: 124 MMHG | DIASTOLIC BLOOD PRESSURE: 68 MMHG

## 2023-02-28 DIAGNOSIS — R97.20 ELEVATED PSA: Primary | ICD-10-CM

## 2023-02-28 PROCEDURE — 99204 OFFICE O/P NEW MOD 45 MIN: CPT | Performed by: UROLOGY

## 2023-02-28 PROCEDURE — 3074F SYST BP LT 130 MM HG: CPT | Performed by: UROLOGY

## 2023-02-28 PROCEDURE — 3078F DIAST BP <80 MM HG: CPT | Performed by: UROLOGY

## 2023-03-15 ENCOUNTER — SURGERY CENTER DOCUMENTATION (OUTPATIENT)
Dept: SURGERY | Age: 63
End: 2023-03-15

## 2023-03-15 ENCOUNTER — LAB REQUISITION (OUTPATIENT)
Dept: SURGERY | Age: 63
End: 2023-03-15
Payer: MEDICARE

## 2023-03-15 ENCOUNTER — TELEPHONE (OUTPATIENT)
Dept: OTOLARYNGOLOGY | Facility: CLINIC | Age: 63
End: 2023-03-15

## 2023-03-15 DIAGNOSIS — L72.0 EPIDERMAL CYST OF NECK: Primary | ICD-10-CM

## 2023-03-15 DIAGNOSIS — L72.0 EPIDERMAL CYST: ICD-10-CM

## 2023-03-15 PROCEDURE — 88304 TISSUE EXAM BY PATHOLOGIST: CPT | Performed by: OTOLARYNGOLOGY

## 2023-03-15 PROCEDURE — 88305 TISSUE EXAM BY PATHOLOGIST: CPT | Performed by: OTOLARYNGOLOGY

## 2023-03-15 RX ORDER — CEPHALEXIN 500 MG/1
500 CAPSULE ORAL EVERY 8 HOURS
Qty: 21 CAPSULE | Refills: 0 | Status: SHIPPED | OUTPATIENT
Start: 2023-03-15

## 2023-03-15 RX ORDER — HYDROCODONE BITARTRATE AND ACETAMINOPHEN 5; 325 MG/1; MG/1
1 TABLET ORAL EVERY 6 HOURS PRN
Qty: 20 TABLET | Refills: 0 | Status: SHIPPED | OUTPATIENT
Start: 2023-03-15

## 2023-03-15 NOTE — PROCEDURES
Preoperative diagnosis: 2.5 cm right postauricular cyst    Postoperative diagnosis: Same    Surgery performed: Wide excision of 2.5 cm right postauricular cyst with intermediate layered closure measuring 5 cm    Surgeon: Shi Saldana MD    EBL: Less than 5 mL    Date of service: 3/15/2023     Anesthesia: 1% Xylocaine with 1-100,000 of epinephrine    Indications: Patient presents with a long history of a large cystic process behind the earlobe on the right. Wishes to have this removed. This appears to be a large cyst.  Recently having pain and discomfort. Procedure: Patient was taken to the operating room placed in supine position. The area in question was identified and infiltrated with 1% Xylocaine with 1/100,000 of epinephrine. Using a 15 blade the cyst was excised in a fusiform fashion creating a defect measuring roughly 2.25 cm x 5 cm in length. Any bleeding sites were controlled using bipolar cautery. The lesion was sent in formalin to pathology for further evaluation. The deep subcutaneous tissues were approximated and closed using a series of 4-0 PDS sutures. The skin edges were approximated everted and closed using a running locked 4-0 nylon suture. The wound was cleaned and a tape and benzoin dressing was placed. The patient was then taken to recovery room without any complications.   EBL less than 5 mL

## 2023-03-17 NOTE — TELEPHONE ENCOUNTER
Per pt incision is dry and intact, pt doing well. Pt taking antibiotic as prescribed. Advised pt to contact our office if symptoms change or worsen (pus or drainage from incision, fever greater than 101) pt verbalized understanding.

## 2023-03-23 ENCOUNTER — OFFICE VISIT (OUTPATIENT)
Dept: OTOLARYNGOLOGY | Facility: CLINIC | Age: 63
End: 2023-03-23

## 2023-03-23 DIAGNOSIS — L72.0 EPIDERMAL CYST OF NECK: Primary | ICD-10-CM

## 2023-03-23 PROCEDURE — 99024 POSTOP FOLLOW-UP VISIT: CPT | Performed by: OTOLARYNGOLOGY

## 2023-04-06 RX ORDER — LOSARTAN POTASSIUM 25 MG/1
25 TABLET ORAL DAILY
Qty: 90 TABLET | Refills: 3 | Status: SHIPPED | OUTPATIENT
Start: 2023-04-06

## 2023-04-06 NOTE — TELEPHONE ENCOUNTER
Refill passed per [place], [facility] protocol. Requested Prescriptions   Pending Prescriptions Disp Refills    losartan 25 MG Oral Tab [Pharmacy Med Name: LOSARTAN 25MG TABLETS] 90 tablet 3     Sig: Take 1 tablet (25 mg total) by mouth daily.        Hypertensive Medications Protocol Passed - 4/5/2023  2:21 PM        Passed - In person appointment in the past 12 or next 3 months     Recent Outpatient Visits              2 weeks ago Epidermal cyst of neck    [facility], [address], Natasha Jain MD    Office Visit    1 month ago Elevated PSA    [facility] Clark, Meño Antunez MD    Office Visit    1 month ago Alissa Kwok annual wellness visit, initial    Kike Clark [place] Je Vernon DO    Office Visit    1 month ago Epidermal cyst of neck    [facility], [address], Natasha Jain MD    Office Visit    2 months ago Prediabetes    [facility], Main [address], [place], Je Hernandes,     Office Visit          Future Appointments         Provider Department Appt Notes    In 4 days Karly Jones MD [facility], [address], [place] 6 week               Passed - Last BP reading less than 140/90     BP Readings from Last 1 Encounters:  02/28/23 : 124/68              Passed - CMP or BMP in past 6 months     Recent Results (from the past 4392 hour(s))   COMP METABOLIC PANEL (14)    Collection Time: 01/10/23  9:58 AM   Result Value Ref Range    Glucose 114 (H) 70 - 99 mg/dL    Sodium 141 136 - 145 mmol/L    Potassium 4.4 3.5 - 5.1 mmol/L    Chloride 106 98 - 112 mmol/L    CO2 28.0 21.0 - 32.0 mmol/L    Anion Gap 7 0 - 18 mmol/L    BUN 19 (H) 7 - 18 mg/dL    Creatinine 0.82 0.70 - 1.30 mg/dL    BUN/CREA Ratio 23.2 (H) 10.0 - 20.0    Calcium, Total 9.1 8.5 - 10.1 mg/dL    Calculated Osmolality 295 275 - 295 mOsm/kg    eGFR-Cr 98 >=60 mL/min/1.73m2    ALT 31 16 - 61 U/L    AST 14 (L) 15 - 37 U/L    Alkaline Phosphatase 62 45 - 117 U/L    Bilirubin, Total 0.2 0.1 - 2.0 mg/dL    Total Protein 6.3 (L) 6.4 - 8.2 g/dL    Albumin 3.2 (L) 3.4 - 5.0 g/dL    Globulin  3.1 2.8 - 4.4 g/dL    A/G Ratio 1.0 1.0 - 2.0    Patient Fasting for CMP? Yes      *Note: Due to a large number of results and/or encounters for the requested time period, some results have not been displayed. A complete set of results can be found in Results Review.                Passed - In person appointment or virtual visit in the past 6 months     Recent Outpatient Visits              2 weeks ago Epidermal cyst of neck    [address], [address] [facility],[address], Ludy Jain MD    Office Visit    1 month ago Elevated PSA    [address] [facility], De Dumont MD    Office Visit    1 month ago Alissa Kwok annual wellness visit, initial    [address], [address] [facility], [place], Leim Cox, DO    Office Visit    1 month ago Epidermal cyst of neck    [address], [address] [facility],[address], Ludy Jain MD    Office Visit    2 months ago Prediabetes    [facility], Main [address], [place], Leim Cox,     Office Visit          Future Appointments         Provider Department Appt Notes    In 4 days Marin Rosales MD [address] [facility], [place] 6 week               Passed - [facility] or GFRNAA > 50     GFR Evaluation  EGFRCR: 98 , resulted on 1/10/2023                Recent Outpatient Visits              2 weeks ago Epidermal cyst of neck    [address], [address] [facility],[address], Ludy Jain MD    Office Visit    1 month ago Elevated PSA    [address] [facility], De Dumont MD    Office Visit    1 month ago Alissa Kwok annual wellness visit, initial    [address], [address] [facility], [address] [facility], Willard Robertson,     Office Visit 1 month ago Epidermal cyst of neck    [facility], [address], Rdfa-Dymola-SpthojjDavid Ernandez MD    Office Visit    2 months ago Prediabetes    [address] [facility], [place], Shelba Ripa, DO    Office Visit            Future Appointments         Provider Department Appt Notes    In 4 days Beverley Durand MD [address], [address], [place] 6 week

## 2023-04-07 ENCOUNTER — LAB ENCOUNTER (OUTPATIENT)
Dept: LAB | Age: 63
End: 2023-04-07
Attending: INTERNAL MEDICINE
Payer: MEDICARE

## 2023-04-07 DIAGNOSIS — R97.20 ELEVATED PSA: ICD-10-CM

## 2023-04-07 LAB — PSA SERPL-MCNC: 2.84 NG/ML (ref ?–4)

## 2023-04-07 PROCEDURE — 84153 ASSAY OF PSA TOTAL: CPT

## 2023-04-07 PROCEDURE — 36415 COLL VENOUS BLD VENIPUNCTURE: CPT

## 2023-04-10 ENCOUNTER — TELEPHONE (OUTPATIENT)
Dept: SURGERY | Facility: CLINIC | Age: 63
End: 2023-04-10

## 2023-04-10 NOTE — TELEPHONE ENCOUNTER
- s/w pt; identity verified with name and   - per [facility] rescheduled today's 10:45am appt to  at 4pm.  - Encounter complete

## 2023-04-11 ENCOUNTER — PATIENT MESSAGE (OUTPATIENT)
Dept: RHEUMATOLOGY | Facility: CLINIC | Age: 63
End: 2023-04-11

## 2023-04-11 DIAGNOSIS — M19.90 INFLAMMATORY ARTHRITIS: ICD-10-CM

## 2023-04-11 DIAGNOSIS — Z51.81 ENCOUNTER FOR THERAPEUTIC DRUG MONITORING: ICD-10-CM

## 2023-04-11 DIAGNOSIS — M19.90 INFLAMMATORY ARTHROPATHY: Primary | ICD-10-CM

## 2023-04-11 RX ORDER — LEFLUNOMIDE 20 MG/1
20 TABLET ORAL DAILY
Qty: 90 TABLET | Refills: 0 | Status: SHIPPED | OUTPATIENT
Start: 2023-04-11

## 2023-04-11 NOTE — TELEPHONE ENCOUNTER
From: Petar Box  To: Yan Scott MD  Sent: 2023 9:37 AM CDT  Subject: Blood Test    I went to take a blood test last week and they informed me the standing order . Can you please send in an order?  I will schedule an appointment as soon as I take the blood test.   I am out of Leflunomide can you please send a prescription to [address] [address]  Thank you

## 2023-04-11 NOTE — TELEPHONE ENCOUNTER
Standing lab order generated. LOV: 10/10/2022  Future Appointments   Date Time Provider Regan Angeles   4/18/2023  4:00 PM Alfredo Hemphill MD [facility]     Labs:   Component      Latest Ref Rng 1/10/2023   Glucose      70 - 99 mg/dL 114 (H)    Sodium      136 - 145 mmol/L 141    Potassium      3.5 - 5.1 mmol/L 4.4    Chloride      98 - 112 mmol/L 106    Carbon Dioxide, Total      21.0 - 32.0 mmol/L 28.0    ANION GAP      0 - 18 mmol/L 7    BUN      7 - 18 mg/dL 19 (H)    CREATININE      0.70 - 1.30 mg/dL 0.82    BUN/CREATININE RATIO      10.0 - 20.0  23.2 (H)    CALCIUM      8.5 - 10.1 mg/dL 9.1    CALCULATED OSMOLALITY      275 - 295 mOsm/kg 295    eGFR-Cr      >=60 mL/min/1.73m2 98    ALT (SGPT)      16 - 61 U/L 31    AST (SGOT)      15 - 37 U/L 14 (L)    ALKALINE PHOSPHATASE      45 - 117 U/L 62    Total Bilirubin      0.1 - 2.0 mg/dL 0.2    PROTEIN, TOTAL      6.4 - 8.2 g/dL 6.3 (L)    Albumin      3.4 - 5.0 g/dL 3.2 (L)    Globulin      2.8 - 4.4 g/dL 3.1    A/G Ratio      1.0 - 2.0  1.0    Patient Fasting for CMP?  Yes       Component      Latest Ref Rng 9/28/2022   WBC      4.0 - 11.0 x10(3) uL 5.6    RBC      4.30 - 5.70 x10(6)uL 5.39    Hemoglobin      13.0 - 17.5 g/dL 13.9    Hematocrit      39.0 - 53.0 % 45.8    MCV      80.0 - 100.0 fL 85.0    MCH      26.0 - 34.0 pg 25.8 (L)    MCHC      31.0 - 37.0 g/dL 30.3 (L)    RDW-SD      35.1 - 46.3 fL 42.1    RDW      11.0 - 15.0 % 13.5    Platelet Count      530.3 - 450.0 10(3)uL 206.0    Prelim Neutrophil Abs      1.50 - 7.70 x10 (3) uL 3.76    Neutrophils Absolute      1.50 - 7.70 x10(3) uL 3.76    Lymphocytes Absolute      1.00 - 4.00 x10(3) uL 1.05    Monocytes Absolute      0.10 - 1.00 x10(3) uL 0.57    Eosinophils Absolute      0.00 - 0.70 x10(3) uL 0.13    Basophils Absolute      0.00 - 0.20 x10(3) uL 0.03    Immature Granulocyte Absolute      0.00 - 1.00 x10(3) uL 0.02    Neutrophils %      % 67.6    Lymphocytes %      % 18.9    Monocytes %      % 10.3    Eosinophils %      % 2.3    Basophils %      % 0.5    Immature Granulocyte %      % 0.4    CREATININE      0.70 - 1.30 mg/dL 0.74    eGFR-Cr      >=60 mL/min/1.73m2 101    Albumin      3.4 - 5.0 g/dL 3.8    AST (SGOT)      15 - 37 U/L 21    ALT (SGPT)      16 - 61 U/L 38    C-REACTIVE PROTEIN      <0.30 mg/dL 0.30 (H)    SED RATE      0 - 20 mm/Hr 8       Legend:  (L) Low  (H) High

## 2023-04-17 ENCOUNTER — LAB ENCOUNTER (OUTPATIENT)
Dept: LAB | Age: 63
End: 2023-04-17
Attending: INTERNAL MEDICINE
Payer: MEDICARE

## 2023-04-17 DIAGNOSIS — M19.90 INFLAMMATORY ARTHROPATHY: ICD-10-CM

## 2023-04-17 DIAGNOSIS — Z51.81 ENCOUNTER FOR THERAPEUTIC DRUG MONITORING: ICD-10-CM

## 2023-04-17 LAB
ALBUMIN SERPL-MCNC: 3.7 G/DL (ref 3.4–5)
ALT SERPL-CCNC: 25 U/L
AST SERPL-CCNC: 16 U/L (ref 15–37)
BASOPHILS # BLD AUTO: 0.03 X10(3) UL (ref 0–0.2)
BASOPHILS NFR BLD AUTO: 0.6 %
CREAT BLD-MCNC: 0.78 MG/DL
CRP SERPL-MCNC: 0.75 MG/DL (ref ?–0.3)
DEPRECATED RDW RBC AUTO: 42.9 FL (ref 35.1–46.3)
EOSINOPHIL # BLD AUTO: 0.09 X10(3) UL (ref 0–0.7)
EOSINOPHIL NFR BLD AUTO: 1.8 %
ERYTHROCYTE [DISTWIDTH] IN BLOOD BY AUTOMATED COUNT: 14 % (ref 11–15)
ERYTHROCYTE [SEDIMENTATION RATE] IN BLOOD: 6 MM/HR
GFR SERPLBLD BASED ON 1.73 SQ M-ARVRAT: 100 ML/MIN/1.73M2 (ref 60–?)
HCT VFR BLD AUTO: 42 %
HGB BLD-MCNC: 12.9 G/DL
IMM GRANULOCYTES # BLD AUTO: 0.01 X10(3) UL (ref 0–1)
IMM GRANULOCYTES NFR BLD: 0.2 %
LYMPHOCYTES # BLD AUTO: 1.23 X10(3) UL (ref 1–4)
LYMPHOCYTES NFR BLD AUTO: 24.5 %
MCH RBC QN AUTO: 25.7 PG (ref 26–34)
MCHC RBC AUTO-ENTMCNC: 30.7 G/DL (ref 31–37)
MCV RBC AUTO: 83.8 FL
MONOCYTES # BLD AUTO: 0.43 X10(3) UL (ref 0.1–1)
MONOCYTES NFR BLD AUTO: 8.5 %
NEUTROPHILS # BLD AUTO: 3.24 X10 (3) UL (ref 1.5–7.7)
NEUTROPHILS # BLD AUTO: 3.24 X10(3) UL (ref 1.5–7.7)
NEUTROPHILS NFR BLD AUTO: 64.4 %
PLATELET # BLD AUTO: 185 10(3)UL (ref 150–450)
RBC # BLD AUTO: 5.01 X10(6)UL
WBC # BLD AUTO: 5 X10(3) UL (ref 4–11)

## 2023-04-17 PROCEDURE — 85025 COMPLETE CBC W/AUTO DIFF WBC: CPT

## 2023-04-17 PROCEDURE — 82040 ASSAY OF SERUM ALBUMIN: CPT

## 2023-04-17 PROCEDURE — 84450 TRANSFERASE (AST) (SGOT): CPT

## 2023-04-17 PROCEDURE — 86140 C-REACTIVE PROTEIN: CPT

## 2023-04-17 PROCEDURE — 85652 RBC SED RATE AUTOMATED: CPT

## 2023-04-17 PROCEDURE — 82565 ASSAY OF CREATININE: CPT

## 2023-04-17 PROCEDURE — 36415 COLL VENOUS BLD VENIPUNCTURE: CPT

## 2023-04-17 PROCEDURE — 84460 ALANINE AMINO (ALT) (SGPT): CPT

## 2023-04-18 ENCOUNTER — OFFICE VISIT (OUTPATIENT)
Dept: SURGERY | Facility: CLINIC | Age: 63
End: 2023-04-18

## 2023-04-18 DIAGNOSIS — R97.20 ELEVATED PSA: Primary | ICD-10-CM

## 2023-04-18 PROCEDURE — 99213 OFFICE O/P EST LOW 20 MIN: CPT | Performed by: UROLOGY

## 2023-04-24 ENCOUNTER — OFFICE VISIT (OUTPATIENT)
Dept: PHYSICAL MEDICINE AND REHAB | Facility: CLINIC | Age: 63
End: 2023-04-24
Payer: MEDICARE

## 2023-04-24 VITALS
DIASTOLIC BLOOD PRESSURE: 80 MMHG | WEIGHT: 190 LBS | HEART RATE: 70 BPM | BODY MASS INDEX: 30.53 KG/M2 | SYSTOLIC BLOOD PRESSURE: 122 MMHG | HEIGHT: 66 IN

## 2023-04-24 DIAGNOSIS — S86.112A STRAIN OF GASTROCNEMIUS MUSCLE OF LEFT LOWER EXTREMITY, INITIAL ENCOUNTER: Primary | ICD-10-CM

## 2023-04-24 DIAGNOSIS — M54.16 LUMBAR RADICULOPATHY: ICD-10-CM

## 2023-04-24 DIAGNOSIS — M05.749 RHEUMATOID ARTHRITIS INVOLVING HAND WITH POSITIVE RHEUMATOID FACTOR, UNSPECIFIED LATERALITY (HCC): ICD-10-CM

## 2023-04-24 PROCEDURE — 3008F BODY MASS INDEX DOCD: CPT | Performed by: PHYSICAL MEDICINE & REHABILITATION

## 2023-04-24 PROCEDURE — 3079F DIAST BP 80-89 MM HG: CPT | Performed by: PHYSICAL MEDICINE & REHABILITATION

## 2023-04-24 PROCEDURE — 3074F SYST BP LT 130 MM HG: CPT | Performed by: PHYSICAL MEDICINE & REHABILITATION

## 2023-04-24 PROCEDURE — 99214 OFFICE O/P EST MOD 30 MIN: CPT | Performed by: PHYSICAL MEDICINE & REHABILITATION

## 2023-04-24 RX ORDER — PREDNISONE 10 MG/1
TABLET ORAL
Qty: 28 TABLET | Refills: 0 | Status: SHIPPED | OUTPATIENT
Start: 2023-04-24

## 2023-04-26 ENCOUNTER — OFFICE VISIT (OUTPATIENT)
Dept: RHEUMATOLOGY | Facility: CLINIC | Age: 63
End: 2023-04-26

## 2023-04-26 VITALS
DIASTOLIC BLOOD PRESSURE: 85 MMHG | BODY MASS INDEX: 30.53 KG/M2 | HEART RATE: 86 BPM | SYSTOLIC BLOOD PRESSURE: 133 MMHG | HEIGHT: 66 IN | WEIGHT: 190 LBS

## 2023-04-26 DIAGNOSIS — M19.90 INFLAMMATORY ARTHROPATHY: Primary | ICD-10-CM

## 2023-04-26 DIAGNOSIS — Z51.81 ENCOUNTER FOR THERAPEUTIC DRUG MONITORING: ICD-10-CM

## 2023-04-26 PROCEDURE — 3075F SYST BP GE 130 - 139MM HG: CPT | Performed by: INTERNAL MEDICINE

## 2023-04-26 PROCEDURE — 99214 OFFICE O/P EST MOD 30 MIN: CPT | Performed by: INTERNAL MEDICINE

## 2023-04-26 PROCEDURE — 3079F DIAST BP 80-89 MM HG: CPT | Performed by: INTERNAL MEDICINE

## 2023-04-26 PROCEDURE — 3008F BODY MASS INDEX DOCD: CPT | Performed by: INTERNAL MEDICINE

## 2023-06-29 ENCOUNTER — LAB ENCOUNTER (OUTPATIENT)
Dept: LAB | Age: 63
End: 2023-06-29
Attending: FAMILY MEDICINE
Payer: MEDICARE

## 2023-06-29 DIAGNOSIS — E78.2 MIXED HYPERLIPIDEMIA: ICD-10-CM

## 2023-06-29 LAB
ALBUMIN SERPL-MCNC: 3.8 G/DL (ref 3.4–5)
ALBUMIN/GLOB SERPL: 1.7 {RATIO} (ref 1–2)
ALP LIVER SERPL-CCNC: 53 U/L
ALT SERPL-CCNC: 36 U/L
ANION GAP SERPL CALC-SCNC: 3 MMOL/L (ref 0–18)
AST SERPL-CCNC: 23 U/L (ref 15–37)
BILIRUB SERPL-MCNC: 0.4 MG/DL (ref 0.1–2)
BUN BLD-MCNC: 23 MG/DL (ref 7–18)
CALCIUM BLD-MCNC: 8.7 MG/DL (ref 8.5–10.1)
CHLORIDE SERPL-SCNC: 111 MMOL/L (ref 98–112)
CHOLEST SERPL-MCNC: 209 MG/DL (ref ?–200)
CO2 SERPL-SCNC: 25 MMOL/L (ref 21–32)
CREAT BLD-MCNC: 0.75 MG/DL
EST. AVERAGE GLUCOSE BLD GHB EST-MCNC: 114 MG/DL (ref 68–126)
FASTING PATIENT LIPID ANSWER: YES
FASTING STATUS PATIENT QL REPORTED: YES
GFR SERPLBLD BASED ON 1.73 SQ M-ARVRAT: 100 ML/MIN/1.73M2 (ref 60–?)
GLOBULIN PLAS-MCNC: 2.3 G/DL (ref 2.8–4.4)
GLUCOSE BLD-MCNC: 97 MG/DL (ref 70–99)
HBA1C MFR BLD: 5.6 % (ref ?–5.7)
HDLC SERPL-MCNC: 59 MG/DL (ref 40–59)
LDLC SERPL CALC-MCNC: 144 MG/DL (ref ?–100)
NONHDLC SERPL-MCNC: 150 MG/DL (ref ?–130)
OSMOLALITY SERPL CALC.SUM OF ELEC: 292 MOSM/KG (ref 275–295)
POTASSIUM SERPL-SCNC: 4.4 MMOL/L (ref 3.5–5.1)
PROT SERPL-MCNC: 6.1 G/DL (ref 6.4–8.2)
SODIUM SERPL-SCNC: 139 MMOL/L (ref 136–145)
TRIGL SERPL-MCNC: 35 MG/DL (ref 30–149)
VLDLC SERPL CALC-MCNC: 6 MG/DL (ref 0–30)

## 2023-06-29 PROCEDURE — 80061 LIPID PANEL: CPT

## 2023-06-29 PROCEDURE — 36415 COLL VENOUS BLD VENIPUNCTURE: CPT

## 2023-06-29 PROCEDURE — 80053 COMPREHEN METABOLIC PANEL: CPT

## 2023-06-29 PROCEDURE — 83036 HEMOGLOBIN GLYCOSYLATED A1C: CPT

## 2023-08-14 ENCOUNTER — TELEPHONE (OUTPATIENT)
Dept: CASE MANAGEMENT | Age: 63
End: 2023-08-14

## 2023-08-14 NOTE — TELEPHONE ENCOUNTER
I have not seen him since April. I do not routinely order MRIs for other doctors. If he would like advice regarding his back trouble, he would need to see me for follow-up.

## 2023-08-14 NOTE — TELEPHONE ENCOUNTER
Spoke with patient states the he's requesting an MRI Lumbar prior to seeing Dr. Harry Barbosa.    No appointment with Dr. Harry Barbosa scheduled until patient has an updated MRI. Please advise.

## 2023-08-14 NOTE — TELEPHONE ENCOUNTER
Dr. Froylan Stockton,     Patient is requesting an MRI. Please call patient for additional information. Thank you.

## 2023-08-16 ENCOUNTER — TELEPHONE (OUTPATIENT)
Dept: PHYSICAL MEDICINE AND REHAB | Facility: CLINIC | Age: 63
End: 2023-08-16

## 2023-08-19 RX ORDER — PAROXETINE HYDROCHLORIDE 20 MG/1
20 TABLET, FILM COATED ORAL EVERY MORNING
Qty: 90 TABLET | Refills: 3 | Status: SHIPPED | OUTPATIENT
Start: 2023-08-19

## 2023-08-19 RX ORDER — ROSUVASTATIN CALCIUM 10 MG/1
10 TABLET, COATED ORAL NIGHTLY
Qty: 90 TABLET | Refills: 1 | Status: SHIPPED | OUTPATIENT
Start: 2023-08-19

## 2023-08-19 NOTE — TELEPHONE ENCOUNTER
Please review. Protocol Failed or has no Protocol    Requested Prescriptions   Pending Prescriptions Disp Refills    rosuvastatin 10 MG Oral Tab [Pharmacy Med Name: ROSUVASTATIN 10MG TABLETS] 90 tablet 1     Sig: Take 1 tablet (10 mg total) by mouth nightly. Cholesterol Medication Protocol Failed - 8/19/2023  5:53 AM        Failed - Last LDL < 130     Lab Results   Component Value Date     (H) 06/29/2023             Passed - ALT in past 12 months        Passed - LDL in past 12 months        Passed - Last ALT < 80     Lab Results   Component Value Date    ALT 36 06/29/2023             Passed - In person appointment or virtual visit in the past 12 mos or appointment in next 3 mos     Recent Outpatient Visits              3 months ago Inflammatory arthropathy    Governor MD Stephen    Office Visit    3 months ago Strain of gastrocnemius muscle of left lower extremity, initial encounter    [address], Herbert Sheikh MD    Office Visit    4 months ago Elevated PSA    [address], Debby Sotelo MD    Office Visit    4 months ago Epidermal cyst of neck    [address] [facility], [address], [facility], Jose Singh MD    Office Visit    5 months ago Elevated PSA    [address], Debby Sotelo MD    Office Visit          Future Appointments         Provider Department Appt Notes    In 1 week Shad Chow MD [address] [place], [place] Follow up                Signed Prescriptions Disp Refills    PARoxetine 20 MG Oral Tab 90 tablet 3     Sig: Take 1 tablet (20 mg total) by mouth every morning.        Psychiatric Non-Scheduled (Anti-Anxiety) Passed - 8/19/2023  9:58 AM        Passed - In person appointment or virtual visit in the past 6 mos or appointment in next 3 mos     Recent Outpatient Visits 3 months ago Inflammatory arthropathy    Reinaldo Thompson, Vicki Cuellar MD    Office Visit    3 months ago Strain of gastrocnemius muscle of left lower extremity, initial encounter    [facility], [address], Anders Rene MD    Office Visit    4 months ago Elevated PSA    Airam Walker MD    Office Visit    4 months ago Epidermal cyst of neck    [facility], [address] [facility],[address], [facility], Hien Padilla MD    Office Visit    5 months ago Elevated PSA    Allyne Keys, Dorma Closs, MD    Office Visit          Future Appointments         Provider Department Appt Notes    In 1 week MD Ernestine Pritchard [place] Follow up

## 2023-08-19 NOTE — TELEPHONE ENCOUNTER
Refill pass per protocol    Requested Prescriptions   Pending Prescriptions Disp Refills    PAROXETINE 20 MG Oral Tab [Pharmacy Med Name: PAROXETINE 20MG TABLETS] 90 tablet 1     Sig: TAKE 1 TABLET(20 MG) BY MOUTH EVERY MORNING       Psychiatric Non-Scheduled (Anti-Anxiety) Passed - 2023  9:58 AM        Passed - In person appointment or virtual visit in the past 6 mos or appointment in next 3 mos     Recent Outpatient Visits              3 months ago Inflammatory arthropathy    Miriam Chow MD    Office Visit    3 months ago Strain of gastrocnemius muscle of left lower extremity, initial encounter    Wing Schrader, [address], John Rodriges MD    Office Visit    4 months ago Elevated PSA    [facility], [address], Chloe Barraza MD    Office Visit    4 months ago Epidermal cyst of neck    [facility], [address], Сергей Jain MD    Office Visit    5 months ago Elevated PSA    Neftaly Shah, Chloe Barraza MD    Office Visit          Future Appointments         Provider Department Appt Notes    In 1 week MD Neftaly Koehler, [place] Follow up                 ROSUVASTATIN 10 MG Oral Tab [Pharmacy Med Name: ROSUVASTATIN 10MG TABLETS] 90 tablet 1     Sig: TAKE 1 TABLET(10 MG) BY MOUTH EVERY NIGHT       Cholesterol Medication Protocol Failed - 2023  5:53 AM        Failed - Last LDL < 130     Lab Results   Component Value Date     (H) 2023             Passed - ALT in past 12 months        Passed - LDL in past 12 months        Passed - Last ALT < 80     Lab Results   Component Value Date    ALT 36 2023             Passed - In person appointment or virtual visit in the past 12 mos or appointment in next 3 mos     Recent Outpatient Visits              3 months ago Inflammatory arthropathy    An [facility], [address], Ivana Bae MD    Office Visit    3 months ago Strain of gastrocnemius muscle of left lower extremity, initial encounter    Anders Liriano MD    Office Visit    4 months ago Elevated PSA    Tracie Ruano MD    Office Visit    4 months ago Epidermal cyst of neck    An [facility]dez, [address], Carey Jain MD    Office Visit    5 months ago Elevated PSA    Nereida Wise, Jayna Saenz MD    Office Visit          Future Appointments         Provider Department Appt Notes    In 1 week MD Nereida Vick [place] Follow up

## 2023-08-21 ENCOUNTER — PATIENT MESSAGE (OUTPATIENT)
Dept: FAMILY MEDICINE CLINIC | Facility: CLINIC | Age: 63
End: 2023-08-21

## 2023-08-22 ENCOUNTER — TELEPHONE (OUTPATIENT)
Dept: CASE MANAGEMENT | Age: 63
End: 2023-08-22

## 2023-08-22 DIAGNOSIS — M50.20 HERNIATED CERVICAL DISC: Primary | ICD-10-CM

## 2023-08-22 DIAGNOSIS — M54.9 BACK PAIN, UNSPECIFIED BACK LOCATION, UNSPECIFIED BACK PAIN LATERALITY, UNSPECIFIED CHRONICITY: ICD-10-CM

## 2023-08-22 NOTE — TELEPHONE ENCOUNTER
Dr. Chaparrita Pulido,     The patient is requesting a referral to see Dr. Jessica Franco for back pain. Pended referral please review diagnosis and sign off if you agree. Thank you.   Mohit Kellogg

## 2023-08-22 NOTE — TELEPHONE ENCOUNTER
From: Vandana Lino  To: Vaughn Moser. Gino Vernon DO  Sent: 8/21/2023 6:52 PM CDT  Subject: Referral for Dr Calvin Maravilla    Need a referral for Dr. Calvin Maravilla for a n appointment on Monday.

## 2023-08-23 ENCOUNTER — OFFICE VISIT (OUTPATIENT)
Dept: PHYSICAL MEDICINE AND REHAB | Facility: CLINIC | Age: 63
End: 2023-08-23
Payer: MEDICARE

## 2023-08-23 ENCOUNTER — HOSPITAL ENCOUNTER (OUTPATIENT)
Dept: GENERAL RADIOLOGY | Facility: HOSPITAL | Age: 63
Discharge: HOME | End: 2023-08-23
Attending: PHYSICAL MEDICINE & REHABILITATION
Payer: MEDICARE

## 2023-08-23 VITALS — BODY MASS INDEX: 32.14 KG/M2 | WEIGHT: 200 LBS | HEIGHT: 66 IN

## 2023-08-23 DIAGNOSIS — M05.749 RHEUMATOID ARTHRITIS INVOLVING HAND WITH POSITIVE RHEUMATOID FACTOR, UNSPECIFIED LATERALITY (HCC): ICD-10-CM

## 2023-08-23 DIAGNOSIS — M54.16 LUMBAR RADICULOPATHY: Primary | ICD-10-CM

## 2023-08-23 DIAGNOSIS — S86.112A STRAIN OF GASTROCNEMIUS MUSCLE OF LEFT LOWER EXTREMITY, INITIAL ENCOUNTER: ICD-10-CM

## 2023-08-23 DIAGNOSIS — M54.16 LUMBAR RADICULOPATHY: ICD-10-CM

## 2023-08-23 PROCEDURE — 72100 X-RAY EXAM L-S SPINE 2/3 VWS: CPT | Performed by: PHYSICAL MEDICINE & REHABILITATION

## 2023-08-23 PROCEDURE — 3008F BODY MASS INDEX DOCD: CPT | Performed by: PHYSICAL MEDICINE & REHABILITATION

## 2023-08-23 PROCEDURE — 99214 OFFICE O/P EST MOD 30 MIN: CPT | Performed by: PHYSICAL MEDICINE & REHABILITATION

## 2023-08-23 RX ORDER — MELOXICAM 15 MG/1
15 TABLET ORAL DAILY
Qty: 30 TABLET | Refills: 0 | Status: SHIPPED | OUTPATIENT
Start: 2023-08-23

## 2023-08-23 RX ORDER — PREDNISONE 10 MG/1
TABLET ORAL
Qty: 28 TABLET | Refills: 0 | Status: SHIPPED | OUTPATIENT
Start: 2023-08-23

## 2023-08-23 RX ORDER — CYCLOBENZAPRINE HCL 10 MG
10 TABLET ORAL NIGHTLY
Qty: 30 TABLET | Refills: 0 | Status: SHIPPED | OUTPATIENT
Start: 2023-08-23 | End: 2023-09-22

## 2023-08-25 ENCOUNTER — PATIENT MESSAGE (OUTPATIENT)
Dept: PHYSICAL MEDICINE AND REHAB | Facility: CLINIC | Age: 63
End: 2023-08-25

## 2023-08-28 RX ORDER — DIAZEPAM 10 MG
TABLET ORAL
Qty: 3 TABLET | Refills: 0 | Status: CANCELLED | OUTPATIENT
Start: 2023-08-28

## 2023-08-28 RX ORDER — DIAZEPAM 5 MG/1
TABLET ORAL
Qty: 3 TABLET | Refills: 0 | Status: SHIPPED | OUTPATIENT
Start: 2023-08-28

## 2023-08-28 NOTE — TELEPHONE ENCOUNTER
From: Uma Aquino I  To: Issa Carrillo  Sent: 8/25/2023 11:01 AM CDT  Subject: Hosie Midget Results    Dev Torres has reviewed your xray and states     \"Please let the patient know that his x-ray shows a fair amount of arthritis, continue present plan. \"    Please let us know if you have any further questions.      Thank you,  Kalie Palma MA

## 2023-09-01 ENCOUNTER — HOSPITAL ENCOUNTER (OUTPATIENT)
Dept: MRI IMAGING | Age: 63
Discharge: HOME | End: 2023-09-01
Attending: PHYSICAL MEDICINE & REHABILITATION
Payer: MEDICARE

## 2023-09-01 DIAGNOSIS — M54.16 LUMBAR RADICULOPATHY: ICD-10-CM

## 2023-09-01 PROCEDURE — 72148 MRI LUMBAR SPINE W/O DYE: CPT | Performed by: PHYSICAL MEDICINE & REHABILITATION

## 2023-09-07 ENCOUNTER — TELEPHONE (OUTPATIENT)
Dept: CASE MANAGEMENT | Age: 63
End: 2023-09-07

## 2023-09-07 DIAGNOSIS — M54.16 LUMBAR RADICULOPATHY: Primary | ICD-10-CM

## 2023-09-07 NOTE — TELEPHONE ENCOUNTER
Dr. Ayad Blanco,     The patient is at Dr. Moses sutton [facility] a referral for an appointment today 9/7/23. Pended referral please review diagnosis and sign off if you agree. Thank you.   Mohit Kellogg

## 2023-09-11 ENCOUNTER — PATIENT MESSAGE (OUTPATIENT)
Dept: SURGERY | Facility: CLINIC | Age: 63
End: 2023-09-11

## 2023-09-11 ENCOUNTER — TELEPHONE (OUTPATIENT)
Dept: CASE MANAGEMENT | Age: 63
End: 2023-09-11

## 2023-09-11 DIAGNOSIS — M54.16 LUMBAR RADICULOPATHY: Primary | ICD-10-CM

## 2023-09-11 DIAGNOSIS — R97.20 ELEVATED PSA: Primary | ICD-10-CM

## 2023-09-12 ENCOUNTER — TELEPHONE (OUTPATIENT)
Dept: FAMILY MEDICINE CLINIC | Facility: CLINIC | Age: 63
End: 2023-09-12

## 2023-09-12 NOTE — TELEPHONE ENCOUNTER
Patient is a new grandparent. Spouse requesting a Tdap vaccine entered from Dr Graciela Tyler.     Call patient [phone] a voice message also let her know where to call to schedule, also needs closest facility to get done near their home, if possible

## 2023-09-12 NOTE — TELEPHONE ENCOUNTER
See request below    Please advise if patient eligible for TDAP vaccine.     No history of Tdap vaccine on file

## 2023-09-14 ENCOUNTER — OFFICE VISIT (OUTPATIENT)
Dept: SURGERY | Facility: CLINIC | Age: 63
End: 2023-09-14

## 2023-09-14 ENCOUNTER — TELEPHONE (OUTPATIENT)
Dept: FAMILY MEDICINE CLINIC | Facility: CLINIC | Age: 63
End: 2023-09-14

## 2023-09-14 DIAGNOSIS — R97.20 ELEVATED PSA: ICD-10-CM

## 2023-09-14 DIAGNOSIS — N40.1 BPH WITH OBSTRUCTION/LOWER URINARY TRACT SYMPTOMS: Primary | ICD-10-CM

## 2023-09-14 DIAGNOSIS — N13.8 BPH WITH OBSTRUCTION/LOWER URINARY TRACT SYMPTOMS: Primary | ICD-10-CM

## 2023-09-14 PROCEDURE — 99214 OFFICE O/P EST MOD 30 MIN: CPT | Performed by: UROLOGY

## 2023-09-14 RX ORDER — TAMSULOSIN HYDROCHLORIDE 0.4 MG/1
0.4 CAPSULE ORAL DAILY
Qty: 90 CAPSULE | Refills: 3 | Status: SHIPPED | OUTPATIENT
Start: 2023-09-14 | End: 2024-09-08

## 2023-09-18 ENCOUNTER — EKG ENCOUNTER (OUTPATIENT)
Dept: LAB | Age: 63
End: 2023-09-18
Attending: FAMILY MEDICINE
Payer: MEDICARE

## 2023-09-18 ENCOUNTER — LAB ENCOUNTER (OUTPATIENT)
Dept: LAB | Age: 63
End: 2023-09-18
Attending: FAMILY MEDICINE
Payer: MEDICARE

## 2023-09-18 ENCOUNTER — HOSPITAL ENCOUNTER (OUTPATIENT)
Dept: GENERAL RADIOLOGY | Age: 63
Discharge: HOME | End: 2023-09-18
Payer: MEDICARE

## 2023-09-18 DIAGNOSIS — Z01.818 PREOP EXAMINATION: ICD-10-CM

## 2023-09-18 DIAGNOSIS — N13.8 BPH WITH OBSTRUCTION/LOWER URINARY TRACT SYMPTOMS: ICD-10-CM

## 2023-09-18 DIAGNOSIS — N40.1 BPH WITH OBSTRUCTION/LOWER URINARY TRACT SYMPTOMS: ICD-10-CM

## 2023-09-18 DIAGNOSIS — Z00.00 MEDICARE ANNUAL WELLNESS VISIT, INITIAL: ICD-10-CM

## 2023-09-18 LAB
ATRIAL RATE: 82 BPM
BILIRUB UR QL STRIP.AUTO: NEGATIVE
CLARITY UR REFRACT.AUTO: CLEAR
GLUCOSE UR STRIP.AUTO-MCNC: NORMAL MG/DL
KETONES UR STRIP.AUTO-MCNC: NEGATIVE MG/DL
LEUKOCYTE ESTERASE UR QL STRIP.AUTO: NEGATIVE
NITRITE UR QL STRIP.AUTO: NEGATIVE
P AXIS: 49 DEGREES
P-R INTERVAL: 122 MS
PH UR STRIP.AUTO: 5.5 [PH] (ref 5–8)
PROT UR STRIP.AUTO-MCNC: 20 MG/DL
Q-T INTERVAL: 362 MS
QRS DURATION: 92 MS
QTC CALCULATION (BEZET): 422 MS
R AXIS: 44 DEGREES
RBC UR QL AUTO: NEGATIVE
SP GR UR STRIP.AUTO: 1.02 (ref 1–1.03)
T AXIS: 41 DEGREES
UROBILINOGEN UR STRIP.AUTO-MCNC: NORMAL MG/DL
VENTRICULAR RATE: 82 BPM

## 2023-09-18 PROCEDURE — 81001 URINALYSIS AUTO W/SCOPE: CPT

## 2023-09-18 PROCEDURE — 93005 ELECTROCARDIOGRAM TRACING: CPT

## 2023-09-18 PROCEDURE — 71046 X-RAY EXAM CHEST 2 VIEWS: CPT

## 2023-09-18 PROCEDURE — 93010 ELECTROCARDIOGRAM REPORT: CPT | Performed by: INTERNAL MEDICINE

## 2023-09-18 RX ORDER — MELOXICAM 15 MG/1
15 TABLET ORAL DAILY
Qty: 30 TABLET | Refills: 0 | OUTPATIENT
Start: 2023-09-18

## 2023-09-26 NOTE — TELEPHONE ENCOUNTER
From: Rosita Robles  To: Augusto Carlson MD  Sent: 9/11/2023 10:17 AM CDT  Subject: Blood Test or Urine     I have an appointment on Thursday concerning my difficulty in urinating. Should I have a blood test or urine test prior to appointment?

## 2023-09-26 NOTE — TELEPHONE ENCOUNTER
Pt saw RICK on 9/14, see below for Plan:    Assessment  Elevated psa  Bph with obstruction/lower urinary tract symptoms  (primary encounter diagnosis)     Recommend:  - We will start him empirically on tamsulosin 0.4 mg daily. Side effects reviewed. - Return to clinic in 6 weeks to assess response prior to his scheduled spine surgery. Uroflow and bladder scan at next visit. - Urinalysis with microscopy as he states he is having labs done next week Monday.

## 2023-10-06 ENCOUNTER — OFFICE VISIT (OUTPATIENT)
Dept: FAMILY MEDICINE CLINIC | Facility: CLINIC | Age: 63
End: 2023-10-06

## 2023-10-06 VITALS
HEIGHT: 66 IN | HEART RATE: 89 BPM | BODY MASS INDEX: 31.82 KG/M2 | WEIGHT: 198 LBS | DIASTOLIC BLOOD PRESSURE: 86 MMHG | SYSTOLIC BLOOD PRESSURE: 137 MMHG

## 2023-10-06 DIAGNOSIS — M51.26 LUMBAR DISC HERNIATION: Primary | ICD-10-CM

## 2023-10-06 DIAGNOSIS — E78.2 MIXED HYPERLIPIDEMIA: ICD-10-CM

## 2023-10-06 DIAGNOSIS — R73.03 PREDIABETES: ICD-10-CM

## 2023-10-06 DIAGNOSIS — M05.749 RHEUMATOID ARTHRITIS INVOLVING HAND WITH POSITIVE RHEUMATOID FACTOR, UNSPECIFIED LATERALITY (HCC): ICD-10-CM

## 2023-10-06 PROCEDURE — 99214 OFFICE O/P EST MOD 30 MIN: CPT | Performed by: FAMILY MEDICINE

## 2023-10-06 PROCEDURE — 3075F SYST BP GE 130 - 139MM HG: CPT | Performed by: FAMILY MEDICINE

## 2023-10-06 PROCEDURE — 3008F BODY MASS INDEX DOCD: CPT | Performed by: FAMILY MEDICINE

## 2023-10-06 PROCEDURE — 3079F DIAST BP 80-89 MM HG: CPT | Performed by: FAMILY MEDICINE

## 2023-10-06 NOTE — H&P (VIEW-ONLY)
Blood pressure 137/86, pulse 89, height 5' 6\" (1.676 m), weight 198 lb (89.8 kg). Preoperative physical for lumbar fusion scheduled November 8. Patient is aware that it is more than 30 days prior to the operation. No history of problems with anesthesia or with bleeding or clotting postoperatively. No medication allergies. Medication list reviewed. Patient denies any fever, chills, vomiting, diarrhea, coughing, nasal congestion, sore throat, rashes, burning with urination.     Objective    Throat clear not erythematous    Heart regular rate rhythm no murmurs    Lungs clear to auscultation no rales rhonchi or wheezes    Abdomen soft nontender nondistended    L4-S1 motor intact bilaterally    Feet with good pulses and intact skin    Assessment #1 lumbar discogenic disease with radiculopathy #2 rheumatoid arthritis #3 prediabetes #4 hyperlipidemia #5 hypertension    Plan #1 encouraged patient to wait till within 30 days of operation for blood test urine and MRSA testing #2 holding leflunomide as per rheumatology order #3 blood test ordered #4 rosuvastatin #5 losartan

## 2023-10-09 ENCOUNTER — LAB ENCOUNTER (OUTPATIENT)
Dept: LAB | Age: 63
End: 2023-10-09
Attending: FAMILY MEDICINE
Payer: MEDICARE

## 2023-10-09 DIAGNOSIS — M51.26 LUMBAR DISC HERNIATION: ICD-10-CM

## 2023-10-09 DIAGNOSIS — R73.03 PREDIABETES: ICD-10-CM

## 2023-10-09 DIAGNOSIS — M19.90 INFLAMMATORY ARTHROPATHY: ICD-10-CM

## 2023-10-09 DIAGNOSIS — Z51.81 ENCOUNTER FOR THERAPEUTIC DRUG MONITORING: ICD-10-CM

## 2023-10-09 LAB
ALBUMIN SERPL-MCNC: 4.1 G/DL (ref 3.4–5)
ALBUMIN/GLOB SERPL: 1.4 {RATIO} (ref 1–2)
ALP LIVER SERPL-CCNC: 65 U/L
ALT SERPL-CCNC: 35 U/L
ANION GAP SERPL CALC-SCNC: 4 MMOL/L (ref 0–18)
AST SERPL-CCNC: 15 U/L (ref 15–37)
BASOPHILS # BLD AUTO: 0.05 X10(3) UL (ref 0–0.2)
BASOPHILS NFR BLD AUTO: 1.1 %
BILIRUB SERPL-MCNC: 0.4 MG/DL (ref 0.1–2)
BILIRUB UR QL STRIP.AUTO: NEGATIVE
BUN BLD-MCNC: 22 MG/DL (ref 7–18)
CALCIUM BLD-MCNC: 9.6 MG/DL (ref 8.5–10.1)
CHLORIDE SERPL-SCNC: 104 MMOL/L (ref 98–112)
CLARITY UR REFRACT.AUTO: CLEAR
CO2 SERPL-SCNC: 27 MMOL/L (ref 21–32)
COLOR UR AUTO: YELLOW
CREAT BLD-MCNC: 0.95 MG/DL
CRP SERPL-MCNC: 0.95 MG/DL (ref ?–0.3)
EGFRCR SERPLBLD CKD-EPI 2021: 89 ML/MIN/1.73M2 (ref 60–?)
EOSINOPHIL # BLD AUTO: 0.08 X10(3) UL (ref 0–0.7)
EOSINOPHIL NFR BLD AUTO: 1.7 %
ERYTHROCYTE [DISTWIDTH] IN BLOOD BY AUTOMATED COUNT: 13 %
ERYTHROCYTE [SEDIMENTATION RATE] IN BLOOD: 9 MM/HR
EST. AVERAGE GLUCOSE BLD GHB EST-MCNC: 114 MG/DL (ref 68–126)
FASTING STATUS PATIENT QL REPORTED: YES
GLOBULIN PLAS-MCNC: 3 G/DL (ref 2.8–4.4)
GLUCOSE BLD-MCNC: 114 MG/DL (ref 70–99)
GLUCOSE UR STRIP.AUTO-MCNC: NORMAL MG/DL
HBA1C MFR BLD: 5.6 % (ref ?–5.7)
HCT VFR BLD AUTO: 46 %
HGB BLD-MCNC: 15 G/DL
IMM GRANULOCYTES # BLD AUTO: 0.02 X10(3) UL (ref 0–1)
IMM GRANULOCYTES NFR BLD: 0.4 %
INR BLD: 0.98 (ref 0.85–1.16)
KETONES UR STRIP.AUTO-MCNC: NEGATIVE MG/DL
LEUKOCYTE ESTERASE UR QL STRIP.AUTO: NEGATIVE
LYMPHOCYTES # BLD AUTO: 1.17 X10(3) UL (ref 1–4)
LYMPHOCYTES NFR BLD AUTO: 25.3 %
MCH RBC QN AUTO: 26.4 PG (ref 26–34)
MCHC RBC AUTO-ENTMCNC: 32.6 G/DL (ref 31–37)
MCV RBC AUTO: 80.8 FL
MONOCYTES # BLD AUTO: 0.42 X10(3) UL (ref 0.1–1)
MONOCYTES NFR BLD AUTO: 9.1 %
MRSA NASAL: NEGATIVE
NEUTROPHILS # BLD AUTO: 2.88 X10 (3) UL (ref 1.5–7.7)
NEUTROPHILS # BLD AUTO: 2.88 X10(3) UL (ref 1.5–7.7)
NEUTROPHILS NFR BLD AUTO: 62.4 %
NITRITE UR QL STRIP.AUTO: NEGATIVE
OSMOLALITY SERPL CALC.SUM OF ELEC: 284 MOSM/KG (ref 275–295)
PH UR STRIP.AUTO: 7 [PH] (ref 5–8)
PLATELET # BLD AUTO: 229 10(3)UL (ref 150–450)
POTASSIUM SERPL-SCNC: 4.5 MMOL/L (ref 3.5–5.1)
PROT SERPL-MCNC: 7.1 G/DL (ref 6.4–8.2)
PROT UR STRIP.AUTO-MCNC: 50 MG/DL
PROTHROMBIN TIME: 13 SECONDS (ref 11.6–14.8)
RBC # BLD AUTO: 5.69 X10(6)UL
RBC UR QL AUTO: NEGATIVE
SODIUM SERPL-SCNC: 135 MMOL/L (ref 136–145)
SP GR UR STRIP.AUTO: 1.02 (ref 1–1.03)
STAPH A BY PCR: NEGATIVE
UROBILINOGEN UR STRIP.AUTO-MCNC: NORMAL MG/DL
WBC # BLD AUTO: 4.6 X10(3) UL (ref 4–11)

## 2023-10-09 PROCEDURE — 83036 HEMOGLOBIN GLYCOSYLATED A1C: CPT

## 2023-10-09 PROCEDURE — 87641 MR-STAPH DNA AMP PROBE: CPT

## 2023-10-09 PROCEDURE — 36415 COLL VENOUS BLD VENIPUNCTURE: CPT

## 2023-10-09 PROCEDURE — 85025 COMPLETE CBC W/AUTO DIFF WBC: CPT

## 2023-10-09 PROCEDURE — 85610 PROTHROMBIN TIME: CPT

## 2023-10-09 PROCEDURE — 80053 COMPREHEN METABOLIC PANEL: CPT

## 2023-10-09 PROCEDURE — 87640 STAPH A DNA AMP PROBE: CPT

## 2023-10-09 PROCEDURE — 81001 URINALYSIS AUTO W/SCOPE: CPT

## 2023-10-09 PROCEDURE — 86140 C-REACTIVE PROTEIN: CPT

## 2023-10-09 PROCEDURE — 85652 RBC SED RATE AUTOMATED: CPT

## 2023-10-30 ENCOUNTER — TELEPHONE (OUTPATIENT)
Dept: FAMILY MEDICINE CLINIC | Facility: CLINIC | Age: 63
End: 2023-10-30

## 2023-10-30 NOTE — TELEPHONE ENCOUNTER
[facility] and requesting a copy of the patients pre-op exam and clearance as he is scheduled for surgery on 11/08/2023.      Fax# [phone]

## 2023-10-30 NOTE — TELEPHONE ENCOUNTER
May proceed with surgery at an acceptable medical risk. [address] Statement    Physician Name:   Kayleen Sanchez. Johnson Guadaluper,      Patient Name:  Kayli Mckeon    Dates of Service:  10/31/2023      I hereby attest that the medical record entry for the above date(s) of service accurately reflects signatures/notations that I made in my capacity as an DO when I treated/diagnosed the above listed Medicare beneficiary. I do hereby attest that this information is true, accurate and complete to the best of my knowledge and I understand that any falsification, omission, or concealment of material fact may subject me to administrative, civil, or criminal liability. Christina Mir DO      10/31/23

## 2023-11-01 NOTE — TELEPHONE ENCOUNTER
Onsite staff pt wife Myra Bound on [address] [place] will like for the Clearance letter to be fax to [facility] office and any other labs or test that they might need. Wife will like to be notified once this has been done. She does not want pt surgery to get cancelled. Per Dr. Jaime Flores office they still have not received clearance from Dr. Gino Vernon.

## 2023-11-01 NOTE — TELEPHONE ENCOUNTER
Fax confirmation, spoke to wife (on [address]) informed. Verbalized understanding. Patient's full name and  verified.

## 2023-11-04 RX ORDER — PAROXETINE 10 MG/1
10 TABLET, FILM COATED ORAL EVERY MORNING
COMMUNITY

## 2023-11-06 ENCOUNTER — LAB ENCOUNTER (OUTPATIENT)
Dept: LAB | Facility: HOSPITAL | Age: 63
End: 2023-11-06
Attending: NEUROLOGICAL SURGERY
Payer: MEDICARE

## 2023-11-06 ENCOUNTER — OFFICE VISIT (OUTPATIENT)
Dept: SURGERY | Facility: CLINIC | Age: 63
End: 2023-11-06
Payer: MEDICARE

## 2023-11-06 DIAGNOSIS — N40.1 BPH WITH OBSTRUCTION/LOWER URINARY TRACT SYMPTOMS: ICD-10-CM

## 2023-11-06 DIAGNOSIS — N13.8 BPH WITH OBSTRUCTION/LOWER URINARY TRACT SYMPTOMS: ICD-10-CM

## 2023-11-06 DIAGNOSIS — R97.20 ELEVATED PSA: Primary | ICD-10-CM

## 2023-11-06 DIAGNOSIS — Z01.818 ENCOUNTER FOR PREADMISSION TESTING: ICD-10-CM

## 2023-11-06 LAB
ANTIBODY SCREEN: NEGATIVE
RH BLOOD TYPE: POSITIVE
RH BLOOD TYPE: POSITIVE

## 2023-11-06 PROCEDURE — 86850 RBC ANTIBODY SCREEN: CPT

## 2023-11-06 PROCEDURE — 36415 COLL VENOUS BLD VENIPUNCTURE: CPT

## 2023-11-06 PROCEDURE — 86901 BLOOD TYPING SEROLOGIC RH(D): CPT

## 2023-11-06 PROCEDURE — 86900 BLOOD TYPING SEROLOGIC ABO: CPT

## 2023-11-06 NOTE — PROGRESS NOTES
Samia Michelle is a 70year old male. HPI:   Patient presents with:  BPH: UroFlow Bladder Ultrasound      18-year-old male in follow-up to a visit 2023 for uroflow bladder scan for postvoid residual volume. He was started empirically on tamsulosin. Reports an 80% or so improvement in symptoms today. Denies any medication side effects. IPSS score today is 5 quality-of-life index is 1. IPSS score at last visit was not done. HISTORY:  Past Medical History:   Diagnosis Date    Anxiety     Back problem     Broken neck (HCC)     High blood pressure     High cholesterol     Rheumatoid arteritis (HCC)       Past Surgical History:   Procedure Laterality Date    COLONOSCOPY N/A 02/10/2020    Procedure: COLONOSCOPY;  Surgeon: Jamey Dong MD;  Location: [facility] ENDOSCOPY    KNEE ARTHROSCOPY      Right knee arthroscopy    OTHER SURGICAL HISTORY      Arthrocentesis of the right knee joint    SPINE SURGERY PROCEDURE UNLISTED      SURGERY - EXTERNAL      LUMBAR MICRODISCECTOMY      Family History   Problem Relation Age of Onset    Other (pulmo) Father 76        leukemnia    Arthritis Mother     Heart Disorder Mother     Cancer Sister     Heart Disorder Sister     Heart Disorder Brother       Social History:   Social History     Socioeconomic History    Marital status:    Tobacco Use    Smoking status: Former     Packs/day: 0.50     Years: 20.00     Additional pack years: 0.00     Total pack years: 10.00     Types: Cigarettes     Quit date: 2016     Years since quittin.8    Smokeless tobacco: Never    Tobacco comments:     I want a lung scan my sister  of Lung Cancer. Vaping Use    Vaping Use: Never used   Substance and Sexual Activity    Alcohol use: Not Currently     Alcohol/week: 2.0 standard drinks of alcohol     Types: 2 Standard drinks or equivalent per week     Comment: social    Drug use: No   Other Topics Concern    Caffeine Concern Yes     Comment: Coffee, 4 cups daily. Left Handed No    Right Handed Yes    Currently spends a great deal of time in the sun No    History of tanning No    Hx of Spending [address] of Time in Sun No    Bad sunburns in the past No    Tanning Salons in the Past No    Hx of Radiation Treatments No        Medications (Active prior to today's visit):  Current Outpatient Medications   Medication Sig Dispense Refill    PARoxetine 10 MG Oral Tab Take 1 tablet (10 mg total) by mouth every morning. tamsulosin 0.4 MG Oral Cap Take 1 capsule (0.4 mg total) by mouth daily. Take 1/2 hour following the same meal each day 90 capsule 3    rosuvastatin 10 MG Oral Tab Take 1 tablet (10 mg total) by mouth nightly. 90 tablet 1    losartan 25 MG Oral Tab Take 1 tablet (25 mg total) by mouth daily. (Patient taking differently: Take 1 tablet (25 mg total) by mouth at bedtime.) 90 tablet 3       Allergies: Other                   ANAPHYLAXIS    Comment:Bee Sting      ROS:       PHYSICAL EXAM:   Voided Volume: 196 mL    Maximum flow rate: 17 mL/s    Average flow rate: 7.3 mL/s    Voiding curve Shape: Intermittent    Bladder US for post void residual volume: 39 mL      Conclusion of study: Normal uroflow and bladder scan for postvoid residual volume       ASSESSMENT/PLAN:   Assessment   Elevated psa  (primary encounter diagnosis)  Bph with obstruction/lower urinary tract symptoms    Recommend:  - He is scheduled for spine surgery in 2 days. - Continue on tamsulosin. - Follow-up otherwise tentatively in 1 year. Orders This Visit:  No orders of the defined types were placed in this encounter.       Meds This Visit:  Requested Prescriptions      No prescriptions requested or ordered in this encounter       Imaging & Referrals:  None     11/6/2023  Rachid Palma MD

## 2023-11-08 ENCOUNTER — ANESTHESIA (OUTPATIENT)
Dept: SURGERY | Facility: HOSPITAL | Age: 63
End: 2023-11-08
Payer: MEDICARE

## 2023-11-08 ENCOUNTER — HOSPITAL ENCOUNTER (INPATIENT)
Facility: HOSPITAL | Age: 63
LOS: 1 days | Discharge: HOME | End: 2023-11-09
Attending: NEUROLOGICAL SURGERY | Admitting: NEUROLOGICAL SURGERY
Payer: MEDICARE

## 2023-11-08 ENCOUNTER — ANESTHESIA EVENT (OUTPATIENT)
Dept: SURGERY | Facility: HOSPITAL | Age: 63
End: 2023-11-08
Payer: MEDICARE

## 2023-11-08 ENCOUNTER — APPOINTMENT (OUTPATIENT)
Dept: GENERAL RADIOLOGY | Facility: HOSPITAL | Age: 63
End: 2023-11-08
Attending: NEUROLOGICAL SURGERY
Payer: MEDICARE

## 2023-11-08 DIAGNOSIS — Z01.818 ENCOUNTER FOR PREADMISSION TESTING: Primary | ICD-10-CM

## 2023-11-08 PROBLEM — N40.0 BPH (BENIGN PROSTATIC HYPERPLASIA): Status: ACTIVE | Noted: 2023-11-08

## 2023-11-08 PROBLEM — Z98.1 S/P LUMBAR FUSION: Status: ACTIVE | Noted: 2023-11-08

## 2023-11-08 PROBLEM — I10 ESSENTIAL HYPERTENSION: Status: ACTIVE | Noted: 2023-11-08

## 2023-11-08 PROCEDURE — 99222 1ST HOSP IP/OBS MODERATE 55: CPT | Performed by: HOSPITALIST

## 2023-11-08 PROCEDURE — 4A11X4G MONITORING OF PERIPHERAL NERVOUS ELECTRICAL ACTIVITY, INTRAOPERATIVE, EXTERNAL APPROACH: ICD-10-PCS | Performed by: NEUROLOGICAL SURGERY

## 2023-11-08 PROCEDURE — 0ST20ZZ RESECTION OF LUMBAR VERTEBRAL DISC, OPEN APPROACH: ICD-10-PCS | Performed by: NEUROLOGICAL SURGERY

## 2023-11-08 PROCEDURE — 01NB0ZZ RELEASE LUMBAR NERVE, OPEN APPROACH: ICD-10-PCS | Performed by: NEUROLOGICAL SURGERY

## 2023-11-08 PROCEDURE — 76000 FLUOROSCOPY <1 HR PHYS/QHP: CPT | Performed by: NEUROLOGICAL SURGERY

## 2023-11-08 PROCEDURE — 0SG00AJ FUSION OF LUMBAR VERTEBRAL JOINT WITH INTERBODY FUSION DEVICE, POSTERIOR APPROACH, ANTERIOR COLUMN, OPEN APPROACH: ICD-10-PCS | Performed by: NEUROLOGICAL SURGERY

## 2023-11-08 PROCEDURE — 0SG0071 FUSION OF LUMBAR VERTEBRAL JOINT WITH AUTOLOGOUS TISSUE SUBSTITUTE, POSTERIOR APPROACH, POSTERIOR COLUMN, OPEN APPROACH: ICD-10-PCS | Performed by: NEUROLOGICAL SURGERY

## 2023-11-08 DEVICE — TLX20, 9X11X31MM 20°
Type: IMPLANTABLE DEVICE | Site: BACK | Status: FUNCTIONAL
Brand: TLX

## 2023-11-08 DEVICE — BONE GRAFT KIT 7510100 INFUSE X SMALL
Type: IMPLANTABLE DEVICE | Site: BACK | Status: FUNCTIONAL
Brand: INFUSE® BONE GRAFT

## 2023-11-08 DEVICE — OSTEOCEL PRO LARGE BULK BUY: Type: IMPLANTABLE DEVICE | Site: BACK | Status: FUNCTIONAL

## 2023-11-08 DEVICE — RELINE MAS TI ROD 5.5X30 LRDTC: Type: IMPLANTABLE DEVICE | Site: BACK | Status: FUNCTIONAL

## 2023-11-08 DEVICE — K WIRE FIX NIT BVL BLNT TIP PRECEPT: Type: IMPLANTABLE DEVICE | Site: BACK

## 2023-11-08 DEVICE — SCREW SPNL DIA5.5MM OPN TULIP LOK RELINE: Type: IMPLANTABLE DEVICE | Site: BACK | Status: FUNCTIONAL

## 2023-11-08 RX ORDER — MORPHINE SULFATE 4 MG/ML
2 INJECTION, SOLUTION INTRAMUSCULAR; INTRAVENOUS EVERY 10 MIN PRN
Status: DISCONTINUED | OUTPATIENT
Start: 2023-11-08 | End: 2023-11-08 | Stop reason: HOSPADM

## 2023-11-08 RX ORDER — HYDROMORPHONE HYDROCHLORIDE 1 MG/ML
0.2 INJECTION, SOLUTION INTRAMUSCULAR; INTRAVENOUS; SUBCUTANEOUS EVERY 2 HOUR PRN
Status: DISCONTINUED | OUTPATIENT
Start: 2023-11-08 | End: 2023-11-09

## 2023-11-08 RX ORDER — DEXAMETHASONE SODIUM PHOSPHATE 4 MG/ML
VIAL (ML) INJECTION AS NEEDED
Status: DISCONTINUED | OUTPATIENT
Start: 2023-11-08 | End: 2023-11-08 | Stop reason: SURG

## 2023-11-08 RX ORDER — PAROXETINE 10 MG/1
10 TABLET, FILM COATED ORAL EVERY MORNING
Status: DISCONTINUED | OUTPATIENT
Start: 2023-11-09 | End: 2023-11-09

## 2023-11-08 RX ORDER — LIDOCAINE HYDROCHLORIDE 10 MG/ML
INJECTION, SOLUTION EPIDURAL; INFILTRATION; INTRACAUDAL; PERINEURAL AS NEEDED
Status: DISCONTINUED | OUTPATIENT
Start: 2023-11-08 | End: 2023-11-08 | Stop reason: SURG

## 2023-11-08 RX ORDER — TAMSULOSIN HYDROCHLORIDE 0.4 MG/1
0.4 CAPSULE ORAL DAILY
Status: DISCONTINUED | OUTPATIENT
Start: 2023-11-09 | End: 2023-11-09

## 2023-11-08 RX ORDER — CEFAZOLIN SODIUM/WATER 2 G/20 ML
2 SYRINGE (ML) INTRAVENOUS EVERY 8 HOURS
Qty: 40 ML | Refills: 0 | Status: DISCONTINUED | OUTPATIENT
Start: 2023-11-09 | End: 2023-11-09

## 2023-11-08 RX ORDER — HYDROCODONE BITARTRATE AND ACETAMINOPHEN 5; 325 MG/1; MG/1
1 TABLET ORAL EVERY 4 HOURS PRN
Status: DISCONTINUED | OUTPATIENT
Start: 2023-11-08 | End: 2023-11-09

## 2023-11-08 RX ORDER — METHOCARBAMOL 750 MG/1
750 TABLET, FILM COATED ORAL 3 TIMES DAILY
Qty: 30 TABLET | Refills: 0 | Status: SHIPPED | OUTPATIENT
Start: 2023-11-08 | End: 2023-11-18

## 2023-11-08 RX ORDER — FAMOTIDINE 20 MG/1
20 TABLET, FILM COATED ORAL ONCE
Status: COMPLETED | OUTPATIENT
Start: 2023-11-08 | End: 2023-11-08

## 2023-11-08 RX ORDER — HYDROMORPHONE HYDROCHLORIDE 1 MG/ML
0.4 INJECTION, SOLUTION INTRAMUSCULAR; INTRAVENOUS; SUBCUTANEOUS EVERY 2 HOUR PRN
Status: DISCONTINUED | OUTPATIENT
Start: 2023-11-08 | End: 2023-11-09

## 2023-11-08 RX ORDER — LOSARTAN POTASSIUM 25 MG/1
25 TABLET ORAL NIGHTLY
Status: DISCONTINUED | OUTPATIENT
Start: 2023-11-08 | End: 2023-11-09

## 2023-11-08 RX ORDER — MORPHINE SULFATE 10 MG/ML
6 INJECTION, SOLUTION INTRAMUSCULAR; INTRAVENOUS EVERY 10 MIN PRN
Status: DISCONTINUED | OUTPATIENT
Start: 2023-11-08 | End: 2023-11-08 | Stop reason: HOSPADM

## 2023-11-08 RX ORDER — ROCURONIUM BROMIDE 10 MG/ML
INJECTION, SOLUTION INTRAVENOUS AS NEEDED
Status: DISCONTINUED | OUTPATIENT
Start: 2023-11-08 | End: 2023-11-08 | Stop reason: SURG

## 2023-11-08 RX ORDER — METOCLOPRAMIDE HYDROCHLORIDE 5 MG/ML
10 INJECTION INTRAMUSCULAR; INTRAVENOUS EVERY 8 HOURS PRN
Status: DISCONTINUED | OUTPATIENT
Start: 2023-11-08 | End: 2023-11-09

## 2023-11-08 RX ORDER — DOCUSATE SODIUM 100 MG/1
100 CAPSULE, LIQUID FILLED ORAL 2 TIMES DAILY
Status: DISCONTINUED | OUTPATIENT
Start: 2023-11-08 | End: 2023-11-09

## 2023-11-08 RX ORDER — METHOCARBAMOL 750 MG/1
750 TABLET, FILM COATED ORAL 3 TIMES DAILY PRN
Status: DISCONTINUED | OUTPATIENT
Start: 2023-11-08 | End: 2023-11-09

## 2023-11-08 RX ORDER — HYDROCODONE BITARTRATE AND ACETAMINOPHEN 10; 325 MG/1; MG/1
2 TABLET ORAL EVERY 4 HOURS PRN
Status: DISCONTINUED | OUTPATIENT
Start: 2023-11-08 | End: 2023-11-09

## 2023-11-08 RX ORDER — POLYETHYLENE GLYCOL 3350 17 G/17G
17 POWDER, FOR SOLUTION ORAL DAILY PRN
Status: DISCONTINUED | OUTPATIENT
Start: 2023-11-08 | End: 2023-11-09

## 2023-11-08 RX ORDER — ONDANSETRON 2 MG/ML
4 INJECTION INTRAMUSCULAR; INTRAVENOUS EVERY 6 HOURS PRN
Status: DISCONTINUED | OUTPATIENT
Start: 2023-11-08 | End: 2023-11-09

## 2023-11-08 RX ORDER — BUPIVACAINE HYDROCHLORIDE AND EPINEPHRINE 2.5; 5 MG/ML; UG/ML
INJECTION, SOLUTION INFILTRATION; PERINEURAL AS NEEDED
Status: DISCONTINUED | OUTPATIENT
Start: 2023-11-08 | End: 2023-11-08 | Stop reason: HOSPADM

## 2023-11-08 RX ORDER — SENNOSIDES 8.6 MG
17.2 TABLET ORAL NIGHTLY
Status: DISCONTINUED | OUTPATIENT
Start: 2023-11-08 | End: 2023-11-09

## 2023-11-08 RX ORDER — ACETAMINOPHEN 500 MG
1000 TABLET ORAL ONCE
Status: COMPLETED | OUTPATIENT
Start: 2023-11-08 | End: 2023-11-08

## 2023-11-08 RX ORDER — CEFAZOLIN SODIUM/WATER 2 G/20 ML
2 SYRINGE (ML) INTRAVENOUS ONCE
Status: COMPLETED | OUTPATIENT
Start: 2023-11-08 | End: 2023-11-08

## 2023-11-08 RX ORDER — NALOXONE HYDROCHLORIDE 0.4 MG/ML
0.08 INJECTION, SOLUTION INTRAMUSCULAR; INTRAVENOUS; SUBCUTANEOUS AS NEEDED
Status: DISCONTINUED | OUTPATIENT
Start: 2023-11-08 | End: 2023-11-08 | Stop reason: HOSPADM

## 2023-11-08 RX ORDER — MORPHINE SULFATE 4 MG/ML
4 INJECTION, SOLUTION INTRAMUSCULAR; INTRAVENOUS EVERY 10 MIN PRN
Status: DISCONTINUED | OUTPATIENT
Start: 2023-11-08 | End: 2023-11-08 | Stop reason: HOSPADM

## 2023-11-08 RX ORDER — BISACODYL 10 MG
10 SUPPOSITORY, RECTAL RECTAL
Status: DISCONTINUED | OUTPATIENT
Start: 2023-11-08 | End: 2023-11-09

## 2023-11-08 RX ORDER — ENEMA 19; 7 G/133ML; G/133ML
1 ENEMA RECTAL ONCE AS NEEDED
Status: DISCONTINUED | OUTPATIENT
Start: 2023-11-08 | End: 2023-11-09

## 2023-11-08 RX ORDER — HYDROMORPHONE HYDROCHLORIDE 1 MG/ML
0.4 INJECTION, SOLUTION INTRAMUSCULAR; INTRAVENOUS; SUBCUTANEOUS EVERY 5 MIN PRN
Status: DISCONTINUED | OUTPATIENT
Start: 2023-11-08 | End: 2023-11-08 | Stop reason: HOSPADM

## 2023-11-08 RX ORDER — HYDROMORPHONE HYDROCHLORIDE 1 MG/ML
0.6 INJECTION, SOLUTION INTRAMUSCULAR; INTRAVENOUS; SUBCUTANEOUS EVERY 5 MIN PRN
Status: DISCONTINUED | OUTPATIENT
Start: 2023-11-08 | End: 2023-11-08 | Stop reason: HOSPADM

## 2023-11-08 RX ORDER — DIPHENHYDRAMINE HCL 25 MG
25 CAPSULE ORAL EVERY 4 HOURS PRN
Status: DISCONTINUED | OUTPATIENT
Start: 2023-11-08 | End: 2023-11-09

## 2023-11-08 RX ORDER — DIPHENHYDRAMINE HYDROCHLORIDE 50 MG/ML
25 INJECTION INTRAMUSCULAR; INTRAVENOUS EVERY 4 HOURS PRN
Status: DISCONTINUED | OUTPATIENT
Start: 2023-11-08 | End: 2023-11-09

## 2023-11-08 RX ORDER — SODIUM CHLORIDE, SODIUM LACTATE, POTASSIUM CHLORIDE, CALCIUM CHLORIDE 600; 310; 30; 20 MG/100ML; MG/100ML; MG/100ML; MG/100ML
INJECTION, SOLUTION INTRAVENOUS CONTINUOUS
Status: DISCONTINUED | OUTPATIENT
Start: 2023-11-08 | End: 2023-11-08 | Stop reason: HOSPADM

## 2023-11-08 RX ORDER — SODIUM CHLORIDE, SODIUM LACTATE, POTASSIUM CHLORIDE, CALCIUM CHLORIDE 600; 310; 30; 20 MG/100ML; MG/100ML; MG/100ML; MG/100ML
INJECTION, SOLUTION INTRAVENOUS CONTINUOUS
Status: DISCONTINUED | OUTPATIENT
Start: 2023-11-08 | End: 2023-11-09

## 2023-11-08 RX ORDER — HYDROMORPHONE HYDROCHLORIDE 1 MG/ML
0.2 INJECTION, SOLUTION INTRAMUSCULAR; INTRAVENOUS; SUBCUTANEOUS EVERY 5 MIN PRN
Status: DISCONTINUED | OUTPATIENT
Start: 2023-11-08 | End: 2023-11-08 | Stop reason: HOSPADM

## 2023-11-08 RX ORDER — PHENYLEPHRINE HCL 10 MG/ML
VIAL (ML) INJECTION AS NEEDED
Status: DISCONTINUED | OUTPATIENT
Start: 2023-11-08 | End: 2023-11-08 | Stop reason: SURG

## 2023-11-08 RX ORDER — MAGNESIUM SULFATE HEPTAHYDRATE 500 MG/ML
INJECTION, SOLUTION INTRAMUSCULAR; INTRAVENOUS AS NEEDED
Status: DISCONTINUED | OUTPATIENT
Start: 2023-11-08 | End: 2023-11-08 | Stop reason: SURG

## 2023-11-08 RX ORDER — ONDANSETRON 2 MG/ML
INJECTION INTRAMUSCULAR; INTRAVENOUS AS NEEDED
Status: DISCONTINUED | OUTPATIENT
Start: 2023-11-08 | End: 2023-11-08 | Stop reason: SURG

## 2023-11-08 RX ORDER — HYDROCODONE BITARTRATE AND ACETAMINOPHEN 5; 325 MG/1; MG/1
1 TABLET ORAL EVERY 6 HOURS PRN
Qty: 28 TABLET | Refills: 0 | Status: SHIPPED | OUTPATIENT
Start: 2023-11-08 | End: 2023-11-15

## 2023-11-08 RX ORDER — ROSUVASTATIN CALCIUM 10 MG/1
10 TABLET, COATED ORAL NIGHTLY
Status: DISCONTINUED | OUTPATIENT
Start: 2023-11-08 | End: 2023-11-09

## 2023-11-08 RX ADMIN — LIDOCAINE HYDROCHLORIDE 100 MG: 10 INJECTION, SOLUTION EPIDURAL; INFILTRATION; INTRACAUDAL; PERINEURAL at 15:32:00

## 2023-11-08 RX ADMIN — ONDANSETRON 4 MG: 2 INJECTION INTRAMUSCULAR; INTRAVENOUS at 17:30:00

## 2023-11-08 RX ADMIN — SODIUM CHLORIDE, SODIUM LACTATE, POTASSIUM CHLORIDE, CALCIUM CHLORIDE: 600; 310; 30; 20 INJECTION, SOLUTION INTRAVENOUS at 17:43:00

## 2023-11-08 RX ADMIN — SODIUM CHLORIDE, SODIUM LACTATE, POTASSIUM CHLORIDE, CALCIUM CHLORIDE: 600; 310; 30; 20 INJECTION, SOLUTION INTRAVENOUS at 16:21:00

## 2023-11-08 RX ADMIN — CEFAZOLIN SODIUM/WATER 2 G: 2 G/20 ML SYRINGE (ML) INTRAVENOUS at 15:59:00

## 2023-11-08 RX ADMIN — ROCURONIUM BROMIDE 10 MG: 10 INJECTION, SOLUTION INTRAVENOUS at 15:32:00

## 2023-11-08 RX ADMIN — SODIUM CHLORIDE, SODIUM LACTATE, POTASSIUM CHLORIDE, CALCIUM CHLORIDE: 600; 310; 30; 20 INJECTION, SOLUTION INTRAVENOUS at 15:27:00

## 2023-11-08 RX ADMIN — SODIUM CHLORIDE, SODIUM LACTATE, POTASSIUM CHLORIDE, CALCIUM CHLORIDE: 600; 310; 30; 20 INJECTION, SOLUTION INTRAVENOUS at 16:09:00

## 2023-11-08 RX ADMIN — MAGNESIUM SULFATE HEPTAHYDRATE 2 G: 500 INJECTION, SOLUTION INTRAMUSCULAR; INTRAVENOUS at 15:27:00

## 2023-11-08 RX ADMIN — DEXAMETHASONE SODIUM PHOSPHATE 8 MG: 4 MG/ML VIAL (ML) INJECTION at 15:33:00

## 2023-11-08 RX ADMIN — PHENYLEPHRINE HCL 200 MCG: 10 MG/ML VIAL (ML) INJECTION at 15:50:00

## 2023-11-08 NOTE — PLAN OF CARE
Rolin Bence, M.D., F.A.A.N.S.  of [facility] [place] [address]  Board Certified Neurosurgeon                                     Koko Blum 23 Neurosurgery        [facility] Sonali DanielsAl[facility] [address], [address] [place] [facility]    PHONE  [phone]    Geovannytn           11/8/2023  2:21 PM    PRE-OPERATIVE CONSULTATION     Nehal Gaitan was again informed of the nature of the problem, planned treatment, indications and alternatives. We again reviewed the expected benefits of surgery, as well as all reasonably foreseeable risks, including but not limited to infection, bleeding requiring transfusion or reoperation, no relief or worsening of the pain, numbness, weakness, need for assistive devices to get around, injury to the nerves, paralysis, loss of control of the legs/bladder/bowel/sexual function, spinal fluid leak, scar formation, failure of the fusion to heal (made more likely with smoking,) bad position of the screws or cages, migration of the screws or cages, break of the screws or rods, problems above or below the level of fusion due to increased stress on those levels from the fusion and leading to more pain and possibly the need for more surgery, heart attack, blood clot formation, pulmonary embolism, stroke, coma and death. his questions were all answered and he understands what we discussed. he also understands that no guarantees can be made regarding outcome or results. Nehal Gaitan agrees the benefits of surgery outweigh the risks, and wishes to proceed with surgery.       Tanisha Pérez M.D., F.A.A.N.S.

## 2023-11-08 NOTE — H&P
[facility]    History and Physical    Caleb Aragon Patient Status:  Surgery Admit - Inpt    1960 MRN [id number]   Location [place] [address] Attending Naheed Ayala MD   Hosp Day # 0 PCP Tracie Stuart DO     Date:  2023  Date of Admission:  (Not on file)    History provided by:patient  HPI:   No chief complaint on file. HPI  Patient presents to the hospital for L4-5 TLIF. Patient was seen and examined by his PCP Dr. Gino Vernon and cleared for the procedure today. Patient denies fever, weight loss, nausea, vomiting, diarrhea, CP, SOB. History     Past Medical History:   Diagnosis Date    Anxiety     Back problem     Broken neck (HCC)     High blood pressure     High cholesterol     Rheumatoid arteritis (HCC)      Past Surgical History:   Procedure Laterality Date    COLONOSCOPY N/A 02/10/2020    Procedure: COLONOSCOPY;  Surgeon: Dayne Valencia MD;  Location: [facility] ENDOSCOPY    KNEE ARTHROSCOPY      Right knee arthroscopy    OTHER SURGICAL HISTORY      Arthrocentesis of the right knee joint    SPINE SURGERY PROCEDURE UNLISTED      SURGERY - EXTERNAL      LUMBAR MICRODISCECTOMY     Family History   Problem Relation Age of Onset    Other (pulmo) Father 76        leukemnia    Arthritis Mother     Heart Disorder Mother     Cancer Sister     Heart Disorder Sister     Heart Disorder Brother      Social History:  Social History     Socioeconomic History    Marital status:    Tobacco Use    Smoking status: Former     Packs/day: 0.50     Years: 20.00     Additional pack years: 0.00     Total pack years: 10.00     Types: Cigarettes     Quit date: 2016     Years since quittin.8    Smokeless tobacco: Never    Tobacco comments:     I want a lung scan my sister  of Lung Cancer.    Vaping Use    Vaping Use: Never used   Substance and Sexual Activity    Alcohol use: Not Currently     Alcohol/week: 2.0 standard drinks of alcohol     Types: 2 Standard drinks or equivalent per week     Comment: social    Drug use: No   Other Topics Concern    Caffeine Concern Yes     Comment: Coffee, 4 cups daily. Left Handed No    Right Handed Yes    Currently spends a great deal of time in the sun No    History of tanning No    Hx of Spending [address] of Time in [place] No    Bad sunburns in the past No    Tanning Salons in the Past No    Hx of Radiation Treatments No     Allergies/Medications: Allergies: Allergies   Allergen Reactions    Other ANAPHYLAXIS     Bee Sting      No medications prior to admission. Review of Systems:   Review of Systems  See HPI      Physical Exam:   Vital Signs:  Height 5' 6\" (1.676 m), weight 190 lb (86.2 kg). Physical Exam  No changes to physical exam.       Results:     Lab Results   Component Value Date    WBC 4.6 10/09/2023    HGB 15.0 10/09/2023    HCT 46.0 10/09/2023    .0 10/09/2023    CREATSERUM 0.95 10/09/2023    BUN 22 (H) 10/09/2023     (L) 10/09/2023    K 4.5 10/09/2023     10/09/2023    CO2 27.0 10/09/2023     (H) 10/09/2023    CA 9.6 10/09/2023    ALB 4.1 10/09/2023    ALKPHO 65 10/09/2023    BILT 0.4 10/09/2023    TP 7.1 10/09/2023    AST 15 10/09/2023    ALT 35 10/09/2023    INR 0.98 10/09/2023    TSH 1.750 01/10/2023    PSA 2.84 04/07/2023    ESRML 9 10/09/2023    CRP 0.95 (H) 10/09/2023     No results found. Assessment/Plan:     * No active hospital problems. *        Patient presents to the hospital for L4-5 TLIF. He was seen and cleared by his PCP for intervention today.  There are no changes to ROS or physical exam.       Early OLEGARIO Matta  11/8/2023

## 2023-11-08 NOTE — ANESTHESIA PROCEDURE NOTES
Airway  Date/Time: 11/8/2023 3:34 PM  Urgency: Elective    Airway not difficult    General Information and Staff    Patient location during procedure: OR  Resident/CRNA: Nba Watson CRNA  Performed: CRNA   Performed by: Nba Watson CRNA  Authorized by: Santo Boston MD      Indications and Patient Condition  Indications for airway management: anesthesia  Sedation level: deep  Preoxygenated: yes  Patient position: sniffing  Mask difficulty assessment: 1 - vent by mask    Final Airway Details  Final airway type: endotracheal airway      Successful airway: ETT  Cuffed: yes   Successful intubation technique: direct laryngoscopy  Endotracheal tube insertion site: oral  Blade size: #4  ETT size (mm): 7.5    Cormack-Lehane Classification: grade IIB - view of arytenoids or posterior of glottis only  Placement verified by: capnometry   Cuff volume (mL): 10  Measured from: teeth  ETT to teeth (cm): 22  Number of attempts at approach: 1    Additional Comments  Atraumatic insertion, dentition and soft structures as preop. Soft bite block inserted between left and right molars, tongue midline.

## 2023-11-08 NOTE — ANESTHESIA PROCEDURE NOTES
Peripheral IV  Date/Time: 11/8/2023 3:37 PM  Inserted by: Bernard Varela CRNA    Placement  Needle size: 16 G  Laterality: left  Location: wrist  Local anesthetic: none  Site prep: alcohol  Technique: anatomical landmarks  Attempts: 1

## 2023-11-08 NOTE — OPERATIVE REPORT
OPERATIVE REPORT    PATIENT NAME: Corona Cueto    DATE OF OPERATION:  11/8/2023    PREOPERATIVE DIAGNOSIS:  1. Lumbar stenosis with neurogenic claudication             2. Lumbar spondylolisthesis    POSTOPERATIVE DIAGNOSIS: 1. same               2. same    OPERATIVE PROCEDURE:  1.  Lumbar 4 through 5 transforaminal intervertebral arthrodesis and fusion, utilizing titanium interbodies/cages and allograft and locally-harvested morselized autograft  2. Lumbar 4 through 5 hemilaminectomy and left complete facetectomy and left posterolateral arthrodesis, utilizing locally-harvested morselized autograft  3. Lumbar 4 through 5 pedicle screw and meghana instrumentation for augmentation of fusion purposes. 4.  Real time intraoperative fluoroscopy and C-arm with the image guidance. 5.  Real time intraoperative motor-evoked potentials, SSEP and nerve monitoring. SURGEON:  Cesar Crooks M.D.    ASSISTANT: Josh Gardner PA-C    ANESTHESIA: General endotracheal anesthesia with TIVA and local anesthetic. ESTIMATED BLOOD LOSS: 10 cc    INTRAVENOUS FLUIDS AND URINE OUTPUT: Per anesthesia sheet    TRANSFUSION: None    IMPLANTS: Nuva    DRAIN: none    SPECIMEN: None    COMPLICATIONS: none    PROCEDURE:    After informed consent was obtained, the patient was brought to the operating room. After the uneventful induction of general endotracheal anesthesia and placement of the Mary catheter, electrodes and monitoring devices were placed. The patient was then positioned on the operating room table, an open-frame Tevin Table, in the prone position. The arms were supported and the neck physiologically extended. All pressure points were adequately padded. The patient's eyes were protected from exposure to prolonged pressure. Baseline electrophysiological potentials were obtained.  Subsequently, we utilized lateral and AP fluoroscopic guidance to identify our incision sites relative to the lumbar bony anatomy corresponding to the correct level(s). We identified the incision sites for our tubular retractor placement as well as the percutaneous screws, contralaterally. They were marked out on the skin. The patient was prepped and draped sterilely. The C-arm was also draped sterilely into the field. Time-Out was done in the proper fashion. Perioperative antibiosis was given within one hour of incision. After the \"Time-Out\", we infiltrated the incisions with our usual local anesthetic. We incised utilizing a 10-blade scalpel. The subcutaneous tissues were opened with Bovie cautery down to the fascia. The fascia was opened sharply using both sharp and blunt dissection. We then used sequential dilators to place a tubular retractor at the L4-5 level along the left laminofacet junction. This was done with fluoroscopic guidance. The tubular retractor was then connected to a table-mounted arm for added stability. The soft tissues overlying the laminofacet junction were resected utilizing mono- and bipolar electrocautery. We then used a high-speed ernestina the create an ipsilateral laminectomy, undercutting the lamina contralaterally in order to accomplish a generous central decompression. The medial ispilateral facet was then drilled in order to decompress the lateral recess. A high-speed ernestina was then utilized to complete the generous laminectomy and the L4 pars was then transected perpendicularly. This detached the L4 articulating facet en-bloc. We then drilled down the corresponding L5 articulating surface to expose the L4-5 intervertebral space and disc. The rest of the bony decompression was accomplished utilizing Kerrison rongeurs. After the soft tissues, including the ligamentum flavum, had been resected, we visualized the ipsilaterally exiting L4 and the en-passage L5 nerve roots verifying adequate decompression.  The disc space was then re-identified and some epidural veins around the disc space were coagulated and cut sharply with the microscissors. The disc space was incised and a radical discectomy was performed using a series of Suzanna and [employer], extending across the midline with angled curettes. The cartilaginous endplates were then removed using a series of curettes, rongeurs, rasps, and zac, removing the cartilaginous endplates and roughing up the bony endplates to get good bleeding bone along both surfaces for fusion purposes. This was inspected with direct visualization and fluoroscopy repeatedly. The wound was copiously irrigated. The nerves were maintained safe and protected with continued direct visualization. No additional disc or cartilaginous endplate was encountered and very good bleeding bony endplates were seen. We trialed for the properly-sized intervertebral cage and then filled it with allograft. About 9 cc of Osteocel Plus with some of the drilled autologous bone were impacted along the ventral aspect of the disc space and also to the right side of the disc space utilizing a funnel applicator and graft delivery system. While gently and carefully retracting the en-passage nerve root medially, the cage was impacted into position and 75 % expanded to proper alignment. Approximately, 15 degrees of segmental lordosis were gained. Fluoroscopy confirmed good positioning of the cage. We irrigated the surgical site copiously and hemostasis was obtained with bipolar electrocautery and Flowseal. We then decorticated the ipsilateral posterolateral elements generously extending from L4 to L5 with the high-speed ernestina. The rest of our harvested autograft which had been denuded off all soft tissue and morselized was then packed into the posterolateral space and gutter extending from L4 through L5 for posterolateral arthrodesis purposes. The tubular retractor was then slowly removed, verifying hemostasis at every point of the way meticulously with bipolar electrocautery.       Next, after the corresponding incisions were opened with a 10-blade, a monitored Jamshidi needle was used to cannulate the pedicles from L4 to L5 in the traditional transpedicular fashion. We verified electrophysiological readings at good thresholds. The trajectories were fluoroscopically confirmed. Subsequently, the screws with their extenders were placed over guidewires utilizing fluoroscopic guidance. Finally, we tested and stimulated all screws electrophysiologically at appropriate thresholds. At this point in the procedure we proceeded to finish the instrumentation. In cases where a segmental spondylolisthesis was present, we asked our anesthesia colleagues now to provide pharmacological paralysis so that the listhesis would be easier to reduce. Utilizing calipers within the screw extenders, we measured for the properly-sized lordotic rods and placed them appropriately. These were placed through the guide channels of the extenders and easily placed into the screw heads. Locking nuts were provisionally placed. Then, using the torque  and the counter-torque device, each end of the meghana was secured into the tulip heads of the screws, compressing or reducing the construct lightly before tightening the rods. The locking nuts were again revisited with the torque  to confirm tightness. The screw extenders were then removed and final fluoroscopic images documented satisfactory position of the cage, screws, and rods. The wound was copiously irrigated and small bleeding points were controlled with a bipolar electrocautery. The construct was again inspected and found to be quite satisfactory under direct visualization. Still, more irrigation was used and the lumbodorsal fascia was closed using a series of interrupted 0 Vicryl sutures. The subcutaneous tissues were copiously irrigated and closed with an interrupted inverted 3-0 Vicryl suture. The skin was closed with 4-0 Vicryl and Dermabond. There were no complications.   All counts were reported correct. The nerve stimulation of the screws achieved satisfactory thresholds including final placement of the instrumentation. The patient was returned to the supine position, extubated in the operating room, and taken to the recovery room in satisfactory condition, having tolerated the procedure well and moving all fours strongly to command. Please fax a copy of this report to my office at [phone] and the Primary Care Provider of this patient.

## 2023-11-08 NOTE — INTERVAL H&P NOTE
Pre-op Diagnosis: Lumbar stenosis with neurogenic claudication    The above referenced H&P was reviewed by Elia Alfaro MD on 11/8/2023, the patient was examined and no significant changes have occurred in the patient's condition since the H&P was performed. I discussed with the patient and/or legal representative the potential benefits, risks and side effects of this procedure; the likelihood of the patient achieving goals; and potential problems that might occur during recuperation. I discussed reasonable alternatives to the procedure, including risks, benefits and side effects related to the alternatives and risks related to not receiving this procedure. We will proceed with procedure as planned.

## 2023-11-08 NOTE — DISCHARGE INSTRUCTIONS
DISCHARGE INSTRUCTIONS PER DR. [facility]    Wound Care:   No dressing on the incision necessary. Dab dry the incision after shower/cleaning. No tub baths until first post-op follow-up. Activity:   Resume activity as tolerated. Walking as much as possible is highly encouraged. Limit bending, lifting and twisting. Do not drive while taking narcotic pain medications. Post-Op Medications:   Medications sent to the pharmacy. Take medications as directed. Continue icing of the area of the incision for 30 minutes at a time, multiple   times a day. If a cooling device (e.g. Polar Care) has been provided, utilize as directed per nursing staff. Diet:   Advance diet as tolerated. Follow-up:  Please have patient follow-up in my clinic 2 weeks after surgery. Please have patient call [phone] to verify first post-op appointment.

## 2023-11-09 VITALS
HEART RATE: 79 BPM | BODY MASS INDEX: 34.51 KG/M2 | OXYGEN SATURATION: 98 % | WEIGHT: 214.75 LBS | TEMPERATURE: 98 F | RESPIRATION RATE: 18 BRPM | DIASTOLIC BLOOD PRESSURE: 70 MMHG | SYSTOLIC BLOOD PRESSURE: 136 MMHG | HEIGHT: 66 IN

## 2023-11-09 NOTE — PHYSICAL THERAPY NOTE
PHYSICAL THERAPY EVALUATION - INPATIENT     Room Number: 416/416-A  Evaluation Date: 11/9/2023  Type of Evaluation: Initial   Physician Order: PT Eval and Treat    Presenting Problem: Patient presents to the hospital for L4-5 TLIF. Patient was seen and examined by his PCP Dr. Johnson Mir and cleared for the procedure today. Patient denies fever, weight loss, nausea, vomiting, diarrhea, CP, SOB. Co-Morbidities : anxiety, high blood pressure, high cholesterol  Reason for Therapy: Mobility Dysfunction and Discharge Planning    PHYSICAL THERAPY ASSESSMENT     Patient is a 70year old male admitted 11/8/2023 for L4-5 TLIF. Patient's current functional deficits include impaired strength, impaired standing balance, impaired ambulation tolerance, increased level of physical assistance for performance of ADL's and functional mobility, which are below the patient's pre-admission status. The patient's Approx Degree of Impairment: 11.2% has been calculated based on documentation in the [facility] '6 clicks' Inpatient Basic Mobility Short Form. Research supports that patients with this level of impairment may benefit from home with no continued skilled PT services. Patient will benefit from continued IP PT services to address these deficits in preparation for discharge. DISCHARGE RECOMMENDATIONS  PT Discharge Recommendations: Outpatient PT; Intermittent Supervision (once cleared by MD)    PLAN   Patient is cleared for discharge home from a PT perspective.     PHYSICAL THERAPY MEDICAL/SOCIAL HISTORY     History related to current admission: anxiety, high blood pressure, high cholesterol      Problem List  Principal Problem:    Spinal stenosis of lumbar region with neurogenic claudication  Active Problems:    Hyperlipidemia    Essential hypertension    BPH (benign prostatic hyperplasia)    S/P lumbar fusion      HOME SITUATION  Home Situation  Type of Home: House  Home Layout: Multi-level  Stairs to Enter : 0  Stairs to Bedroom: 17  Railing: Yes  Lives With: Spouse  Drives: Yes  Patient Owned Equipment: None  Patient Regularly Uses: None     Prior Level of [place]: Prior to admission patient was independent with performance of all ADL's and performance of functional mobility. SUBJECTIVE  \"I will be walking up and down the hallways more than 3 times\"    PHYSICAL THERAPY EXAMINATION     OBJECTIVE  Precautions: Spine  Fall Risk: Standard fall risk    WEIGHT BEARING RESTRICTION   None. PAIN ASSESSMENT  Ratin  Location: back  Management Techniques: Activity promotion;Relaxation;Breathing techniques;Repositioning    COGNITION  Overall Cognitive Status:  WFL - within functional limits  Orientation Level:  oriented x4  Following Commands:  follows all commands and directions without difficulty  Safety Judgement:  good awareness of safety precautions    RANGE OF MOTION AND STRENGTH ASSESSMENT  Upper extremity ROM and strength are within functional limits BUE. Lower extremity ROM is within functional limits BLE. Lower extremity strength is within functional limits Right Knee extension  4/5  Left Knee extension  4/5  Right Knee flexion  4/5  Left Knee flexion  4/5  Right Dorsiflexion  4/5  Left Dorsiflexion  4/5    BALANCE  Static Sitting: Good  Dynamic Sitting: Good  Static Standing: Good  Dynamic Standing: Fair    ADDITIONAL TESTS  None. NEUROLOGICAL FINDINGS   None. ACTIVITY TOLERANCE  Pulse: 84  Heart Rate Source: Monitor     BP: 136/70  BP Location: Right arm  BP Method: Automatic  Patient Position: Sitting    O2 WALK  Oxygen Therapy  SPO2% on Room Air at Rest: 97    AM-PAC '6-Clicks' INPATIENT SHORT FORM - BASIC MOBILITY  How much difficulty does the patient currently have. ..   Patient Difficulty: Turning over in bed (including adjusting bedclothes, sheets and blankets)?: None   Patient Difficulty: Sitting down on and standing up from a chair with arms (e.g., wheelchair, bedside commode, etc.): None   Patient Difficulty: Moving from lying on back to sitting on the side of the bed?: None   How much help from another person does the patient currently need. .. Help from Another: Moving to and from a bed to a chair (including a wheelchair)?: None   Help from Another: Need to walk in hospital room?: None   Help from Another: Climbing 3-5 steps with a railing?: A Little     AM-PAC Score:  Raw Score: 23   Approx Degree of Impairment: 11.2%   Standardized Score (AM-PAC Scale): 56.93   CMS Modifier (G-Code): CI    FUNCTIONAL ABILITY STATUS  Functional Mobility/Gait Assessment  Gait Assistance: Supervision  Distance (ft): >150 feet  Assistive Device: None (intermittent use of IV pole)  Pattern:  (decreased duane, decreased step length, forward flexed posture, narrow base of support, verbal cues for sequencing, no overt loss of balance noted)  Stairs: Stairs  How Many Stairs: 3 (patient politely declining further stair negotiation, reports no concerns regarding discharge home. Educated patient regarding energy conservation techniques and having hands on assistance from family for stair negotiation initially upon discharge, verbalizes understanding.)  Device: 1 Rail  Assist: Supervision  Pattern: Ascend and Descend  Ascend and Descend : Reciprocal    Bed Mobility: Not performed this session due to patient requesting to sit in bedside chair at end of session. Educated patient verbally regarding log rolling technique, verbalizes understanding. Transfers: Patient able to perform all sit to stand transfers with supervision, no overt loss of balance noted. Exercise/Education Provided:  Bed mobility  Body mechanics  Energy conservation  Functional activity tolerated  Gait training  Neuromuscular re-educate  Transfer training    Patient End of Session: Up in chair;Needs met;Call light within reach; All patient questions and concerns addressed;RN aware of session/findings    CURRENT GOALS  All PT related goals met during today's session.     Patient Evaluation Complexity Level:   History Moderate - 1 or 2 personal factors and/or co-morbidities   Examination of body systems Moderate - addressing a total of 3 or more elements   Clinical Presentation Moderate - Evolving   Clinical Decision Making Moderate Complexity     Gait Training: 15 minutes       Josh Chavez PT, DPT

## 2023-11-09 NOTE — CONSULTS
[facility]    PATIENT'S NAME: Lm Carlson   ATTENDING PHYSICIAN: Shai Cooper MD   CONSULTING PHYSICIAN: Lizette Dumont MD   PATIENT ACCOUNT#:   [id number]    LOCATION:  [address] [address] #:   [id number]       YOB: 1960  ADMISSION DATE:       11/08/2023      CONSULT DATE:  11/08/2023    REPORT OF CONSULTATION    REASON FOR CONSULTATION:  Lumbar spinal stenosis for interbody fusion. HISTORY OF PRESENT ILLNESS:  The patient is a 57-year-old,  male with known chronic back pain and neurogenic claudication, has been intractable and was evaluated by Dr. Shai Cooper from Neurosurgery/Spine Service. Patient had an MRI scan of the lumbar spine done on September 1, which showed severe spinal stenosis at L4-L5. Patient was scheduled today for surgical intervention. Postprocedure he was transferred to PACU for further monitoring. PAST MEDICAL HISTORY:  L4-L5 lumbar spinal stenosis with neurogenic claudication and left lower extremity radiculopathy. Hypertension, hyperlipidemia, benign prostatic hypertrophy, seronegative rheumatoid arthritis versus polymyalgia rheumatica. PAST SURGICAL HISTORY:  Right knee arthroscopic procedure. Lumbar microdiscectomy. MEDICATIONS:  Please see medication reconciliation list.    ALLERGIES:  No known drug allergies. SOCIAL HISTORY:  Ex-tobacco user. No current tobacco, alcohol or drug use. Lives with his family. Independent in his basic activities of daily living. FAMILY HISTORY:  Father had leukemia. Mother had heart disease. REVIEW OF SYSTEMS:  Currently lying in bed with back pain without radiation to his lower extremities. No chest pain, no shortness of breath. Other 12-point review of systems is negative. PHYSICAL EXAMINATION:    GENERAL:  Alert, oriented to time, place and person. moderate distress. VITAL SIGNS:  Temperature 97.7.   Pulse 77, respiratory rate 13, blood pressure 146/92, pulse ox 99% on room air. HEENT:  Atraumatic. Oropharynx clear. Moist mucous membranes. Ears, nose:  Normal.  Eyes:  Anicteric sclerae. NECK:  Supple. No lymphadenopathy. Trachea midline. Full range of motion. LUNGS:  Clear to auscultation bilaterally. Normal respiratory effort. HEART:  Regular rate and rhythm, S1, S2 auscultated. No murmur. ABDOMEN:  Soft, nondistended, no tenderness. Positive bowel sounds. EXTREMITIES:  No edema, clubbing or cyanosis. NEUROLOGIC:  Motor and sensory intact. BACK:  Lumbar area with surgical dressing. ASSESSMENT AND PLAN:    1. Lumbar spinal stenosis L4-L5 with neurogenic claudication status post left L4-L5 transforaminal interbody fusion. Pain control. Neuro checks. DVT prophylaxis. Physical and occupational therapy. 2.   Essential hypertension. Continue home medications and monitor. 3.   Benign prostatic hypertrophy. Continue home medications. 4.   Hyperlipidemia. Continue home medications.     Dictated By Tony Durham MD  d: 11/08/2023 18:17:14  t: 11/08/2023 18:26:33  Job [id number]  /

## 2023-11-09 NOTE — PLAN OF CARE
Patient is Aox4 on RA. POD 1. 1x w/ walker. Gel ice. SCDs. General diet. IVF infusing. Mary in place to be removed this AM. Norco and Robaxin given for pain. Plan for Home when med clear. Patient resting in recliner chair. 0454:  Mary removed - check void      Problem: Patient Centered Care  Goal: Patient preferences are identified and integrated in the patient's plan of care  Description: Interventions:  - What would you like us to know as we care for you?   - Provide timely, complete, and accurate information to patient/family  - Incorporate patient and family knowledge, values, beliefs, and cultural backgrounds into the planning and delivery of care  - Encourage patient/family to participate in care and decision-making at the level they choose  - Honor patient and family perspectives and choices  Outcome: Progressing     Problem: Patient/Family Goals  Goal: Patient/Family Long Term Goal  Description: Patient's Long Term Goal: Go home     Interventions:  - early ambulation  - See additional Care Plan goals for specific interventions  Outcome: Progressing  Goal: Patient/Family Short Term Goal  Description: Patient's Short Term Goal: No pain    Interventions:   - ice therapy  -pain med adherence  - repositioning  - See additional Care Plan goals for specific interventions  Outcome: Progressing     Problem: PAIN - ADULT  Goal: Verbalizes/displays adequate comfort level or patient's stated pain goal  Description: INTERVENTIONS:  - Encourage pt to monitor pain and request assistance  - Assess pain using appropriate pain scale  - Administer analgesics based on type and severity of pain and evaluate response  - Implement non-pharmacological measures as appropriate and evaluate response  - Consider cultural and social influences on pain and pain management  - Manage/alleviate anxiety  - Utilize distraction and/or relaxation techniques  - Monitor for opioid side effects  - Notify MD/LIP if interventions unsuccessful or patient reports new pain  - Anticipate increased pain with activity and pre-medicate as appropriate  Outcome: Progressing     Problem: SKIN/TISSUE INTEGRITY - ADULT  Goal: Skin integrity remains intact  Description: INTERVENTIONS  - Assess and document risk factors for pressure ulcer development  - Assess and document skin integrity  - Monitor for areas of redness and/or skin breakdown  - Initiate interventions, skin care algorithm/standards of care as needed  Outcome: Progressing  Goal: Incision(s), wounds(s) or drain site(s) healing without S/S of infection  Description: INTERVENTIONS:  - Assess and document risk factors for pressure ulcer development  - Assess and document skin integrity  - Assess and document dressing/incision, wound bed, drain sites and surrounding tissue  - Implement wound care per orders  - Initiate isolation precautions as appropriate  - Initiate Pressure Ulcer prevention bundle as indicated  Outcome: Progressing     Problem: MUSCULOSKELETAL - ADULT  Goal: Return mobility to safest level of function  Description: INTERVENTIONS:  - Assess patient stability and activity tolerance for standing, transferring and ambulating w/ or w/o assistive devices  - Assist with transfers and ambulation using safe patient handling equipment as needed  - Ensure adequate protection for wounds/incisions during mobilization  - Obtain PT/OT consults as needed  - Advance activity as appropriate  - Communicate ordered activity level and limitations with patient/family  Outcome: Progressing  Goal: Maintain proper alignment of affected body part  Description: INTERVENTIONS:  - Support and protect limb and body alignment per provider's orders  - Instruct and reinforce with patient and family use of appropriate assistive device and precautions (e.g. spinal or hip dislocation precautions)  Outcome: Progressing

## 2023-11-09 NOTE — PLAN OF CARE
POD 1, alert. Dressing CDI. Pain under control. RA. VSS. SCDs and frequent ambulation for DVT prophylaxis. Tolerating diet. Cleared from all services for dc home today. Discharge instructions reviewed, questions and concerns addressed.      Problem: Patient Centered Care  Goal: Patient preferences are identified and integrated in the patient's plan of care  Description: Interventions:  - What would you like us to know as we care for you?   - Provide timely, complete, and accurate information to patient/family  - Incorporate patient and family knowledge, values, beliefs, and cultural backgrounds into the planning and delivery of care  - Encourage patient/family to participate in care and decision-making at the level they choose  - Honor patient and family perspectives and choices  Outcome: Adequate for Discharge     Problem: Patient/Family Goals  Goal: Patient/Family Long Term Goal  Description: Patient's Long Term Goal:     Interventions:  -   - See additional Care Plan goals for specific interventions  Outcome: Adequate for Discharge  Goal: Patient/Family Short Term Goal  Description: Patient's Short Term Goal:     Interventions:   -   - See additional Care Plan goals for specific interventions  Outcome: Adequate for Discharge     Problem: PAIN - ADULT  Goal: Verbalizes/displays adequate comfort level or patient's stated pain goal  Description: INTERVENTIONS:  - Encourage pt to monitor pain and request assistance  - Assess pain using appropriate pain scale  - Administer analgesics based on type and severity of pain and evaluate response  - Implement non-pharmacological measures as appropriate and evaluate response  - Consider cultural and social influences on pain and pain management  - Manage/alleviate anxiety  - Utilize distraction and/or relaxation techniques  - Monitor for opioid side effects  - Notify MD/LIP if interventions unsuccessful or patient reports new pain  - Anticipate increased pain with activity and pre-medicate as appropriate  Outcome: Adequate for Discharge     Problem: SKIN/TISSUE INTEGRITY - ADULT  Goal: Skin integrity remains intact  Description: INTERVENTIONS  - Assess and document risk factors for pressure ulcer development  - Assess and document skin integrity  - Monitor for areas of redness and/or skin breakdown  - Initiate interventions, skin care algorithm/standards of care as needed  Outcome: Adequate for Discharge  Goal: Incision(s), wounds(s) or drain site(s) healing without S/S of infection  Description: INTERVENTIONS:  - Assess and document risk factors for pressure ulcer development  - Assess and document skin integrity  - Assess and document dressing/incision, wound bed, drain sites and surrounding tissue  - Implement wound care per orders  - Initiate isolation precautions as appropriate  - Initiate Pressure Ulcer prevention bundle as indicated  Outcome: Adequate for Discharge     Problem: MUSCULOSKELETAL - ADULT  Goal: Return mobility to safest level of function  Description: INTERVENTIONS:  - Assess patient stability and activity tolerance for standing, transferring and ambulating w/ or w/o assistive devices  - Assist with transfers and ambulation using safe patient handling equipment as needed  - Ensure adequate protection for wounds/incisions during mobilization  - Obtain PT/OT consults as needed  - Advance activity as appropriate  - Communicate ordered activity level and limitations with patient/family  Outcome: Adequate for Discharge  Goal: Maintain proper alignment of affected body part  Description: INTERVENTIONS:  - Support and protect limb and body alignment per provider's orders  - Instruct and reinforce with patient and family use of appropriate assistive device and precautions (e.g. spinal or hip dislocation precautions)  Outcome: Adequate for Discharge

## 2023-11-10 ENCOUNTER — PATIENT OUTREACH (OUTPATIENT)
Dept: CASE MANAGEMENT | Age: 63
End: 2023-11-10

## 2023-11-10 DIAGNOSIS — Z02.9 ENCOUNTERS FOR UNSPECIFIED ADMINISTRATIVE PURPOSE: ICD-10-CM

## 2023-11-10 DIAGNOSIS — Z98.1 S/P LUMBAR FUSION: Primary | ICD-10-CM

## 2023-11-10 PROCEDURE — 1111F DSCHRG MED/CURRENT MED MERGE: CPT

## 2023-11-10 NOTE — PROGRESS NOTES
Initial Post Discharge Follow Up   Discharge Date: 11/9/23  Contact Date: 11/10/2023    Consent Verification:  Assessment Completed With: Patient  HIPAA Verified? Yes    Discharge Dx:   Lumbar spinal stenosis L4-L5 with neurogenic claudication status post left L4-L5 transforaminal interbody fusion. Essential hypertension  Benign prostatic hypertrophy  Hyperlipidemia    General:   How have you been since your discharge from the hospital? The patient reported continued intermittent low back and left leg pain. The patient currently rated the pain at 4/10. The patient denies any redness, pain, edema, discharge/bleeding, foul odor or heat coming from the incision site. The incision was described as intact and soft to the touch. The patient also denies any malaise or fever. The patient denies any chest pain, trouble breathing, coughing, dizziness/syncope, irregular heart beat, or sweats. The patient denies any bilateral calf edema, pain, warmth, redness or tenderness. The patient did admit to bilateral lower leg edema after being in a reclining chair. The edema has since resolved. The patient has not checked BP since discharge. Do you have any pain since discharge? Yes  Where: low back and left leg pain    Rating on pain scale 1-10, 10 being the worst pain you have ever experienced, what is current pain: 4  Alleviating factors: ice  Aggravating factors: sitting for a long period of time  Is the pain manageable at home? Yes  How well was your pain managed while in the hospital?   On a scale of 1-5   1- Very Poor and 5- Very well   Very Well  When you were leaving the hospital were your discharge instructions reviewed with you? Yes  How well were your discharge instructions explained to you? On a scale of 1-5   1- Very Poor and 5- Very well   Very Well  Do you have any questions about your discharge instructions? No; [facility] did review the following in detail:   DISCHARGE INSTRUCTIONS PER DR. Flores [facility]  Wound Care:   No dressing on the incision necessary. Dab dry the incision after shower/cleaning. No tub baths until first post-op follow-up. Activity:  Resume activity as tolerated. Walking as much as possible is highly encouraged. Limit bending, lifting and twisting. Do not drive while taking narcotic pain medications. Post-Op Medications:  Medications sent to the pharmacy. Take medications as directed. Continue icing of the area of the incision for 30 minutes at a time, multiple times a day. If a cooling device (e.g. Polar Care) has been provided, utilize as directed per nursing staff. Diet:  Advance diet as tolerated. Before leaving the hospital was your diagnoses explained to you? Yes  Do you have any questions about your diagnoses? No  Are you able to perform normal daily activities of living as you have prior to your hospital stay (dressing, bathing, ambulating to the bathroom, etc)? yes NCM did review/stress the importance of fall precautions.   (NCM) Was patient given a different diet per AVS? no      Medications:   Current Outpatient Medications   Medication Sig Dispense Refill    HYDROcodone-acetaminophen 5-325 MG Oral Tab Take 1 tablet by mouth every 6 (six) hours as needed for Pain. 28 tablet 0    methocarbamol 750 MG Oral Tab Take 1 tablet (750 mg total) by mouth 3 (three) times daily for 10 days. 30 tablet 0    PARoxetine 10 MG Oral Tab Take 1 tablet (10 mg total) by mouth every morning. tamsulosin 0.4 MG Oral Cap Take 1 capsule (0.4 mg total) by mouth daily. Take 1/2 hour following the same meal each day 90 capsule 3    rosuvastatin 10 MG Oral Tab Take 1 tablet (10 mg total) by mouth nightly. 90 tablet 1    losartan 25 MG Oral Tab Take 1 tablet (25 mg total) by mouth daily.  (Patient taking differently: Take 1 tablet (25 mg total) by mouth at bedtime.) 90 tablet 3     Were there any changes to your current medication(s) noted on the AVS? Yes  If so, were these medication changes discussed with you prior to leaving the hospital? Yes  If a new medication was prescribed:    Was the new medication's purpose & side effects reviewed? Yes  Do you have any questions about your new medication? No  Did you  your discharge medications when you left the hospital? Yes  Let's go over your medications together to make sure we are not missing anything. Medications Reviewed  Are there any reasons that keep you from taking your medication as prescribed? No  Are you having any concerns with constipation? No; The patient is taking a stool softener OTC. Did patient receive their flu shot (Sept-March)? Yes    Discharge medications reviewed/discussed/and reconciled against outpatient medications with patient. Any changes or updates to medications sent to PCP. Patient Acknowledged     Referrals/orders at D/C:  Referrals/orders placed at D/C? no  Except for [facility] [place] [place] mentioned above, have you scheduled these other services? No    DME ordered at D/C? No; NCM encouraged the patient to continue deep breathing exercises at home as directed. The patient has a cane at home. Discharge orders, AVS reviewed and discussed with patient. Any changes or updates to orders sent to PCP. Patient Acknowledged      SDOH:   Transportation:   Transportation Needs: No Transportation Needs (11/10/2023)    Transportation Needs     Lack of Transportation: No   Financial Strain:   Financial Resource Strain: Low Risk  (11/10/2023)    Financial Resource Strain     Difficulty of Paying Living Expenses: Not hard at all     Med Affordability: No         Diagnosis specifics:   Ortho Spine:  Has there been a change in your incision/wound since d/c?  no  Are you following your exercise program (exercises for total joints and walking only for spine surgery)  yes   When you were in the hospital after your surgery, were you offered anything other than medications to help with your pain management?   yes  Do you remember what this might have been?         (put X by all the patient mentions:           repositioning            sleep/relaxation kit              music           TV         I-pad                       reading           X  Cold-pack      other__________________________)   Did you understand when to take your pain medication at home? yes  On a scale of 1-5   1- Very Poor and 5- Very well   Very well  Comments:  Was it clear how to use less narcotic pain medication as your pain decreased at home? yes   On a scale of 1-5   1- Very Poor/unclear and 5- Very well/clear  Very well  Comments:  Were you satisfied with the discharge process? yes  (For SPINE PATIENTS only)      Did your surgeon order a brace or cervical collar for you after surgery? no    GENERAL:   Were you able to participate in any of the following educational opportunities:   Pre op class in person no   Pre op class online no   Discharge class in person no   Discharge video  no      Follow up appointments:      TCC  Was TCC ordered: No    PCP (If no TCC appointment)  Does patient already have a PCP appointment scheduled? No  NCM Scheduled PCP office TCM appointment with patient on 11/21/2023 with Dr. Lisa Finch. Specialist    Does the patient have any other follow up appointment(s) needing to be scheduled? Yes  If yes: NCM reviewed upcoming specialist appointment with patient: Yes  The patient will be scheduling with the following provider:   Schedule an appointment with Ofe Matos as soon  as possible for a visit in 2 week(s)  Specialty: Physician Assistant  For wound re-check  Vandana Vail  Neurosurgey  [address] [facility] [address]    Does the patient need assistance scheduling appointment(s): No    Is there any reason as to why you cannot make your appointment(s)?   No     Needs post D/C:   Now that you are home, are there any needs or concerns you need addressed before your next visit with your PCP?  (DME, meds, questions, etc.): No    Interventions by NCM:    Reviewed all discharge instructions with the patient with a focus on post-op directions/restrictions, wound care and pain management. All medications were reviewed and educated on the importance of taking the medications as directed. Patient symptoms were assessed, education was completed for signs/symptoms to monitor, and reviewed when to contact providers vs go to the ED/call 911. Appointments were reviewed and stressed the importance of a close follow up with providers. A TCM-HFU appointment was scheduled. The patient received the influenza vaccine this season. The patient verbalized understanding and will contact the office with any further questions or concerns. Overall Rating:    How would you rate the care you received while in the hospital? good    CCM referral placed:    No    BOOK BY DATE: 11/23/2023

## 2023-11-11 NOTE — PROGRESS NOTES
Deanne Josue is a 56year old male. HPI:   Patient presents with:   Follow - Up  Shoulder Pain  Hand Pain        Presents for f/u of PMR vs Seronegative RA    Current Medications:  Prednisone 7.5 mg daily- started 3/2020  Leflunomide 20 mg daily- star History:   Diagnosis Date   • Back problem    • Broken neck ([facility] [address]    • Rheumatoid arteritis ([facility] [address]       Social Hx Reviewed   Family Hx Reviewed     Medications (Active prior to today's visit):  Current Outpatient Medications   Medication Sig Dispense Refil SED RATE      0 - 20 mm/Hr 9      Imaging:     US R hand 3/2020:    Mild synovial thickening is seen at the thumb carpometacarpal joint and at the thumb and index finger MCP joints without hyperemia to suggest inflammatory arthritis.   Findings are most l Yes

## 2023-11-13 NOTE — PAYOR COMM NOTE
Discharge Notification    Patient Name: Raoul Salcido MA O  Subscriber #: [id number]  Authorization Number: [id number]  Admit Date/Time: 11/8/2023 12:33 PM  Discharge Date/Time: 11/9/2023 12:23 PM

## 2023-11-20 RX ORDER — PREDNISONE 10 MG/1
TABLET ORAL
Qty: 28 TABLET | Refills: 0 | OUTPATIENT
Start: 2023-11-20

## 2023-11-20 RX ORDER — CYCLOBENZAPRINE HCL 10 MG
10 TABLET ORAL NIGHTLY
Qty: 30 TABLET | Refills: 0 | OUTPATIENT
Start: 2023-11-20

## 2023-12-02 ENCOUNTER — MED REC SCAN ONLY (OUTPATIENT)
Dept: FAMILY MEDICINE CLINIC | Facility: CLINIC | Age: 63
End: 2023-12-02

## 2024-01-03 ENCOUNTER — MED REC SCAN ONLY (OUTPATIENT)
Dept: FAMILY MEDICINE CLINIC | Facility: CLINIC | Age: 64
End: 2024-01-03

## 2024-01-25 ENCOUNTER — PATIENT MESSAGE (OUTPATIENT)
Dept: RHEUMATOLOGY | Facility: CLINIC | Age: 64
End: 2024-01-25

## 2024-01-25 DIAGNOSIS — M19.90 INFLAMMATORY ARTHROPATHY: Primary | ICD-10-CM

## 2024-01-25 NOTE — TELEPHONE ENCOUNTER
From: Valdez Rose  To: Ayah Gregory  Sent: 1/25/2024 10:23 AM CST  Subject: Muscle Fatigue    For the last month I have been experiencing more muscle fatigue in my arms and legs. I presently am not on any medication for my RA. Unsure if I have a standing order for blood work? Unsure if I should begin taking meds again? Please advise. Thank you,

## 2024-01-26 NOTE — TELEPHONE ENCOUNTER
If you can let him know his blood work was done in October with mildly elevated CRP.  I can place new orders for blood work.  RA would cause more joint pain and swelling not really muscle weakness.    If he can make a follow-up appointment.

## 2024-01-26 NOTE — TELEPHONE ENCOUNTER
Phoned pt; verified name and . Informed him Dr Gregory has put in new lab orders for pt to get blood work as last done in Oct 2023; pt states he will go next week. Discussed with pt that RA would cause joint pain and swelling not muscle fatigue. Pt acknowledged that what he is feeling is different' describes muscles of biceps and thighs sore as if he had worked out. Denies joint pain or swelling. Also informed pt Dr Gregory recommends he make a follow-up appt with her; pt states he will do this himself via [place].

## 2024-01-30 ENCOUNTER — HOSPITAL ENCOUNTER (OUTPATIENT)
Dept: GENERAL RADIOLOGY | Age: 64
Discharge: HOME | End: 2024-01-30
Attending: PHYSICIAN ASSISTANT
Payer: MEDICARE

## 2024-01-30 ENCOUNTER — LAB ENCOUNTER (OUTPATIENT)
Dept: LAB | Age: 64
End: 2024-01-30
Attending: PHYSICIAN ASSISTANT
Payer: MEDICARE

## 2024-01-30 DIAGNOSIS — M43.16 SPONDYLOLISTHESIS OF LUMBAR REGION: ICD-10-CM

## 2024-01-30 DIAGNOSIS — M48.062 LUMBAR STENOSIS WITH NEUROGENIC CLAUDICATION: ICD-10-CM

## 2024-01-30 DIAGNOSIS — M19.90 INFLAMMATORY ARTHROPATHY: ICD-10-CM

## 2024-01-30 DIAGNOSIS — M47.816 LUMBAR SPONDYLOSIS: ICD-10-CM

## 2024-01-30 DIAGNOSIS — M51.26 LUMBAR HERNIATED DISC: ICD-10-CM

## 2024-01-30 DIAGNOSIS — Z98.1 S/P LUMBAR FUSION: ICD-10-CM

## 2024-01-30 LAB
CK SERPL-CCNC: 74 U/L
CRP SERPL-MCNC: <0.4 MG/DL (ref ?–1)
ERYTHROCYTE [SEDIMENTATION RATE] IN BLOOD: 4 MM/HR
RHEUMATOID FACT SERPL-ACNC: <10 IU/ML (ref ?–14)

## 2024-01-30 PROCEDURE — 85652 RBC SED RATE AUTOMATED: CPT | Performed by: INTERNAL MEDICINE

## 2024-01-30 PROCEDURE — 72100 X-RAY EXAM L-S SPINE 2/3 VWS: CPT | Performed by: PHYSICIAN ASSISTANT

## 2024-01-30 PROCEDURE — 86140 C-REACTIVE PROTEIN: CPT | Performed by: INTERNAL MEDICINE

## 2024-01-30 PROCEDURE — 86431 RHEUMATOID FACTOR QUANT: CPT | Performed by: INTERNAL MEDICINE

## 2024-01-30 PROCEDURE — 86200 CCP ANTIBODY: CPT | Performed by: INTERNAL MEDICINE

## 2024-01-30 PROCEDURE — 36415 COLL VENOUS BLD VENIPUNCTURE: CPT | Performed by: INTERNAL MEDICINE

## 2024-01-30 PROCEDURE — 82550 ASSAY OF CK (CPK): CPT

## 2024-01-31 LAB — CCP IGG SERPL-ACNC: 0.7 U/ML (ref 0–6.9)

## 2024-02-08 ENCOUNTER — OFFICE VISIT (OUTPATIENT)
Facility: CLINIC | Age: 64
End: 2024-02-08
Payer: MEDICARE

## 2024-02-08 DIAGNOSIS — Z98.1 S/P LUMBAR FUSION: Primary | ICD-10-CM

## 2024-02-08 PROCEDURE — 99214 OFFICE O/P EST MOD 30 MIN: CPT | Performed by: NEUROLOGICAL SURGERY

## 2024-02-08 NOTE — PROGRESS NOTES
[facility] Neurosurgery        [facility]      [address]  [address]  [place] [address]    PHONE  [phone]          FAX  [phone]    [email or web address]      OFFICE FOLLOW-UP NOTE            Valdez Rose    : 1960    MRN: [id number]  CSN: [id number]      PCP: Alejandro Stuart DO  Referring Provider: No ref. provider found    Insurance: Payor: [employer] [facility] / Plan: [employer] [place] HMO / Product Type: Medicare /           Date of Visit: 2024    Reason for Visit:   Chief Complaint    Follow - Up                         History of Present Care:  Valdez Rose is a a(n) 63 year old, male presents to the office 3 months status post L4-5 interbody fusion with pedicular screws and rods.  Patient is feeling much better than he was prior to intervention.  He continues to have occasional low back pain and left buttocks pain which radiates down to the knee.  He has been using ibuprofen somewhat regularly for the past month for these pains.  He has initiated his own exercise program and feels he is making steady progress.  He denies lower extremity weakness, numbness, tingling.  He has had no changes in his bowel or bladder.     History:    Past Medical History:   Diagnosis Date    Anxiety     Back problem     Broken neck (HCC)     Chronic pain 2017    Essential hypertension 2023    High blood pressure     High cholesterol     Rheumatoid arteritis (HCC)       Past Surgical History:   Procedure Laterality Date    COLONOSCOPY N/A 02/10/2020    Procedure: COLONOSCOPY;  Surgeon: Rafael Saldana MD;  Location: [facility] ENDOSCOPY    KNEE ARTHROSCOPY      Right knee arthroscopy    OTHER SURGICAL HISTORY      Arthrocentesis of the right knee joint    SPINE SURGERY PROCEDURE UNLISTED      SURGERY - EXTERNAL      LUMBAR MICRODISCECTOMY      Family History   Problem Relation Age of Onset    Other (pulmo) Father 75        leukemnia     Arthritis Mother     Heart Disorder Mother     Cancer Sister     Heart Disorder Sister     Heart Disorder Brother       Social History     Socioeconomic History    Marital status:      Spouse name: Not on file    Number of children: Not on file    Years of education: Not on file    Highest education level: Not on file   Occupational History    Not on file   Tobacco Use    Smoking status: Former     Packs/day: 0.50     Years: 20.00     Additional pack years: 0.00     Total pack years: 10.00     Types: Cigarettes     Quit date: 2016     Years since quittin.1    Smokeless tobacco: Never    Tobacco comments:     I want a lung scan my sister  of Lung Cancer.   Vaping Use    Vaping Use: Never used   Substance and Sexual Activity    Alcohol use: Not Currently     Alcohol/week: 2.0 standard drinks of alcohol     Types: 2 Standard drinks or equivalent per week     Comment: social    Drug use: Never    Sexual activity: Not on file   Other Topics Concern     Service Not Asked    Blood Transfusions Not Asked    Caffeine Concern Yes     Comment: Coffee, 4 cups daily.    Occupational Exposure Not Asked    Hobby Hazards Not Asked    Sleep Concern Not Asked    Stress Concern Not Asked    Weight Concern Not Asked    Special Diet Not Asked    Back Care Not Asked    Exercise Not Asked    Bike Helmet Not Asked    Seat Belt Not Asked    Self-Exams Not Asked    Left Handed No    Right Handed Yes    Currently spends a great deal of time in the sun No    Past Sunlamp Treatments for Acne Not Asked    History of tanning No    Hx of Spending Great Deal of Time in Sun No    Bad sunburns in the past No    Tanning Salons in the Past No    Hx of Radiation Treatments No    Regular use of sun block Not Asked   Social History Narrative    Not on file     Social Determinants of Health     Financial Resource Strain: Low Risk  (11/10/2023)    Financial Resource Strain     Difficulty of Paying Living Expenses: Not hard at  all     Med Affordability: No   Food Insecurity: No Food Insecurity (11/8/2023)    Food Insecurity     Food Insecurity: Never true   Transportation Needs: No Transportation Needs (11/10/2023)    Transportation Needs     Lack of Transportation: No   Physical Activity: Not on file   Stress: Not on file   Social Connections: Not on file   Housing Stability: Low Risk  (11/8/2023)    Housing Stability     Housing Instability: No     Housing Instability Emergency: Not on file        Allergies:  Allergies   Allergen Reactions    Other ANAPHYLAXIS     Bee Sting          Medications:  Current Outpatient Medications   Medication Sig Dispense Refill    PARoxetine 10 MG Oral Tab Take 1 tablet (10 mg total) by mouth every morning.      tamsulosin 0.4 MG Oral Cap Take 1 capsule (0.4 mg total) by mouth daily. Take 1/2 hour following the same meal each day 90 capsule 3    rosuvastatin 10 MG Oral Tab Take 1 tablet (10 mg total) by mouth nightly. 90 tablet 1    losartan 25 MG Oral Tab Take 1 tablet (25 mg total) by mouth daily. (Patient taking differently: Take 1 tablet (25 mg total) by mouth at bedtime.) 90 tablet 3        Review of Systems:  A 10-point system was reviewed.  Pertinent positives and negatives are noted in HPI.      Physical Exam:  There were no vitals taken for this visit.        Neurological Exam:    Motor Strength:  Strength in the lower extremities is 5 out of 5 throughout  Gait is within normal limits    Sensation to light touch:  Intact in bilateral lower extremities throughout    DTRs:  Hypoactive in bilateral patellar  Diminished in bilateral Achilles      Abdomen:  Soft, non-distended, non-tender, with no rebound or guarding.  No peritoneal signs.     Extremities:  Non-tender, no lower extremity edema noted.      Labs:  CBC:  Lab Results   Component Value Date    WBC 4.6 10/09/2023    HGB 15.0 10/09/2023    HCT 46.0 10/09/2023    MCV 80.8 10/09/2023    .0 10/09/2023      BMG:  Lab Results   Component  Value Date     (L) 10/09/2023    K 4.5 10/09/2023    CO2 27.0 10/09/2023     10/09/2023    BUN 22 (H) 10/09/2023      INR:  Lab Results   Component Value Date    INR 0.98 10/09/2023    INR 0.97 09/08/2021          Diagnostics:  X-ray lumbar spine dated 1/30/2024 reviewed:  There is evidence of an L4-5 interbody fusion with pedicular screws and rods.  This appears grossly stable and without complication     Diagnosis:  1. S/P lumbar fusion  - X-RAY LUMBAR SPINE 2 VW; Future      Assessment/Plan:  Valdez oRse presents to the office 3 months status post L4-5 interbody fusion.  Patient is making steady progress and has initiated his own exercise program.  He has been occasionally using ibuprofen for the past month and we have advised him to discontinue the use of this medication.  We would prefer patient use Tylenol as needed for his discomfort.  He may also continue to use the muscle relaxer that he has left.  We discussed him beginning strenuous exercise including golfing.  We would like him to initiate this process slowly and if it bothers him to stop.  Will provide patient with an x-ray of the lumbar spine to be completed 3 months from now and we will see him back in the office for reevaluation.      More than 30 minutes were spent during this visit and the coordination of this patient's care. All questions and concerns were addressed. We appreciate the opportunity to participate in the care of this patient. Please do not hesitate to call our office ([phone]) with any issues.    This note has been dictated utilizing voice recognition software. Unfortunately, this may lead to occasional typographical errors. If there are any questions regarding this, please do not hesitate to contact our office.         Seamus Staples MD    2/8/2024  10:22 AM

## 2024-02-08 NOTE — PROGRESS NOTES
Patient is here for 3 month post op apt.  He had a Left L4-5 TLIF on 11-8-23.   He states he feels better than before surgery.  He occasionally still has some left hip and leg pain.  He had an x-ray last week and is here for review the results.

## 2024-02-08 NOTE — PATIENT INSTRUCTIONS
Refill policies:    Allow 2-3 business days for refills; controlled substances may take longer.  Contact your pharmacy at least 5 days prior to running out of medication and have them send an electronic request or submit request through the “request refill” option in your "[employer] account.  Refills are not addressed on weekends; covering physicians do not authorize routine medications on weekends.  No narcotics or controlled substances are refilled after noon on Fridays or by on call physicians.  By law, narcotics must be electronically prescribed.  A 30 day supply with no refills is the maximum allowed.  If your prescription is due for a refill, you may be due for a follow up appointment.  To best provide you care, patients receiving routine medications need to be seen at least once a year.  Patients receiving narcotic/controlled substance medications need to be seen at least once every 3 months.  In the event that your preferred pharmacy does not have the requested medication in stock (e.g. Backordered), it is your responsibility to find another pharmacy that has the requested medication available.  We will gladly send a new prescription to that pharmacy at your request.    Scheduling Tests:    If your physician has ordered radiology tests such as MRI or CT scans, please contact Central Scheduling at [phone] right away to schedule the test.  Once scheduled, the [facility] Centralized Referral Team will work with your insurance carrier to obtain pre-certification or prior authorization.  Depending on your insurance carrier, approval may take 3-10 days.  It is highly recommended patients assure they have received an authorization before having a test performed.  If test is done without insurance authorization, patient may be responsible for the entire amount billed.      Precertification and Prior Authorizations:  If your physician has recommended that you have a procedure or additional testing performed the [facility] hospital  Centralized Referral Team will contact your insurance carrier to obtain pre-certification or prior authorization.    You are strongly encouraged to contact your insurance carrier to verify that your procedure/test has been approved and is a COVERED benefit.  Although the [facility] Centralized Referral Team does its due diligence, the insurance carrier gives the disclaimer that \"Although the procedure is authorized, this does not guarantee payment.\"    Ultimately the patient is responsible for payment.   Thank you for your understanding in this matter.  Paperwork Completion:  If you require FMLA or disability paperwork for your recovery, please make sure to either drop it off or have it faxed to our office at [phone]. Be sure the form has your name and date of birth on it.  The form will be faxed to our Forms Department and they will complete it for you.  There is a 25$ fee for all forms that need to be filled out.  Please be aware there is a 10-14 day turnaround time.  You will need to sign a release of information (MARIA ANTONIA) form if your paperwork does not come with one.  You may call the Forms Department with any questions at [phone].  Their fax number is [phone].

## 2024-02-16 RX ORDER — ROSUVASTATIN CALCIUM 10 MG/1
10 TABLET, COATED ORAL NIGHTLY
Qty: 90 TABLET | Refills: 3 | Status: SHIPPED | OUTPATIENT
Start: 2024-02-16

## 2024-02-16 NOTE — TELEPHONE ENCOUNTER
Refill passed per [facility] protocol.  Requested Prescriptions   Pending Prescriptions Disp Refills    ROSUVASTATIN 10 MG Oral Tab [Pharmacy Med Name: ROSUVASTATIN 10MG TABLETS] 90 tablet 1     Sig: TAKE 1 TABLET BY MOUTH EVERY NIGHT       Cholesterol Medication Protocol Passed - 2/16/2024  6:00 AM        Passed - ALT < 80     Lab Results   Component Value Date    ALT 35 10/09/2023             Passed - ALT resulted within past year        Passed - Lipid panel within past 12 months     Lab Results   Component Value Date    CHOLEST 209 (H) 06/29/2023    TRIG 35 06/29/2023    HDL 59 06/29/2023     (H) 06/29/2023    VLDL 6 06/29/2023    NONHDLC 150 (H) 06/29/2023             Passed - In person appointment or virtual visit in the past 12 mos or appointment in next 3 mos     Recent Outpatient Visits              1 week ago S/P lumbar fusion    [facility] [place] Boco, Tibor, MD    Office Visit    3 months ago Elevated PSA    [facility], Olena Ca MD    Office Visit    4 months ago Lumbar disc herniation    [facility] [place] Cespedes, David B, DO    Office Visit    5 months ago BPH with obstruction/lower urinary tract symptoms    [facility]    Office Visit    5 months ago Lumbar radiculopathy    [facility], Marcos Sood MD    Office Visit                         Recent Outpatient Visits              1 week ago S/P lumbar fusion    [facility] [place] Boco, Tibor, MD    Office Visit    3 months ago [facility]vated PSA    [facility]    Office Visit    4 months ago Lumbar disc herniation    [facility] [place] Cespedes, David B, DO    Office Visit    5 months ago BPH with  obstruction/lower urinary tract symptoms    [facility], Olena Ca MD    Office Visit    5 months ago Lumbar radiculopathy    [facility], Marcos Sood MD    Office Visit

## 2024-02-23 ENCOUNTER — TELEPHONE (OUTPATIENT)
Dept: FAMILY MEDICINE CLINIC | Facility: CLINIC | Age: 64
End: 2024-02-23

## 2024-03-12 ENCOUNTER — TELEPHONE (OUTPATIENT)
Dept: RHEUMATOLOGY | Facility: CLINIC | Age: 64
End: 2024-03-12

## 2024-03-12 NOTE — TELEPHONE ENCOUNTER
Patient's spouse called to schedule an appointment and to request a refill on below medication. Medication was not in med. List. Please send to below [facility], patient and spouse are in [place]. Patients spouse states below medication has really helped him and will like to continue on it.     [facility]  [address]  [address].    [phone]    Leflunomide

## 2024-03-12 NOTE — TELEPHONE ENCOUNTER
Returned call; spoke to pt and wife via speaker phone. [place]arily in [place] due to birth of [place]child.    Pt reports stopping Leflunomide in Aug or Sept 2023; states this was discussed with Dr Gregory at last office visit on 4/26/23. Then pt had spinal fusion surgery in November; was on pain meds afterwards but off for a couple of months now.     Pt reports pain coming on for the last 2 months; wondering if there all along but not aware due to post-op pain meds. Pain mainly in bilateral hands and shoulders. Has joint swelling. Hands feel very tight. Pain this AM 10/10. Feels as bad as when first starting seeing Dr Gregory. Reports has been taking Extra Strength Tylenol - 5 tabs per day and Advil - 2 tabs per day with some relief.    Asking if she be back on Leflunomide and if need steroids.      Please send med orders to:  [facility]  [address]  [address].     [phone]   SUBJECTIVE: Martine is here for a 6-week postpartum checkup.    Delivery date was 2019. She had a  at 35w2d of a viable boy, weight 6 pounds 6 oz., with no complications.  Since delivery, she has been breast feeding.  She has no signs of infection, bleeding or other complications.  She is not pregnant.  We discussed contraceptions and she has chosen minipill.  She  has not had intercourse since delivery and complains of moderate discomfort. Patient screened for postpartum depression and complaints are NEGATIVE. Screening has also been completed for intimate partner violence.  Pap-WNL 18.    EXAM:  Today's Depression Rating was   PHQ-9 SCORE 10/10/2019   PHQ-9 Total Score 11       Abdomen- Abdomen soft, non-tender. BS normal. No masses, organomegaly  Pelvic- Exam chaperoned by nurse, External genitalia normal, Bartholin's glands normal, [place]'s glands normal, Urethral meatus normal, Urethra normal, Bladder normal, Vagina with normal rugae, no abnormal lesions, no abnormal discharge, Normal cervix without lesions or mucopus, no cervical motion tenderness, Uterus normal size, shape, and contour, no masses, non-tender, Adnexa normal size without masses or tenderness bilaterally, Anus normal      ASSESSMENT:   Encounter Diagnoses   Name Primary?     Postpartum care following vaginal delivery Yes     Oral contraceptive pill surveillance          PLAN:  1)  Rx Micronor given.  Reviewed how to take and side effects.  2)  Return as needed or at time of next expected pelvic, or breast exam.  3)  Next Pap/HPV co-test due .      Bryan Hawk MD

## 2024-03-13 ENCOUNTER — PATIENT MESSAGE (OUTPATIENT)
Dept: RHEUMATOLOGY | Facility: CLINIC | Age: 64
End: 2024-03-13

## 2024-03-13 RX ORDER — PREDNISONE 10 MG/1
TABLET ORAL
Qty: 20 TABLET | Refills: 0 | Status: SHIPPED | OUTPATIENT
Start: 2024-03-13

## 2024-03-13 RX ORDER — LEFLUNOMIDE 20 MG/1
20 TABLET ORAL DAILY
Qty: 90 TABLET | Refills: 0 | Status: SHIPPED | OUTPATIENT
Start: 2024-03-13 | End: 2024-03-14

## 2024-03-13 NOTE — TELEPHONE ENCOUNTER
I will send him a prednisone taper and we can restart leflunomide 20 mg daily    If he can make a follow-up visit.  We can add him in one of the consult spots.

## 2024-03-13 NOTE — TELEPHONE ENCOUNTER
HIPAA reviewed. Name and  verified with spouse. She is aware of provider's directions below and scripts sent to the pharmacy. Directions reviewed on prednisone burst; spouse verbalized understanding.       Future Appointments   Date Time Provider Department Center   3/20/2024  2:40 PM [facility]   7/3/2024 12:00 PM [facility]

## 2024-03-13 NOTE — TELEPHONE ENCOUNTER
Patients spouse called again about this stating she has not received a callback. I informed her that she will get a callback about the medication information. I did schedule him for an appt on 03/20/2024 as it was an open time slot.

## 2024-03-14 RX ORDER — LEFLUNOMIDE 20 MG/1
20 TABLET ORAL DAILY
Qty: 90 TABLET | Refills: 0 | Status: SHIPPED | OUTPATIENT
Start: 2024-03-14

## 2024-03-14 NOTE — TELEPHONE ENCOUNTER
From: Valdez Rose  To: Ayah Khan  Sent: 3/13/2024 9:25 PM CDT  Subject: Prescriptions for lefunomide    Can you please send my prescription for leflunomide to [facility] [address] [place] [address]. # [phone]. Jonathan will not have it for days. Thank you and sorry for inconvenience.

## 2024-03-20 ENCOUNTER — OFFICE VISIT (OUTPATIENT)
Dept: RHEUMATOLOGY | Facility: CLINIC | Age: 64
End: 2024-03-20
Payer: MEDICARE

## 2024-03-20 VITALS
SYSTOLIC BLOOD PRESSURE: 154 MMHG | HEIGHT: 66 IN | BODY MASS INDEX: 33.91 KG/M2 | WEIGHT: 211 LBS | HEART RATE: 78 BPM | DIASTOLIC BLOOD PRESSURE: 93 MMHG

## 2024-03-20 DIAGNOSIS — Z51.81 MEDICATION MONITORING ENCOUNTER: ICD-10-CM

## 2024-03-20 DIAGNOSIS — M06.09 RHEUMATOID ARTHRITIS OF MULTIPLE SITES WITH NEGATIVE RHEUMATOID FACTOR (HCC): Primary | ICD-10-CM

## 2024-03-20 PROCEDURE — 3077F SYST BP >= 140 MM HG: CPT | Performed by: INTERNAL MEDICINE

## 2024-03-20 PROCEDURE — 3080F DIAST BP >= 90 MM HG: CPT | Performed by: INTERNAL MEDICINE

## 2024-03-20 PROCEDURE — 3008F BODY MASS INDEX DOCD: CPT | Performed by: INTERNAL MEDICINE

## 2024-03-20 PROCEDURE — 99214 OFFICE O/P EST MOD 30 MIN: CPT | Performed by: INTERNAL MEDICINE

## 2024-03-20 RX ORDER — PAROXETINE HYDROCHLORIDE 20 MG/1
20 TABLET, FILM COATED ORAL EVERY MORNING
COMMUNITY
Start: 2024-02-16

## 2024-03-20 NOTE — PATIENT INSTRUCTIONS
You were seen today for rheumatoid arthritis  Continue leflunomide 20 mg daily  If your symptoms come back please let me know we can add low-dose prednisone  Blood work in the next 2 or 3 months, is already ordered  Make an appointment for July

## 2024-03-20 NOTE — PROGRESS NOTES
Valdez Rose is a 63 year old male.    HPI:     Chief Complaint   Patient presents with    Follow - Up         Presents today 3/20/2024 for f/u of PMR vs Seronegative RA    Current Medications:  Leflunomide 20 mg daily- started June 2020- Sept 2023, restarted 3/2024  Blood work:  Neg SAPPHIRE, RF, CCP, ESR, CRP 0.78  Synovial fluid 2011 cell count 1700  US R hand: no synovitis    Interval History:  This is a 61 yo M with hx of neck injury from a skiing accident about 20 years ago presents with polyarthralgias.  He has chronic neck pain due to a skiing injury and has had epidural injections into his neck.  He was also seen by Dr. Lance back in 2011 and diagnosed with seronegative RA.  At that time he presented with R knee pain and swelling, synovial fluid showed cell count of 1700 with no crystals.  He was also found to have elevated CRP of 70.  He was eventually placed on methotrexate which eventually did not work and then leflunomide.  He has not seen Dr. Lance since 2013.  He now presents with diffuse joint pain involving his hands, wrists, shoulders, knees.  He also reports bilateral shoulder and hip girdle stiffness.  He reports morning stiffness for more than 2 to 4 hours.  It is hard for him to raise his arms or get up from a seated position.  He also has numbness and tingling in his right hand.  Denies any history of psoriasis, uveitis, dactylitis, enthesitis, GI or  symptoms.  He currently is taking Advil 4 pills daily as needed.  He was prescribed prednisone back in November due to his back pain and all his symptoms improved.    9/14/2021  Presents for f/u  Seronegative RA  He initially presented with bilateral shoulder and hip girdle stiffness.  Thought was possible PMR.  He then also had pain and swelling in his hands wrists and knees therefore it seems like it progressed to seronegative RA  Initially he was treated with prednisone but it has been weaned off now  Now on leflunomide 20 mg daily for  the past year  Reports some mild stiffness in his hand but overall doing well.  No swelling.  Able to make a full fist  He is been having worsening lower back pain with radiculopathy symptoms down his left leg.  He has had 2 epidural injections but they did not help.  He will be following with neurosurgeon Dr. Staples and planning for surgery in October    1/15/2022:  Presents for f/u  Seronegative RA  Currently on leflunomide 20 mg daily  Most recent blood work reviewed and normal inflammatory markers  Had back surgery back in October, feels 50% better  Having some left elbow pain mostly in the lateral epicondyle region.     10/10/2022:  Presents for f/u  Seronegative RA  Currently on leflunomide 20 mg daily  Labs reviewed from September, normal kidney and liver test, normal ESR and CRP  Reports that his joints are doing okay.  Will have some stiffness in his hands but no swelling.  No pain in his shoulders  Continues to have lower back pain intermittently.  He golfs every Sunday with his brother which does make his back pain worse.  Continues to work with his hands a lot.    4/26/2023:  Presents for f/u  Seronegative RA  Currently on leflunomide 20 mg daily  He was taking every other day but over the last week has been taking it daily.  This is due to pain in his left calf region in the gastrocnemius muscle.  Also worsening lower back pain going down his legs.  The symptoms have been ongoing for the past 2 or 3 months  He was seen by Dr. Pulido, states he has a gastrocnemius muscle strain and lumbar radiculopathy  He gave him a steroid pack, he has not taken it yet  No joint pain or swelling.  Joints are doing really well  Has some numbness in his right fourth and fifth fingers mostly on the tips.  Has been going on for the past 2 or 3 months.  No pain in his elbows.    3/20/2024:  Presents for f/u  Seronegative RA  He was on leflunomide but stopped it around Sept 2023 as he was doing well.  He was also getting medrol  dose packs for the lower back pain until he had back surgery  Had lumbar fusion November 2023 and was doing well. He was on pain medications after the surgery but has been off of it now  Over the past 6 mos had significant pain in the arms and legs, more in the proximal upper arms and also the thigh/hip regions. It was hard to get up from the floor due to the pain. These symptoms have been going on for about 6 mos. Blood work done Jan 2024 and normal ESR, CRP, CK  He was taking a lot of tylenol   He was in [place] and golfing last week and the next day could not move. Had stiffness in the hands, saman the right hand and right shoulder. Felt swollen.  Was put on prednisone for the last week and joints feel so much better. Even shoulders and hips/thighs got better  No rashes         HISTORY:  Past Medical History:   Diagnosis Date    Anxiety     Back problem     Broken neck (HCC)     Chronic pain 04/06/2017    Essential hypertension 11/8/2023    High blood pressure     High cholesterol     Rheumatoid arteritis (HCC)       Social Hx Reviewed   Family Hx Reviewed     Medications (Active prior to today's visit):  Current Outpatient Medications   Medication Sig Dispense Refill    PARoxetine 20 MG Oral Tab Take 1 tablet (20 mg total) by mouth every morning.      leflunomide 20 MG Oral Tab Take 1 tablet (20 mg total) by mouth daily. 90 tablet 0    predniSONE 10 MG Oral Tab 30 mg daily x3 days then 20 mg daily x3 days then 10 mg daily x3 days then stop 20 tablet 0    rosuvastatin 10 MG Oral Tab Take 1 tablet (10 mg total) by mouth nightly. 90 tablet 3    tamsulosin 0.4 MG Oral Cap Take 1 capsule (0.4 mg total) by mouth daily. Take 1/2 hour following the same meal each day 90 capsule 3    losartan 25 MG Oral Tab Take 1 tablet (25 mg total) by mouth daily. (Patient taking differently: Take 1 tablet (25 mg total) by mouth at bedtime.) 90 tablet 3    PARoxetine 10 MG Oral Tab Take 1 tablet (10 mg total) by mouth every morning. (Patient  not taking: Reported on 3/20/2024)       .cmed  Allergies:  Allergies   Allergen Reactions    Other ANAPHYLAXIS     Bee Sting          ROS:   All other ROS are negative.     PHYSICAL EXAM:     GEN: AAOx3, NAD  HEENT: EOMI, PERRLA, no injection or icterus, oral mucosa moist, no oral lesions. No lymphadenopathy. No facial rash  CVS: RRR, no murmurs rubs or gallops. Equal 2+ distal pulses.   LUNGS: CTAB, no increased work of breathing  ABDOMEN:  soft NT/ND, +BS, no HSM  SKIN: No rashes or skin lesions. No nail findings  MSK:  No synovitis or swelling on exam, +FROM in all joints  TTP in L SI joint  NEURO: Cranial nerves II-XII intact grossly. 5/5 strength throughout in both upper and lower extremities, sensation intact.  PSYCH: normal mood       LABS:     Component      Latest Ref Rng & Units 3/16/2020 2/20/2020   C-Citrullinated Peptide IgG AB      0.0 - 6.9 U/mL  <0.4   RHEUMATOID FACTOR      <15 IU/mL  <10   SAPPHIRE SCREEN      Negative  Negative   C-REACTIVE PROTEIN      <0.30 mg/dL 0.78 (H)    SED RATE      0 - 20 mm/Hr 9      Imaging:     MRI L spine:  1. Multilevel degenerative changes of the lumbar spine as detailed. Notable levels as follows:   2. L4-L5:  Broad-based left paracentral/subarticular zone disc protrusion/extrusion, which results in moderate to severe left lateral recess stenosis and effacement of the traversing left L5 nerve root.  Please correlate for left L5 radiculopathy.     Additional multifactorial severe spinal canal with mild bilateral neural foraminal and mild right lateral recess stenosis at this level.  Notably, disc protrusion/extrusion at this level was present on prior lumbar MR from August, 2012.     3. L5-S1:  Mild-to-moderate left greater than right neural foraminal stenosis.   4. L3-L4:  Mild right greater than left neural foraminal and mild spinal canal stenosis.   5. L2-L3:  Mild bilateral neural foraminal stenosis.    US R hand 3/2020:  Mild synovial thickening is seen at the  thumb carpometacarpal joint and at the thumb and index finger MCP joints without hyperemia to suggest inflammatory arthritis.  Findings are most likely degenerative in nature.  Otherwise no synovitis is seen throughout the right hand.    ASSESSMENT/PLAN:     Polyarthralgias likely Seronegative RA vs PMR  - He was diagnosed with seronegative RA back in 2011 when he was found to have synovial cell count of 1700 and elevated inflammatory markers. Now, US of the right hand was done showing no evidence of synovitis  - He was previously on methotrexate which did not help  - He was off leflunomide for a few mos and had worsening shoulder/hip stiffness and right hand pain  - he was put on a steroid taper and had significant improvement in symptoms.  - now back on leflunomide 20 mg daily  - Blood work reviewed today and normal  - blood work every 3 mos     Lumbar disc herniation  - s/p surgery in October 2021 with Dr. Staples  - s/p lumbar fusion Nov 2023    Right ulnar neuropathy  - Having some numbness in his right fourth and fifth fingers, no pain.  No elbow pain.  Advised that we can do an EMG/nerve test.  He wants to hold off on that    Positive Quanteferon TB  - Patient was seen by [facility].  He was placed on rifampin in 5/62020 and completed treatment    Pt will f/u in 3 mos     Ayah Gregory MD  3/20/2024  2:40 PM

## 2024-04-16 RX ORDER — LOSARTAN POTASSIUM 25 MG/1
25 TABLET ORAL DAILY
Qty: 90 TABLET | Refills: 0 | Status: SHIPPED | OUTPATIENT
Start: 2024-04-16

## 2024-04-16 NOTE — TELEPHONE ENCOUNTER
Please review. Protocol Failed; No Protocol    Requested Prescriptions   Pending Prescriptions Disp Refills    LOSARTAN 25 MG Oral Tab [Pharmacy Med Name: LOSARTAN 25MG TABLETS] 90 tablet 3     Sig: TAKE 1 TABLET(25 MG) BY MOUTH DAILY       Hypertension Medications Protocol Failed - 4/16/2024  5:49 AM        Failed - Last BP reading less than 140/90     BP Readings from Last 1 Encounters:   03/20/24 (!) 154/93               Passed - CMP or BMP in past 12 months        Passed - In person appointment or virtual visit in the past 12 mos or appointment in next 3 mos     Recent Outpatient Visits              3 weeks ago Rheumatoid arthritis of multiple sites with negative rheumatoid factor (HCC)    [facility]    Office Visit    2 months ago [facility]    [facility] Visit    5 months ago Elevated PSA    [facility]    Office Visit    6 months ago Lumbar disc herniation    [facility]    Office Visit    7 months ago BPH with obstruction/lower urinary tract symptoms    [facility] [place] [facility]    [facility] Visit          Future Appointments         Provider Department Appt Notes    In 2 months Ayah Gregory MD [facility] Follow up, ongoung issues with RA                    Passed - EGFRCR or GFRNAA > 50     GFR Evaluation  EGFRCR: 89 , resulted on 10/9/2023                 Future Appointments         Provider Department Appt Notes    In 2 months Ayah Gregory MD [facility] Follow up, ongoung issues with RA          Recent Outpatient Visits              3 weeks ago Rheumatoid arthritis of multiple sites with negative rheumatoid factor (HCC)     [facility], Ayah Marrero MD    Office Visit    2 months ago S/P lumbar fusion    [facility] [place] Boco, Tibor, MD    Office Visit    5 months ago Elevated PSA    [facility], Olena Ca MD    Office Visit    6 months ago Lumbar disc herniation    [facility] [place] Cespedes, David B, DO    Office Visit    7 months ago BPH with obstruction/lower urinary tract symptoms    [facility], Olena Ca MD    Office Visit

## 2024-06-06 NOTE — PATIENT INSTRUCTIONS
Refill policies:    Allow 2-3 business days for refills; controlled substances may take longer.  Contact your pharmacy at least 5 days prior to running out of medication and have them send an electronic request or submit request through the “request refill” option in your [facility] account.  Refills are not addressed on weekends; covering physicians do not authorize routine medications on weekends.  No narcotics or controlled substances are refilled after noon on Fridays or by on call physicians.  By law, narcotics must be electronically prescribed.  A 30 day supply with no refills is the maximum allowed.  If your prescription is due for a refill, you may be due for a follow up appointment.  To best provide you care, patients receiving routine medications need to be seen at least once a year.  Patients receiving narcotic/controlled substance medications need to be seen at least once every 3 months.  In the event that your preferred pharmacy does not have the requested medication in stock (e.g. Backordered), it is your responsibility to find another pharmacy that has the requested medication available.  We will gladly send a new prescription to that pharmacy at your request.    Scheduling Tests:    If your physician has ordered radiology tests such as MRI or CT scans, please contact Central Scheduling at [phone] right away to schedule the test.  Once scheduled, the [facility] Centralized Referral Team will work with your insurance carrier to obtain pre-certification or prior authorization.  Depending on your insurance carrier, approval may take 3-10 days.  It is highly recommended patients assure they have received an authorization before having a test performed.  If test is done without insurance authorization, patient may be responsible for the entire amount billed.      Precertification and Prior Authorizations:  If your physician has recommended that you have a procedure or additional testing performed the [facility]  Centralized Referral Team will contact your insurance carrier to obtain pre-certification or prior authorization.    You are strongly encouraged to contact your insurance carrier to verify that your procedure/test has been approved and is a COVERED benefit.  Although the [facility] Centralized Referral Team does its due diligence, the insurance carrier gives the disclaimer that \"Although the procedure is authorized, this does not guarantee payment.\"    Ultimately the patient is responsible for payment.   Thank you for your understanding in this matter.  Paperwork Completion:  If you require FMLA or disability paperwork for your recovery, please make sure to either drop it off or have it faxed to our office at [phone]. Be sure the form has your name and date of birth on it.  The form will be faxed to our Forms Department and they will complete it for you.  There is a 25$ fee for all forms that need to be filled out.  Please be aware there is a 10-14 day turnaround time.  You will need to sign a release of information (MARIA ANTONIA) form if your paperwork does not come with one.  You may call the Forms Department with any questions at [phone].  Their fax number is [phone].     Dr. Staples recommends the following:    To schedule your Lumbar MRI scan without contrast that provider ordered, please call Central Scheduling at [phone].      Locations    [facility]  [address] [place] [address]    [facility]  [address] [place] [address]    [facility]  [address] [place] [address]  2 wide-bore MRIs, including a 3 Judy (3T) MRI    [facility] [place]  [address] [place] [address]  Wide-bore MRI    [facility] [address]  [address] [place] [address]    [facility]  [address] [place]  [address]  4 wide-bore MRIs, including 2 - 3T MRIs with “Caring Suites”  2 more wide-bore 1.5 Judy (1.5T) MRIs coming this spring    [facility]  [address] [place] [address]    [address] [address]  Wide-bore MRI      You do not need an appointment to have an x-ray done as walk-ins are accepted at any of our lab locations.     2. Neurontin 300 MG sent to your pharmacy and use as directed,    Follow up with Dr. Staples after imaging is complete.

## 2024-06-10 ENCOUNTER — HOSPITAL ENCOUNTER (OUTPATIENT)
Dept: GENERAL RADIOLOGY | Age: 64
Discharge: HOME | End: 2024-06-10
Payer: MEDICARE

## 2024-06-10 DIAGNOSIS — Z98.1 S/P LUMBAR FUSION: ICD-10-CM

## 2024-06-10 PROCEDURE — 72100 X-RAY EXAM L-S SPINE 2/3 VWS: CPT

## 2024-06-11 ENCOUNTER — TELEPHONE (OUTPATIENT)
Dept: FAMILY MEDICINE CLINIC | Facility: CLINIC | Age: 64
End: 2024-06-11

## 2024-06-11 ENCOUNTER — OFFICE VISIT (OUTPATIENT)
Dept: SURGERY | Facility: CLINIC | Age: 64
End: 2024-06-11
Payer: MEDICARE

## 2024-06-11 ENCOUNTER — TELEPHONE (OUTPATIENT)
Dept: CASE MANAGEMENT | Age: 64
End: 2024-06-11

## 2024-06-11 VITALS
HEART RATE: 92 BPM | BODY MASS INDEX: 32.14 KG/M2 | HEIGHT: 66 IN | WEIGHT: 200 LBS | SYSTOLIC BLOOD PRESSURE: 137 MMHG | DIASTOLIC BLOOD PRESSURE: 88 MMHG

## 2024-06-11 DIAGNOSIS — Z98.1 S/P LUMBAR FUSION: Primary | ICD-10-CM

## 2024-06-11 DIAGNOSIS — M47.27 LUMBOSACRAL SPONDYLOSIS WITH RADICULOPATHY: ICD-10-CM

## 2024-06-11 DIAGNOSIS — M54.16 LUMBAR RADICULOPATHY: Primary | ICD-10-CM

## 2024-06-11 DIAGNOSIS — M51.26 LUMBAR DISC HERNIATION: Primary | ICD-10-CM

## 2024-06-11 PROCEDURE — 99214 OFFICE O/P EST MOD 30 MIN: CPT | Performed by: NEUROLOGICAL SURGERY

## 2024-06-11 PROCEDURE — 3075F SYST BP GE 130 - 139MM HG: CPT | Performed by: NEUROLOGICAL SURGERY

## 2024-06-11 PROCEDURE — 3008F BODY MASS INDEX DOCD: CPT | Performed by: NEUROLOGICAL SURGERY

## 2024-06-11 PROCEDURE — 3079F DIAST BP 80-89 MM HG: CPT | Performed by: NEUROLOGICAL SURGERY

## 2024-06-11 RX ORDER — DIAZEPAM 5 MG/1
TABLET ORAL
Qty: 3 TABLET | Refills: 0 | Status: SHIPPED | OUTPATIENT
Start: 2024-06-11

## 2024-06-11 RX ORDER — LEFLUNOMIDE 20 MG/1
20 TABLET ORAL DAILY
Qty: 90 TABLET | Refills: 0 | Status: SHIPPED | OUTPATIENT
Start: 2024-06-11

## 2024-06-11 RX ORDER — GABAPENTIN 300 MG/1
300 CAPSULE ORAL 3 TIMES DAILY
Qty: 90 CAPSULE | Refills: 0 | Status: CANCELLED | OUTPATIENT
Start: 2024-06-11

## 2024-06-11 RX ORDER — GABAPENTIN 300 MG/1
300 CAPSULE ORAL 3 TIMES DAILY
Qty: 90 CAPSULE | Refills: 0 | Status: SHIPPED | OUTPATIENT
Start: 2024-06-11

## 2024-06-11 NOTE — TELEPHONE ENCOUNTER
Patient and spouse called on this, notified with understanding.  See separate telephone encounter.

## 2024-06-11 NOTE — TELEPHONE ENCOUNTER
Dr. Stuart, *    Patient requesting referral to Dr. Staples for appointment today, 6/11/24 at [facility].     Pended referral please review diagnosis and sign off if you agree.    Thank you.  Bella Vasquez  [facility]

## 2024-06-11 NOTE — PROGRESS NOTES
SEYMOUR CHÁVEZ M.D., F.A.A.N.S.     of Neurosurgery  [facility]  Board Certified Neurosurgeon                          Located within [facility], [facility], [facility]        [address] [place] [id number]    PHONE  [phone]          FAX  [phone]    [email or web address]      OFFICE FOLLOW-UP NOTE      Valdez Rose    : 1960    MRN: [id number]  CSN: [id number]      PCP: Alejandro Stuart DO  Referring Provider: No ref. provider found    Insurance: Payor: [employer] [facility] / Plan: [employer] / Product Type: Medicare /     Date of Visit: 2024    Reason for Visit:   Chief Complaint    Follow - Up                         History of Present Care:  Valdez Rose is a a(n) 63 year old, male.  Our patient is 6 months status post L4-5 interbody fusion.  He was doing well until about a month ago at which point he developed significantly limiting left lower extremity pain that radiates in the posterior thigh to the calf.  He denies any right lower extremity pain.  The pain in the left lower extremity has slightly improved from last month but it is still quite bothersome.  He had his recent 6-month postop x-ray.      History:  .  Past Medical History:    Anxiety    Back problem    Broken neck (HCC)    Chronic pain    Essential hypertension    High blood pressure    High cholesterol    Rheumatoid arteritis (HCC)      Past Surgical History:   Procedure Laterality Date    Colonoscopy N/A 02/10/2020    Procedure: COLONOSCOPY;  Surgeon: Rafael Saldana MD;  Location: [facility] ENDOSCOPY    Knee arthroscopy      Right knee arthroscopy    Other surgical history      Arthrocentesis of the right knee joint    Spine surgery procedure unlisted      Surgery - external      LUMBAR MICRODISCECTOMY      Family History   Problem Relation Age of Onset    Other (pulmo)  Father 75        leukemnia    Arthritis Mother     Heart Disorder Mother     Cancer Sister     Heart Disorder Sister     Heart Disorder Brother       Social History     Socioeconomic History    Marital status:      Spouse name: Not on file    Number of children: Not on file    Years of education: Not on file    Highest education level: Not on file   Occupational History    Not on file   Tobacco Use    Smoking status: Former     Current packs/day: 0.00     Average packs/day: 0.5 packs/day for 20.0 years (10.0 ttl pk-yrs)     Types: Cigarettes     Start date: 1996     Quit date: 2016     Years since quittin.4    Smokeless tobacco: Never    Tobacco comments:     I want a lung scan my sister  of Lung Cancer.   Vaping Use    Vaping status: Never Used   Substance and Sexual Activity    Alcohol use: Not Currently     Alcohol/week: 2.0 standard drinks of alcohol     Types: 2 Standard drinks or equivalent per week     Comment: social    Drug use: Never    Sexual activity: Not on file   Other Topics Concern     Service Not Asked    Blood Transfusions Not Asked    Caffeine Concern Yes     Comment: Coffee, 4 cups daily.    Occupational Exposure Not Asked    Hobby Hazards Not Asked    Sleep Concern Not Asked    Stress Concern Not Asked    Weight Concern Not Asked    Special Diet Not Asked    Back Care Not Asked    Exercise Not Asked    Bike Helmet Not Asked    Seat Belt Not Asked    Self-Exams Not Asked    Left Handed No    Right Handed Yes    Currently spends a great deal of time in the sun No    Past Sunlamp Treatments for Acne Not Asked    History of tanning No    Hx of Spending Great Deal of Time in Sun No    Bad sunburns in the past No    Tanning Salons in the Past No    Hx of Radiation Treatments No    Regular use of sun block Not Asked   Social History Narrative    Not on file     Social Determinants of Health     Financial Resource Strain: Low Risk  (11/10/2023)    Financial Resource Strain      Difficulty of Paying Living Expenses: Not hard at all     Med Affordability: No   Food Insecurity: No Food Insecurity (11/8/2023)    Food Insecurity     Food Insecurity: Never true   Transportation Needs: No Transportation Needs (11/10/2023)    Transportation Needs     Lack of Transportation: No   Physical Activity: Not on file   Stress: Not on file   Social Connections: Not on file   Housing Stability: Low Risk  (11/8/2023)    Housing Stability     Housing Instability: No     Housing Instability Emergency: Not on file     Crib or Bassinette: Not on file        Allergies:  Allergies   Allergen Reactions    Other ANAPHYLAXIS     Bee Sting          Medications:  Current Outpatient Medications   Medication Sig Dispense Refill    losartan 25 MG Oral Tab Take 1 tablet (25 mg total) by mouth daily. PATIENT NEEDS FASTING BLOOD TEST FOLLOWED BY APPOINTMENT. 90 tablet 0    predniSONE 5 MG Oral Tab Take 1 tablet (5 mg total) by mouth daily. 90 tablet 0    PARoxetine 20 MG Oral Tab Take 1 tablet (20 mg total) by mouth every morning.      leflunomide 20 MG Oral Tab Take 1 tablet (20 mg total) by mouth daily. 90 tablet 0    predniSONE 10 MG Oral Tab 30 mg daily x3 days then 20 mg daily x3 days then 10 mg daily x3 days then stop 20 tablet 0    rosuvastatin 10 MG Oral Tab Take 1 tablet (10 mg total) by mouth nightly. 90 tablet 3    PARoxetine 10 MG Oral Tab Take 1 tablet (10 mg total) by mouth every morning. (Patient not taking: Reported on 3/20/2024)      tamsulosin 0.4 MG Oral Cap Take 1 capsule (0.4 mg total) by mouth daily. Take 1/2 hour following the same meal each day 90 capsule 3        Review of Systems:  A 10-point system was reviewed.  Pertinent positives and negatives are noted in HPI.      Physical Exam:  /88 (BP Location: Right arm, Patient Position: Sitting, Cuff Size: adult)   Pulse 92   Ht 66\"   Wt 200 lb (90.7 kg)   BMI 32.28 kg/m²         Neurological Exam:    AAOx3, following  commands  PERRLA  EOMI  Face symmetrical  Tongue midline  Hearing symmetrical and intact to finger rub    No rhinorrhea or otorrhea    Romberg negative    Motor Strength:  Symmetrical and intact in the lower extremities    Sensation to light touch:  Symmetrical in the lower extremities    Incision:  Clean dry and intact    He has an absent left Achilles tendon reflex.    Abdomen:  Soft, non-distended, non-tender, with no rebound or guarding.  No peritoneal signs.     Extremities:  Non-tender, no lower extremity edema noted.      Labs:  CBC:  Lab Results   Component Value Date    WBC 4.6 10/09/2023    HGB 15.0 10/09/2023    HCT 46.0 10/09/2023    MCV 80.8 10/09/2023    .0 10/09/2023      BMG:  Lab Results   Component Value Date     (L) 10/09/2023    K 4.5 10/09/2023    CO2 27.0 10/09/2023     10/09/2023    BUN 22 (H) 10/09/2023      INR:  Lab Results   Component Value Date    INR 0.98 10/09/2023    INR 0.97 09/08/2021          Diagnostics:  I reviewed an x-ray of the lumbar spine with evidence of L4-5 interbody fusion.  The hardware is properly positioned and there is no evidence of any hardware complications.    Diagnosis:  1. S/P lumbar fusion    2. Lumbosacral spondylosis with radiculopathy      Assessment/Plan:    Our patient presents with what appears to be a left S1 radiculopathy which has become quite debilitating last month, 5 months status post an L4-5 interbody fusion.  I am concerned about a new lumbosacral disc herniation and I would like to obtain an MRI of the lumbar spine for that purpose.  The x-ray of the L4-5 interbody fusion appears satisfactory without evidence of complications.  We will also prescribe Neurontin 300 mg 3 times daily to help with the left lower extremity pain.  Will follow-up after the MRI.    More than 30 minutes were spent during this visit and the coordination of this patient's care. All questions and concerns were addressed. We appreciate the opportunity to  participate in the care of this patient. Please do not hesitate to call our office ([phone]) with any issues.        Seamus Stpales M.D., F.A.A.N.S.    6/11/2024  9:50 AM    This note has been dictated utilizing voice recognition software. Unfortunately, this may lead to occasional typographical errors. If there are any questions regarding this, please do not hesitate to contact our office.

## 2024-06-11 NOTE — TELEPHONE ENCOUNTER
Requested Prescriptions     Pending Prescriptions Disp Refills    leflunomide 20 MG Oral Tab 90 tablet 0     Sig: Take 1 tablet (20 mg total) by mouth daily.     Future Appointments   Date Time Provider Department Center   7/3/2024 12:00 PM Ayah Gregory MD [facility]     LOV: 3/20/24   Last Refilled:3/14/24 #90 0RF   Labs:  Component      Latest Ref Rng 1/30/2024   C-Citrullinated Peptide IgG AB      0.0 - 6.9 U/mL 0.7    C-REACTIVE PROTEIN      <1.00 mg/dL <0.40    SED RATE      0 - 20 mm/Hr 4    RHEUMATOID FACTOR      <14 IU/mL <10    CK      46 - 171 U/L 74        ASSESSMENT/PLAN:      Polyarthralgias likely Seronegative RA vs PMR  - He was diagnosed with seronegative RA back in 2011 when he was found to have synovial cell count of 1700 and elevated inflammatory markers. Now, US of the right hand was done showing no evidence of synovitis  - He was previously on methotrexate which did not help  - He was off leflunomide for a few mos and had worsening shoulder/hip stiffness and right hand pain  - he was put on a steroid taper and had significant improvement in symptoms.  - now back on leflunomide 20 mg daily  - Blood work reviewed today and normal  - blood work every 3 mos      Lumbar disc herniation  - s/p surgery in October 2021 with Dr. Staples  - s/p lumbar fusion Nov 2023     Right ulnar neuropathy  - Having some numbness in his right fourth and fifth fingers, no pain.  No elbow pain.  Advised that we can do an EMG/nerve test.  He wants to hold off on that     Positive Quanteferon TB  - Patient was seen by [facility].  He was placed on rifampin in 5/62020 and completed treatment     Pt will f/u in 3 mos      Ayah Gregory MD  3/20/2024  2:40 PM

## 2024-06-29 ENCOUNTER — LAB ENCOUNTER (OUTPATIENT)
Dept: LAB | Age: 64
End: 2024-06-29
Attending: SURGERY
Payer: MEDICARE

## 2024-06-29 DIAGNOSIS — M06.09 RHEUMATOID ARTHRITIS OF MULTIPLE SITES WITH NEGATIVE RHEUMATOID FACTOR (HCC): ICD-10-CM

## 2024-06-29 DIAGNOSIS — Z51.81 MEDICATION MONITORING ENCOUNTER: ICD-10-CM

## 2024-06-29 LAB
ALBUMIN SERPL-MCNC: 4.6 G/DL (ref 3.2–4.8)
ALT SERPL-CCNC: 27 U/L
AST SERPL-CCNC: 21 U/L (ref ?–34)
BASOPHILS # BLD AUTO: 0.04 X10(3) UL (ref 0–0.2)
BASOPHILS NFR BLD AUTO: 0.8 %
CREAT BLD-MCNC: 0.81 MG/DL
CRP SERPL-MCNC: <0.4 MG/DL (ref ?–1)
DEPRECATED RDW RBC AUTO: 40.6 FL (ref 35.1–46.3)
EGFRCR SERPLBLD CKD-EPI 2021: 97 ML/MIN/1.73M2 (ref 60–?)
EOSINOPHIL # BLD AUTO: 0.07 X10(3) UL (ref 0–0.7)
EOSINOPHIL NFR BLD AUTO: 1.4 %
ERYTHROCYTE [DISTWIDTH] IN BLOOD BY AUTOMATED COUNT: 13.4 % (ref 11–15)
ERYTHROCYTE [SEDIMENTATION RATE] IN BLOOD: 11 MM/HR
HCT VFR BLD AUTO: 43 %
HGB BLD-MCNC: 13.6 G/DL
IMM GRANULOCYTES # BLD AUTO: 0.01 X10(3) UL (ref 0–1)
IMM GRANULOCYTES NFR BLD: 0.2 %
LYMPHOCYTES # BLD AUTO: 1.04 X10(3) UL (ref 1–4)
LYMPHOCYTES NFR BLD AUTO: 21 %
MCH RBC QN AUTO: 26.4 PG (ref 26–34)
MCHC RBC AUTO-ENTMCNC: 31.6 G/DL (ref 31–37)
MCV RBC AUTO: 83.3 FL
MONOCYTES # BLD AUTO: 0.52 X10(3) UL (ref 0.1–1)
MONOCYTES NFR BLD AUTO: 10.5 %
NEUTROPHILS # BLD AUTO: 3.27 X10 (3) UL (ref 1.5–7.7)
NEUTROPHILS # BLD AUTO: 3.27 X10(3) UL (ref 1.5–7.7)
NEUTROPHILS NFR BLD AUTO: 66.1 %
PLATELET # BLD AUTO: 209 10(3)UL (ref 150–450)
RBC # BLD AUTO: 5.16 X10(6)UL
WBC # BLD AUTO: 5 X10(3) UL (ref 4–11)

## 2024-06-29 PROCEDURE — 84450 TRANSFERASE (AST) (SGOT): CPT

## 2024-06-29 PROCEDURE — 82040 ASSAY OF SERUM ALBUMIN: CPT

## 2024-06-29 PROCEDURE — 82565 ASSAY OF CREATININE: CPT

## 2024-06-29 PROCEDURE — 86140 C-REACTIVE PROTEIN: CPT

## 2024-06-29 PROCEDURE — 84460 ALANINE AMINO (ALT) (SGPT): CPT

## 2024-06-29 PROCEDURE — 85652 RBC SED RATE AUTOMATED: CPT

## 2024-06-29 PROCEDURE — 85025 COMPLETE CBC W/AUTO DIFF WBC: CPT

## 2024-06-29 PROCEDURE — 36415 COLL VENOUS BLD VENIPUNCTURE: CPT

## 2024-09-04 ENCOUNTER — TELEPHONE (OUTPATIENT)
Dept: RHEUMATOLOGY | Facility: CLINIC | Age: 64
End: 2024-09-04

## 2024-09-04 DIAGNOSIS — M06.09 RHEUMATOID ARTHRITIS OF MULTIPLE SITES WITH NEGATIVE RHEUMATOID FACTOR (HCC): Primary | ICD-10-CM

## 2024-09-04 NOTE — TELEPHONE ENCOUNTER
Dr. Stuart is currently out of the office for the day, returning tomorrow 9/5.  Referral has been pended, please sign if appropriate.

## 2024-09-04 NOTE — TELEPHONE ENCOUNTER
Patient has upcoming follow up appt with DR. Ayah Gregory (rheumatology) on 09/06. Patient has [employer] HMO and there is no referral on file. Please advise.

## 2024-09-05 ENCOUNTER — LAB ENCOUNTER (OUTPATIENT)
Dept: LAB | Age: 64
End: 2024-09-05
Attending: INTERNAL MEDICINE
Payer: MEDICARE

## 2024-09-05 DIAGNOSIS — Z51.81 MEDICATION MONITORING ENCOUNTER: ICD-10-CM

## 2024-09-05 DIAGNOSIS — M06.09 RHEUMATOID ARTHRITIS OF MULTIPLE SITES WITH NEGATIVE RHEUMATOID FACTOR (HCC): ICD-10-CM

## 2024-09-05 LAB
ALBUMIN SERPL-MCNC: 4.7 G/DL (ref 3.2–4.8)
ALT SERPL-CCNC: 22 U/L
AST SERPL-CCNC: 26 U/L (ref ?–34)
BASOPHILS # BLD AUTO: 0.04 X10(3) UL (ref 0–0.2)
BASOPHILS NFR BLD AUTO: 0.9 %
CREAT BLD-MCNC: 0.79 MG/DL
CRP SERPL-MCNC: 0.6 MG/DL (ref ?–1)
DEPRECATED RDW RBC AUTO: 41 FL (ref 35.1–46.3)
EGFRCR SERPLBLD CKD-EPI 2021: 98 ML/MIN/1.73M2 (ref 60–?)
EOSINOPHIL # BLD AUTO: 0.07 X10(3) UL (ref 0–0.7)
EOSINOPHIL NFR BLD AUTO: 1.6 %
ERYTHROCYTE [DISTWIDTH] IN BLOOD BY AUTOMATED COUNT: 13.6 % (ref 11–15)
ERYTHROCYTE [SEDIMENTATION RATE] IN BLOOD: 16 MM/HR
HCT VFR BLD AUTO: 43.8 %
HGB BLD-MCNC: 13.8 G/DL
IMM GRANULOCYTES # BLD AUTO: 0.01 X10(3) UL (ref 0–1)
IMM GRANULOCYTES NFR BLD: 0.2 %
LYMPHOCYTES # BLD AUTO: 1.01 X10(3) UL (ref 1–4)
LYMPHOCYTES NFR BLD AUTO: 23.7 %
MCH RBC QN AUTO: 26.1 PG (ref 26–34)
MCHC RBC AUTO-ENTMCNC: 31.5 G/DL (ref 31–37)
MCV RBC AUTO: 82.8 FL
MONOCYTES # BLD AUTO: 0.39 X10(3) UL (ref 0.1–1)
MONOCYTES NFR BLD AUTO: 9.1 %
NEUTROPHILS # BLD AUTO: 2.75 X10 (3) UL (ref 1.5–7.7)
NEUTROPHILS # BLD AUTO: 2.75 X10(3) UL (ref 1.5–7.7)
NEUTROPHILS NFR BLD AUTO: 64.5 %
PLATELET # BLD AUTO: 213 10(3)UL (ref 150–450)
RBC # BLD AUTO: 5.29 X10(6)UL
WBC # BLD AUTO: 4.3 X10(3) UL (ref 4–11)

## 2024-09-05 PROCEDURE — 85652 RBC SED RATE AUTOMATED: CPT

## 2024-09-05 PROCEDURE — 84460 ALANINE AMINO (ALT) (SGPT): CPT

## 2024-09-05 PROCEDURE — 36415 COLL VENOUS BLD VENIPUNCTURE: CPT

## 2024-09-05 PROCEDURE — 84450 TRANSFERASE (AST) (SGOT): CPT

## 2024-09-05 PROCEDURE — 86140 C-REACTIVE PROTEIN: CPT

## 2024-09-05 PROCEDURE — 82565 ASSAY OF CREATININE: CPT

## 2024-09-05 PROCEDURE — 82040 ASSAY OF SERUM ALBUMIN: CPT

## 2024-09-05 PROCEDURE — 85025 COMPLETE CBC W/AUTO DIFF WBC: CPT

## 2024-09-06 ENCOUNTER — OFFICE VISIT (OUTPATIENT)
Dept: RHEUMATOLOGY | Facility: CLINIC | Age: 64
End: 2024-09-06
Payer: MEDICARE

## 2024-09-06 VITALS
HEART RATE: 78 BPM | BODY MASS INDEX: 33.11 KG/M2 | HEIGHT: 66 IN | WEIGHT: 206 LBS | DIASTOLIC BLOOD PRESSURE: 81 MMHG | SYSTOLIC BLOOD PRESSURE: 144 MMHG

## 2024-09-06 DIAGNOSIS — M06.09 RHEUMATOID ARTHRITIS OF MULTIPLE SITES WITH NEGATIVE RHEUMATOID FACTOR (HCC): Primary | ICD-10-CM

## 2024-09-06 DIAGNOSIS — Z51.81 MEDICATION MONITORING ENCOUNTER: ICD-10-CM

## 2024-09-06 PROCEDURE — 1125F AMNT PAIN NOTED PAIN PRSNT: CPT | Performed by: INTERNAL MEDICINE

## 2024-09-06 PROCEDURE — 3077F SYST BP >= 140 MM HG: CPT | Performed by: INTERNAL MEDICINE

## 2024-09-06 PROCEDURE — 1160F RVW MEDS BY RX/DR IN RCRD: CPT | Performed by: INTERNAL MEDICINE

## 2024-09-06 PROCEDURE — 3008F BODY MASS INDEX DOCD: CPT | Performed by: INTERNAL MEDICINE

## 2024-09-06 PROCEDURE — 1159F MED LIST DOCD IN RCRD: CPT | Performed by: INTERNAL MEDICINE

## 2024-09-06 PROCEDURE — G2211 COMPLEX E/M VISIT ADD ON: HCPCS | Performed by: INTERNAL MEDICINE

## 2024-09-06 PROCEDURE — 3079F DIAST BP 80-89 MM HG: CPT | Performed by: INTERNAL MEDICINE

## 2024-09-06 PROCEDURE — 99214 OFFICE O/P EST MOD 30 MIN: CPT | Performed by: INTERNAL MEDICINE

## 2024-09-06 RX ORDER — LEFLUNOMIDE 20 MG/1
20 TABLET ORAL DAILY
Qty: 90 TABLET | Refills: 1 | Status: SHIPPED | OUTPATIENT
Start: 2024-09-06

## 2024-09-06 NOTE — PROGRESS NOTES
Valdez Rose is a 64 year old male.    HPI:     Chief Complaint   Patient presents with    Rheumatoid Arthritis         Presents today 9/6/2024 for f/u of PMR vs Seronegative RA    Current Medications:  Leflunomide 20 mg daily- started June 2020- Sept 2023, restarted 3/2024  Blood work:  Neg SAPPHIRE, RF, CCP, ESR, CRP 0.78  Synovial fluid 2011 cell count 1700  US R hand: no synovitis    Interval History:  This is a 61 yo M with hx of neck injury from a skiing accident about 20 years ago presents with polyarthralgias.  He has chronic neck pain due to a skiing injury and has had epidural injections into his neck.  He was also seen by Dr. Lance back in 2011 and diagnosed with seronegative RA.  At that time he presented with R knee pain and swelling, synovial fluid showed cell count of 1700 with no crystals.  He was also found to have elevated CRP of 70.  He was eventually placed on methotrexate which eventually did not work and then leflunomide.  He has not seen Dr. Lance since 2013.  He now presents with diffuse joint pain involving his hands, wrists, shoulders, knees.  He also reports bilateral shoulder and hip girdle stiffness.  He reports morning stiffness for more than 2 to 4 hours.  It is hard for him to raise his arms or get up from a seated position.  He also has numbness and tingling in his right hand.  Denies any history of psoriasis, uveitis, dactylitis, enthesitis, GI or  symptoms.  He currently is taking Advil 4 pills daily as needed.  He was prescribed prednisone back in November due to his back pain and all his symptoms improved.    9/14/2021  Presents for f/u  Seronegative RA  He initially presented with bilateral shoulder and hip girdle stiffness.  Thought was possible PMR.  He then also had pain and swelling in his hands wrists and knees therefore it seems like it progressed to seronegative RA  Initially he was treated with prednisone but it has been weaned off now  Now on leflunomide 20 mg  daily for the past year  Reports some mild stiffness in his hand but overall doing well.  No swelling.  Able to make a full fist  He is been having worsening lower back pain with radiculopathy symptoms down his left leg.  He has had 2 epidural injections but they did not help.  He will be following with neurosurgeon Dr. Staples and planning for surgery in October    1/15/2022:  Presents for f/u  Seronegative RA  Currently on leflunomide 20 mg daily  Most recent blood work reviewed and normal inflammatory markers  Had back surgery back in October, feels 50% better  Having some left elbow pain mostly in the lateral epicondyle region.     10/10/2022:  Presents for f/u  Seronegative RA  Currently on leflunomide 20 mg daily  Labs reviewed from September, normal kidney and liver test, normal ESR and CRP  Reports that his joints are doing okay.  Will have some stiffness in his hands but no swelling.  No pain in his shoulders  Continues to have lower back pain intermittently.  He golfs every Sunday with his brother which does make his back pain worse.  Continues to work with his hands a lot.    4/26/2023:  Presents for f/u  Seronegative RA  Currently on leflunomide 20 mg daily  He was taking every other day but over the last week has been taking it daily.  This is due to pain in his left calf region in the gastrocnemius muscle.  Also worsening lower back pain going down his legs.  The symptoms have been ongoing for the past 2 or 3 months  He was seen by Dr. Pulido, states he has a gastrocnemius muscle strain and lumbar radiculopathy  He gave him a steroid pack, he has not taken it yet  No joint pain or swelling.  Joints are doing really well  Has some numbness in his right fourth and fifth fingers mostly on the tips.  Has been going on for the past 2 or 3 months.  No pain in his elbows.    3/20/2024:  Presents for f/u  Seronegative RA  He was on leflunomide but stopped it around Sept 2023 as he was doing well.  He was also  getting medrol dose packs for the lower back pain until he had back surgery  Had lumbar fusion November 2023 and was doing well. He was on pain medications after the surgery but has been off of it now  Over the past 6 mos had significant pain in the arms and legs, more in the proximal upper arms and also the thigh/hip regions. It was hard to get up from the floor due to the pain. These symptoms have been going on for about 6 mos. Blood work done Jan 2024 and normal ESR, CRP, CK  He was taking a lot of tylenol   He was in [place] and golfing last week and the next day could not move. Had stiffness in the hands, saman the right hand and right shoulder. Felt swollen.  Was put on prednisone for the last week and joints feel so much better. Even shoulders and hips/thighs got better  No rashes     9/6/2024:  Presents for f/u  Seronegative RA  He is on leflunomide 20 mg daily  He stopped prednisone around June 2024  Recent blood work shows normal kidney and liver test and normal inflammation markers  Minimal joint pain or swelling  Has some soreness in the shoulder but has FROM in the shoulders  Has been golfing and having some more muscle pain in the shoulders  No pain in hips  No pain in hands now  Has some residual n/t in left leg           HISTORY:  Past Medical History:    Anxiety    Back problem    Broken neck (HCC)    Chronic pain    Essential hypertension    High blood pressure    High cholesterol    Rheumatoid arteritis (HCC)      Social Hx Reviewed   Family Hx Reviewed     Medications (Active prior to today's visit):  Current Outpatient Medications   Medication Sig Dispense Refill    leflunomide 20 MG Oral Tab Take 1 tablet (20 mg total) by mouth daily. 90 tablet 0    losartan 25 MG Oral Tab Take 1 tablet (25 mg total) by mouth daily. PATIENT NEEDS FASTING BLOOD TEST FOLLOWED BY APPOINTMENT. 90 tablet 0    rosuvastatin 10 MG Oral Tab Take 1 tablet (10 mg total) by mouth nightly. 90 tablet 3    PARoxetine 10 MG Oral Tab  Take 1 tablet (10 mg total) by mouth every morning.      tamsulosin 0.4 MG Oral Cap Take 1 capsule (0.4 mg total) by mouth daily. Take 1/2 hour following the same meal each day 90 capsule 3    gabapentin (NEURONTIN) 300 MG Oral Cap Take 1 capsule (300 mg total) by mouth 3 (three) times daily. (Patient not taking: Reported on 2024) 90 capsule 0    diazePAM 5 MG Oral Tab Si tablet night before procedure, 1 tablet 1 hour before procedure, 1 tablet 1/2-hour before procedure (Patient not taking: Reported on 2024) 3 tablet 0    predniSONE 5 MG Oral Tab Take 1 tablet (5 mg total) by mouth daily. (Patient not taking: Reported on 2024) 90 tablet 0    PARoxetine 20 MG Oral Tab Take 1 tablet (20 mg total) by mouth every morning. (Patient not taking: Reported on 2024)      predniSONE 10 MG Oral Tab 30 mg daily x3 days then 20 mg daily x3 days then 10 mg daily x3 days then stop (Patient not taking: Reported on 2024) 20 tablet 0     .cmed  Allergies:  Allergies   Allergen Reactions    Other ANAPHYLAXIS     Bee Sting          ROS:   All other ROS are negative.     PHYSICAL EXAM:     GEN: AAOx3, NAD  HEENT: EOMI, PERRLA, no injection or icterus, oral mucosa moist, no oral lesions. No lymphadenopathy. No facial rash  CVS: RRR, no murmurs rubs or gallops. Equal 2+ distal pulses.   LUNGS: CTAB, no increased work of breathing  ABDOMEN:  soft NT/ND, +BS, no HSM  SKIN: No rashes or skin lesions. No nail findings  MSK:  No synovitis or swelling on exam, +FROM in all joints  NEURO: Cranial nerves II-XII intact grossly. 5/5 strength throughout in both upper and lower extremities, sensation intact.  PSYCH: normal mood       LABS:     Component      Latest Ref Rng & Units 3/16/2020 2020   C-Citrullinated Peptide IgG AB      0.0 - 6.9 U/mL  <0.4   RHEUMATOID FACTOR      <15 IU/mL  <10   SAPPHIRE SCREEN      Negative  Negative   C-REACTIVE PROTEIN      <0.30 mg/dL 0.78 (H)    SED RATE      0 - 20 mm/Hr 9      Imaging:      MRI L spine:  1. Multilevel degenerative changes of the lumbar spine as detailed. Notable levels as follows:   2. L4-L5:  Broad-based left paracentral/subarticular zone disc protrusion/extrusion, which results in moderate to severe left lateral recess stenosis and effacement of the traversing left L5 nerve root.  Please correlate for left L5 radiculopathy.     Additional multifactorial severe spinal canal with mild bilateral neural foraminal and mild right lateral recess stenosis at this level.  Notably, disc protrusion/extrusion at this level was present on prior lumbar MR from August, 2012.     3. L5-S1:  Mild-to-moderate left greater than right neural foraminal stenosis.   4. L3-L4:  Mild right greater than left neural foraminal and mild spinal canal stenosis.   5. L2-L3:  Mild bilateral neural foraminal stenosis.    US R hand 3/2020:  Mild synovial thickening is seen at the thumb carpometacarpal joint and at the thumb and index finger MCP joints without hyperemia to suggest inflammatory arthritis.  Findings are most likely degenerative in nature.  Otherwise no synovitis is seen throughout the right hand.    ASSESSMENT/PLAN:     Polyarthralgias likely Seronegative RA vs PMR- improved   - He was diagnosed with seronegative RA back in 2011 when he was found to have synovial cell count of 1700 and elevated inflammatory markers. Now, US of the right hand was done showing no evidence of synovitis  - He was previously on methotrexate which did not help  - He is on leflunomide 20 mg daily and doing well has some soreness in his shoulders but overall full range of motion.  No swelling in his hands  - He has been off prednisone since June 2024  - Blood work reviewed today and normal  - blood work every 3 mos     Lumbar disc herniation- stable   - s/p surgery in October 2021 with Dr. Staples  - s/p lumbar fusion Nov 2023    Right ulnar neuropathy- improved   - Having some numbness in his right fourth and fifth fingers, no  pain.  No elbow pain.  Advised that we can do an EMG/nerve test.  He wants to hold off on that    Positive Quanteferon TB  - Patient was seen by [facility].  He was placed on rifampin in 5/62020 and completed treatment    Pt will f/u in 6 mos , he will still get blood work every 3 mos    There is a longitudinal care relationship with me, the care plan reflects the ongoing nature of the continuous relationship of care, and the medical record indicates that there is ongoing treatment of a serious/complex medical condition which I am currently managing.  is Applicable.     Ayah Gregory MD  9/6/2024  9:40 AM

## 2024-09-17 RX ORDER — LOSARTAN POTASSIUM 25 MG/1
25 TABLET ORAL DAILY
Qty: 90 TABLET | Refills: 0 | Status: SHIPPED | OUTPATIENT
Start: 2024-09-17

## 2024-09-17 NOTE — TELEPHONE ENCOUNTER
Please review. Protocol Failed; No Protocol    Requested Prescriptions   Pending Prescriptions Disp Refills    LOSARTAN 25 MG Oral Tab [Pharmacy Med Name: LOSARTAN 25MG TABLETS] 90 tablet 0     Sig: TAKE 1 TABLET(25 MG) BY MOUTH DAILY       Hypertension Medications Protocol Failed - 9/12/2024  3:55 PM        Failed - Last BP reading less than 140/90     BP Readings from Last 1 Encounters:   09/06/24 144/81               Passed - CMP or BMP in past 12 months        Passed - In person appointment or virtual visit in the past 12 mos or appointment in next 3 mos     Recent Outpatient Visits              1 week ago Rheumatoid arthritis of multiple sites with negative rheumatoid factor ([facility])    [facility], Ayah Marrero MD    Office Visit    3 months ago [facility]    [facility] Visit    6 months ago Rheumatoid arthritis of multiple sites with negative rheumatoid factor ([facility]    Office Visit    7 months ago S/P lumbar fusion    [facility] [place], Tibor, MD    Office Visit    10 months ago [facility]vated PSA    [facility]    [facility]                      Passed - EGFRCR or GFRNAA > 50     GFR Evaluation  EGFRCR: 98 , resulted on 9/5/2024                   Recent Outpatient Visits              1 week ago Rheumatoid arthritis of multiple sites with negative rheumatoid factor ([facility]    [facility]    3 months ago [facility]P [facility]    Office Visit    6 months ago Rheumatoid arthritis of multiple sites with negative rheumatoid factor ([facility])    [facility]    Office Visit    7 months ago S/P lumbar fusion    [facility] [place] Seamus Staples MD    Office Visit    10 months ago Elevated PSA    [facility] [place]    Office Visit

## 2024-10-01 ENCOUNTER — TELEPHONE (OUTPATIENT)
Dept: SURGERY | Facility: CLINIC | Age: 64
End: 2024-10-01

## 2024-10-01 RX ORDER — TAMSULOSIN HYDROCHLORIDE 0.4 MG/1
0.4 CAPSULE ORAL DAILY
Qty: 60 CAPSULE | Refills: 0 | Status: SHIPPED | OUTPATIENT
Start: 2024-10-01 | End: 2024-11-30

## 2024-10-01 NOTE — TELEPHONE ENCOUNTER
Called patient's wife Monika, verified name and  of her  Valdez. I let wife know Dimitris is due for an office visit and he needs to make an appointment. Wife agreed, scheduled an appointment for 10/25/2024 at 8:50 am. Also let wife know we will sent a temporary refill for Tamsulosin to patient's pharmacy.   Patient's wife agreed, verbalized understandings and has no further questions.

## 2024-10-17 ENCOUNTER — TELEPHONE (OUTPATIENT)
Dept: SURGERY | Facility: CLINIC | Age: 64
End: 2024-10-17

## 2024-10-17 ENCOUNTER — TELEPHONE (OUTPATIENT)
Dept: CASE MANAGEMENT | Age: 64
End: 2024-10-17

## 2024-10-17 DIAGNOSIS — R97.20 ELEVATED PSA: Primary | ICD-10-CM

## 2024-10-17 NOTE — TELEPHONE ENCOUNTER
Dr. Stuart,     Patient called requesting referral to Dr. Hartman.     Pended referral please review diagnosis and sign off if you agree.    Thank you.  Bella Vasquez  [facility]

## 2024-10-25 ENCOUNTER — OFFICE VISIT (OUTPATIENT)
Dept: SURGERY | Facility: CLINIC | Age: 64
End: 2024-10-25
Payer: MEDICARE

## 2024-10-25 ENCOUNTER — LAB ENCOUNTER (OUTPATIENT)
Dept: LAB | Facility: HOSPITAL | Age: 64
End: 2024-10-25
Attending: UROLOGY
Payer: MEDICARE

## 2024-10-25 DIAGNOSIS — N13.8 BPH WITH OBSTRUCTION/LOWER URINARY TRACT SYMPTOMS: Primary | ICD-10-CM

## 2024-10-25 DIAGNOSIS — N40.1 BPH WITH OBSTRUCTION/LOWER URINARY TRACT SYMPTOMS: Primary | ICD-10-CM

## 2024-10-25 DIAGNOSIS — N40.1 BPH WITH OBSTRUCTION/LOWER URINARY TRACT SYMPTOMS: ICD-10-CM

## 2024-10-25 DIAGNOSIS — N13.8 BPH WITH OBSTRUCTION/LOWER URINARY TRACT SYMPTOMS: ICD-10-CM

## 2024-10-25 DIAGNOSIS — R97.20 ELEVATED PSA: ICD-10-CM

## 2024-10-25 LAB
BILIRUB UR QL: NEGATIVE
CLARITY UR: CLEAR
GLUCOSE UR-MCNC: NORMAL MG/DL
HGB UR QL STRIP.AUTO: NEGATIVE
KETONES UR-MCNC: NEGATIVE MG/DL
LEUKOCYTE ESTERASE UR QL STRIP.AUTO: NEGATIVE
NITRITE UR QL STRIP.AUTO: NEGATIVE
PH UR: 6.5 [PH] (ref 5–8)
PROT UR-MCNC: 30 MG/DL
PSA SERPL-MCNC: 1.55 NG/ML (ref ?–4)
SP GR UR STRIP: 1.02 (ref 1–1.03)
UROBILINOGEN UR STRIP-ACNC: NORMAL

## 2024-10-25 PROCEDURE — 81001 URINALYSIS AUTO W/SCOPE: CPT

## 2024-10-25 PROCEDURE — 36415 COLL VENOUS BLD VENIPUNCTURE: CPT

## 2024-10-25 PROCEDURE — 99214 OFFICE O/P EST MOD 30 MIN: CPT | Performed by: UROLOGY

## 2024-10-25 PROCEDURE — 1159F MED LIST DOCD IN RCRD: CPT | Performed by: UROLOGY

## 2024-10-25 PROCEDURE — 84153 ASSAY OF PSA TOTAL: CPT

## 2024-10-25 RX ORDER — TAMSULOSIN HYDROCHLORIDE 0.4 MG/1
0.4 CAPSULE ORAL DAILY
Qty: 90 CAPSULE | Refills: 3 | Status: SHIPPED | OUTPATIENT
Start: 2024-10-25 | End: 2025-10-20

## 2024-10-25 NOTE — PROGRESS NOTES
Valdez Rose is a 64 year old male.    HPI:     Chief Complaint   Patient presents with    Follow - Up     Yearly f/u, denies trouble urinating       64-year-old male with a history of BPH, lower urinary tract symptoms well-managed on tamsulosin 0.4 mg daily and follow-up to a previous visit 2023.  Overall reports well-controlled symptoms.  Satisfied with clinical response.  IPSS score is 5 quality-of-life index is 2.  No dysuria or gross hematuria.      HISTORY:  Past Medical History:    Anxiety    Back problem    Broken neck (HCC)    Chronic pain    Essential hypertension    High blood pressure    High cholesterol    Rheumatoid arteritis (HCC)      Past Surgical History:   Procedure Laterality Date    Colonoscopy N/A 02/10/2020    Procedure: COLONOSCOPY;  Surgeon: Rafael Saldana MD;  Location: [facility] ENDOSCOPY    Knee arthroscopy      Right knee arthroscopy    Other surgical history      Arthrocentesis of the right knee joint    Spine surgery procedure unlisted      Surgery - external      LUMBAR MICRODISCECTOMY      Family History   Problem Relation Age of Onset    Other (pulmo) Father 75        leukemnia    Arthritis Mother     Heart Disorder Mother     Cancer Sister     Heart Disorder Sister     Heart Disorder Brother       Social History:   Social History     Socioeconomic History    Marital status:    Tobacco Use    Smoking status: Former     Current packs/day: 0.00     Average packs/day: 0.5 packs/day for 20.0 years (10.0 ttl pk-yrs)     Types: Cigarettes     Start date: 1996     Quit date: 2016     Years since quittin.8    Smokeless tobacco: Never    Tobacco comments:     I want a lung scan my sister  of Lung Cancer.   Vaping Use    Vaping status: Never Used   Substance and Sexual Activity    Alcohol use: Not Currently     Alcohol/week: 2.0 standard drinks of alcohol     Types: 2 Standard drinks or equivalent per week     Comment: social    Drug use: Never   Other  Topics Concern    Caffeine Concern Yes     Comment: Coffee, 4 cups daily.    Left Handed No    Right Handed Yes    Currently spends a great deal of time in the sun No    History of tanning No    Hx of Spending Great Deal of Time in Sun No    Bad sunburns in the past No    Tanning Salons in the Past No    Hx of Radiation Treatments No     Social Drivers of Health     Financial Resource Strain: Low Risk  (11/10/2023)    Financial Resource Strain     Difficulty of Paying Living Expenses: Not hard at all     Med Affordability: No   Food Insecurity: No Food Insecurity (2023)    Food Insecurity     Food Insecurity: Never true   Transportation Needs: No Transportation Needs (11/10/2023)    Transportation Needs     Lack of Transportation: No   Housing Stability: Low Risk  (2023)    Housing Stability     Housing Instability: No        Medications (Active prior to today's visit):  Current Outpatient Medications   Medication Sig Dispense Refill    tamsulosin 0.4 MG Oral Cap Take 1 capsule (0.4 mg total) by mouth daily. Take 1/2 hour following the same meal each day 60 capsule 0    losartan 25 MG Oral Tab Take 1 tablet (25 mg total) by mouth daily. PATIENT NEEDS FASTING BLOOD TEST FOLLOWED BY APPOINTMENT. 90 tablet 0    leflunomide 20 MG Oral Tab Take 1 tablet (20 mg total) by mouth daily. 90 tablet 1    gabapentin (NEURONTIN) 300 MG Oral Cap Take 1 capsule (300 mg total) by mouth 3 (three) times daily. (Patient not taking: Reported on 2024) 90 capsule 0    diazePAM 5 MG Oral Tab Si tablet night before procedure, 1 tablet 1 hour before procedure, 1 tablet 1/2-hour before procedure (Patient not taking: Reported on 2024) 3 tablet 0    PARoxetine 20 MG Oral Tab Take 1 tablet (20 mg total) by mouth every morning. (Patient not taking: Reported on 2024)      rosuvastatin 10 MG Oral Tab Take 1 tablet (10 mg total) by mouth nightly. 90 tablet 3    PARoxetine 10 MG Oral Tab Take 1 tablet (10 mg total) by mouth  every morning.         Allergies:  Allergies[1]      ROS:       PHYSICAL EXAM:   Digital prostate exam demonstrates a normal sphincter tone, prostate which is 55 g smooth symmetric without masses or nodules.     ASSESSMENT/PLAN:   Assessment   Encounter Diagnoses   Name Primary?    BPH with obstruction/lower urinary tract symptoms Yes    Elevated PSA        Recommend:  - Urinalysis and PSA to be done today.  - Follow-up otherwise in 1 year.  -Prescription refill for tamsulosin provided.    I spent a total of 25 minutes with patient more than half the time in face-to-face discussion.         Orders This Visit:  Orders Placed This Encounter   Procedures    Urinalysis, Routine    PSA Diagnostic [E]       Meds This Visit:  Requested Prescriptions      No prescriptions requested or ordered in this encounter       Imaging & Referrals:  None     10/25/2024  Olena Hartman MD               [1]   Allergies  Allergen Reactions    Other ANAPHYLAXIS     Bee Sting

## 2024-11-25 ENCOUNTER — TELEPHONE (OUTPATIENT)
Dept: FAMILY MEDICINE CLINIC | Facility: CLINIC | Age: 64
End: 2024-11-25

## 2024-11-26 RX ORDER — TAMSULOSIN HYDROCHLORIDE 0.4 MG/1
0.4 CAPSULE ORAL
Qty: 60 CAPSULE | Refills: 0 | OUTPATIENT
Start: 2024-11-26

## 2024-11-26 NOTE — TELEPHONE ENCOUNTER
Disp Refills Start End    tamsulosin 0.4 MG Oral Cap 90 capsule 3 10/25/2024 10/20/2025    Sig - Route: Take 1 capsule (0.4 mg total) by mouth daily. Take 1/2 hour following the same meal each day - Oral    Sent to pharmacy as: Tamsulosin HCl 0.4 MG Oral Capsule (Flomax)    E-Prescribing Status: Receipt confirmed by pharmacy (10/25/2024  9:11 AM CDT)      Pharmacy    [id number] - [place], [place] - [address] [place] [address] AT [facility], [phone], [phone]

## 2024-12-16 ENCOUNTER — TELEPHONE (OUTPATIENT)
Dept: FAMILY MEDICINE CLINIC | Facility: CLINIC | Age: 64
End: 2024-12-16

## 2024-12-16 NOTE — TELEPHONE ENCOUNTER
Lvm to schedule MA super   The pt declined to have the procedure done at this time after he was transported to IR. Pt was received back in short stay and his IV was dc'd.

## 2024-12-17 RX ORDER — LOSARTAN POTASSIUM 25 MG/1
25 TABLET ORAL DAILY
Qty: 30 TABLET | Refills: 0 | Status: SHIPPED | OUTPATIENT
Start: 2024-12-17

## 2024-12-17 NOTE — TELEPHONE ENCOUNTER
Please review. Protocol failed/ No protocol.    Requested Prescriptions   Pending Prescriptions Disp Refills    LOSARTAN 25 MG Oral Tab [Pharmacy Med Name: LOSARTAN 25MG TABLETS] 90 tablet 0     Sig: TAKE 1 TABLET(25 MG) BY MOUTH DAILY       Hypertension Medications Protocol Failed - 12/17/2024  9:27 AM        Failed - CMP or BMP in past 12 months        Failed - Last BP reading less than 140/90     BP Readings from Last 1 Encounters:   09/06/24 144/81               Failed - In person appointment or virtual visit in the past 12 mos or appointment in next 3 mos     Recent Outpatient Visits              1 month ago [facility] obstruction/[facility] tract [facility]    [facility]    3 months ago Rheumatoid arthritis of multiple sites with negative rheumatoid factor ([facility]    [facility]    [facility]    6 months ago S[facility]P lumbar fusion    [facility]    [facility] Visit    9 months ago Rheumatoid arthritis of multiple sites with negative rheumatoid factor ([facility])    [facility], Ayah Marrero MD    Office Visit    10 months ago S/P lumbar fusion    [facility] [place] [facility], Tibor, MD    Office Visit                      Passed - EGFRCR or GFRNAA > 50     GFR Evaluation  EGFRCR: 98 , resulted on 9/5/2024

## 2024-12-24 RX ORDER — LOSARTAN POTASSIUM 25 MG/1
25 TABLET ORAL DAILY
Qty: 90 TABLET | Refills: 0 | OUTPATIENT
Start: 2024-12-24

## 2025-01-15 ENCOUNTER — TELEPHONE (OUTPATIENT)
Dept: FAMILY MEDICINE CLINIC | Facility: CLINIC | Age: 65
End: 2025-01-15

## 2025-01-20 RX ORDER — LOSARTAN POTASSIUM 25 MG/1
25 TABLET ORAL DAILY
Qty: 30 TABLET | Refills: 0 | Status: SHIPPED | OUTPATIENT
Start: 2025-01-20

## 2025-01-20 NOTE — TELEPHONE ENCOUNTER
Please kindly review; protocol failed or medication has no protocol attached.     Multiple attempts for patient to schedule their Medicare annual wellness exam. Patient has yet to scheduled.    Recent Visits  Date Type Provider Dept   10/06/23 Office Visit Alejandro Stuart DO [facility]     Future Appointments  None    MyChart message sent to patient to schedule an office visit with PCP.   Routed to Call Center to call patient and make an appointment.     Requested Prescriptions   Pending Prescriptions Disp Refills    LOSARTAN 25 MG Oral Tab [Pharmacy Med Name: LOSARTAN 25MG TABLETS] 90 tablet 0     Sig: TAKE 1 TABLET(25 MG) BY MOUTH DAILY       Hypertension Medications Protocol Failed - 1/20/2025 11:32 AM        Failed - CMP or BMP in past 12 months        Failed - Last BP reading less than 140/90     BP Readings from Last 1 Encounters:   09/06/24 144/81               Failed - In person appointment or virtual visit in the past 12 mos or appointment in next 3 mos     Recent Outpatient Visits              2 months ago [facility] with obstruction/lower urinary tract symptoms    [facility]    [facility]    4 months ago Rheumatoid arthritis of multiple sites with negative rheumatoid factor ([facility]    7 months ago [facility]    [facility]    10 months ago Rheumatoid arthritis of multiple sites with negative rheumatoid factor ([facility]    [facility]    11 months ago S/P lumbar fusion    [facility] [place] Boco, Tibor, MD    Office Visit                      Passed - EGFRCR or GFRNAA > 50     GFR Evaluation  EGFRCR: 98 , resulted on 9/5/2024          Passed - Medication is active on med list                Recent Outpatient Visits              2 months ago BPH with obstruction/lower urinary tract symptoms    [facility] [place] Olena Hartman MD    Office Visit    4 months ago Rheumatoid arthritis of multiple sites with negative rheumatoid factor ([facility])    [facility], Ayah Marrero MD    Office Visit    7 months ago S/P lumbar fusion    [facility], Seamus Azul MD    Office Visit    10 months ago Rheumatoid arthritis of multiple sites with negative rheumatoid factor ([facility])    [facility], Ayah Marrero MD    Office Visit    11 months ago S/P lumbar fusion    [facility] [place], Tibor, MD    Office Visit

## 2025-01-20 NOTE — TELEPHONE ENCOUNTER
Please call patient to assist in making an appointment. Thank you      Last office visit: 10/6/2023    Patient overdue for their Medicare annual wellness exam.    **Please attempt all available phone numbers in patient's chart. This includes alternative numbers and contacts on patient's release of information. Thank you.

## 2025-01-21 ENCOUNTER — TELEPHONE (OUTPATIENT)
Dept: INTERNAL MEDICINE CLINIC | Facility: CLINIC | Age: 65
End: 2025-01-21

## 2025-02-06 NOTE — TELEPHONE ENCOUNTER
Chart reviewed, pt allergic to bee stings with anaphylaxis reaction. Epi pen refilled.     Refill Protocol Appointment Criteria  · Appointment scheduled in the past 6 months or in the next 3 months  Recent Outpatient Visits            4 months ago Medicare
Patient wife called and stated need a new refill request for medication for the following      Epipen- .3mg auto injector  Requesting a call back-
Yes

## 2025-02-18 ENCOUNTER — TELEPHONE (OUTPATIENT)
Dept: FAMILY MEDICINE CLINIC | Facility: CLINIC | Age: 65
End: 2025-02-18

## 2025-02-25 RX ORDER — LOSARTAN POTASSIUM 25 MG/1
25 TABLET ORAL DAILY
Qty: 15 TABLET | Refills: 0 | Status: SHIPPED | OUTPATIENT
Start: 2025-02-25

## 2025-02-25 NOTE — TELEPHONE ENCOUNTER
Please review; protocol failed/No Protocol      Last Office Visit: 10/06/2023  [employer] message sent to patient to schedule an office visit.  Will route to Call Center to call to make an appointment.

## 2025-02-27 NOTE — TELEPHONE ENCOUNTER
Requested Prescriptions     Pending Prescriptions Disp Refills    LEFLUNOMIDE 20 MG Oral Tab [Pharmacy Med Name: LEFLUNOMIDE 20 MG TABLET] 90 tablet 1     Sig: TAKE 1 TABLET BY MOUTH EVERY DAY     LOV: 9/6/24  No future appointments.  Labs:   Component      Latest Ref Rng 9/5/2024   WBC      4.0 - 11.0 x10(3) uL 4.3    RBC      3.80 - 5.80 x10(6)uL 5.29    Hemoglobin      13.0 - 17.5 g/dL 13.8    Hematocrit      39.0 - 53.0 % 43.8    MCV      80.0 - 100.0 fL 82.8    MCH      26.0 - 34.0 pg 26.1    MCHC      31.0 - 37.0 g/dL 31.5    RDW-SD      35.1 - 46.3 fL 41.0    RDW      11.0 - 15.0 % 13.6    Platelet Count      150.0 - 450.0 10(3)uL 213.0    Prelim Neutrophil Abs      1.50 - 7.70 x10 (3) uL 2.75    Neutrophils Absolute      1.50 - 7.70 x10(3) uL 2.75    Lymphocytes Absolute      1.00 - 4.00 x10(3) uL 1.01    Monocytes Absolute      0.10 - 1.00 x10(3) uL 0.39    Eosinophils Absolute      0.00 - 0.70 x10(3) uL 0.07    Basophils Absolute      0.00 - 0.20 x10(3) uL 0.04    Immature Granulocyte Absolute      0.00 - 1.00 x10(3) uL 0.01    Neutrophils %      % 64.5    Lymphocytes %      % 23.7    Monocytes %      % 9.1    Eosinophils %      % 1.6    Basophils %      % 0.9    Immature Granulocyte %      % 0.2    CREATININE      0.70 - 1.30 mg/dL 0.79    EGFR      >=60 mL/min/1.73m2 98    SED RATE      0 - 20 mm/Hr 16    C-REACTIVE PROTEIN      <1.00 mg/dL 0.60    AST (SGOT)      <34 U/L 26    ALT (SGPT)      10 - 49 U/L 22    Albumin      3.2 - 4.8 g/dL 4.7             ASSESSMENT/PLAN:      Polyarthralgias likely Seronegative RA vs PMR- improved   - He was diagnosed with seronegative RA back in 2011 when he was found to have synovial cell count of 1700 and elevated inflammatory markers. Now, US of the right hand was done showing no evidence of synovitis  - He was previously on methotrexate which did not help  - He is on leflunomide 20 mg daily and doing well has some soreness in his shoulders but overall full range of  motion.  No swelling in his hands  - He has been off prednisone since June 2024  - Blood work reviewed today and normal  - blood work every 3 mos      Lumbar disc herniation- stable   - s/p surgery in October 2021 with Dr. Staples  - s/p lumbar fusion Nov 2023     Right ulnar neuropathy- improved   - Having some numbness in his right fourth and fifth fingers, no pain.  No elbow pain.  Advised that we can do an EMG/nerve test.  He wants to hold off on that     Positive Quanteferon TB  - Patient was seen by [facility].  He was placed on rifampin in 5/62020 and completed treatment     Pt will f/u in 6 mos , he will still get blood work every 3 mos     There is a longitudinal care relationship with me, the care plan reflects the ongoing nature of the continuous relationship of care, and the medical record indicates that there is ongoing treatment of a serious/complex medical condition which I am currently managing.  is Applicable.      Ayah Gregory MD  9/6/2024  9:40 AM

## 2025-02-28 RX ORDER — LEFLUNOMIDE 20 MG/1
20 TABLET ORAL DAILY
Qty: 90 TABLET | Refills: 1 | Status: SHIPPED | OUTPATIENT
Start: 2025-02-28

## 2025-02-28 NOTE — TELEPHONE ENCOUNTER
Called and left a VM to patient, inform patient he needs to complete labs every 3 months, due now and schedule a follow up appointment with Dr. Gregory. Also inform him our department will be moving 3/17/25.  A [employer] message was also sent to patient.    Me to Valdez Rose   AM      2/27/25  9:07 AM  Yobani Kellogg,     This is a courtesy reminder to make your 6 months follow up appointment with Dr. Gregory with completion of monitoring labs due every 3 months. Our providers are booking 3-6 months out and encouraging to make appointments after each visit to prevent any delays. Your standing lab orders are in system to complete. Please call [phone] to schedule your next available follow up appointment or schedule one via My Chart.      Vickie Rock,[facility]    [facility]       [phone]  [email or web address]    This [employer] message has not been read.

## 2025-03-20 ENCOUNTER — TELEPHONE (OUTPATIENT)
Dept: FAMILY MEDICINE CLINIC | Facility: CLINIC | Age: 65
End: 2025-03-20

## 2025-03-21 ENCOUNTER — TELEPHONE (OUTPATIENT)
Dept: FAMILY MEDICINE CLINIC | Facility: CLINIC | Age: 65
End: 2025-03-21

## 2025-03-21 NOTE — TELEPHONE ENCOUNTER
Reached patient for medication adherence consult. Patient is past due for refill on losartan, last filled 1/20/25 for #30.     Patient tells me he is still taking the losartan. He states at one point he wasn't taking the medication so does have plenty left currently. Did provide education on indication for losartan and stressed the importance of taking this medication consistently everyday to get the most benefit.     Let patient know he was only given a #30 day supply because he is due for an appointment. Asked if I could assist patient in getting appointment scheduled. Patient declined. Strongly encouraged patient to contact clinic to schedule visit well before he runs out of medication.     Also asked about the rosuvastatin and patient states he doesn't have this medication and is not taking it. He is not sure why he doesn't have it and does not believe it was discontinued. Again, encouraged patient to contact office to schedule appointment. Let patient know I would follow back up in ~2 weeks to check in on patient. Patient denies any other questions about medications at this time.

## 2025-05-01 ENCOUNTER — TELEPHONE (OUTPATIENT)
Dept: FAMILY MEDICINE CLINIC | Facility: CLINIC | Age: 65
End: 2025-05-01

## 2025-05-28 ENCOUNTER — TELEPHONE (OUTPATIENT)
Dept: FAMILY MEDICINE CLINIC | Facility: CLINIC | Age: 65
End: 2025-05-28

## 2025-05-30 NOTE — ANESTHESIA POSTPROCEDURE EVALUATION
Patient: Grupo Arias    Procedure Summary     Date:  02/10/20 Room / Location:  [facility] ENDOSCOPY 05 / [facility] ENDOSCOPY    Anesthesia Start:  3319 Anesthesia Stop:  0760    Procedure:  COLONOSCOPY (N/A ) Diagnosis:       Colon cancer screening      History of Pt to port chair for PF. Port accessed using sterile technique and flushed with good blood return noted. No labs collected. Port flushed with saline and heparin prior to needle removal. Pt aware of next appt and discharged.

## 2025-06-16 ENCOUNTER — TELEPHONE (OUTPATIENT)
Dept: CASE MANAGEMENT | Age: 65
End: 2025-06-16

## 2025-06-16 DIAGNOSIS — M51.26 LUMBAR DISC HERNIATION: Primary | ICD-10-CM

## 2025-06-16 NOTE — TELEPHONE ENCOUNTER
Dr. Stuart, **    Patient called requesting referral to Dr. Staples.     Pended referral please review diagnosis and sign off if you agree.    Thank you.  Bella Vasquez  [facility]

## 2025-06-18 ENCOUNTER — HOSPITAL ENCOUNTER (OUTPATIENT)
Dept: GENERAL RADIOLOGY | Facility: HOSPITAL | Age: 65
Discharge: HOME | End: 2025-06-18
Payer: MEDICARE

## 2025-06-18 ENCOUNTER — OFFICE VISIT (OUTPATIENT)
Dept: SURGERY | Facility: CLINIC | Age: 65
End: 2025-06-18
Payer: MEDICARE

## 2025-06-18 VITALS
WEIGHT: 206 LBS | BODY MASS INDEX: 33.11 KG/M2 | SYSTOLIC BLOOD PRESSURE: 175 MMHG | HEIGHT: 66 IN | HEART RATE: 84 BPM | OXYGEN SATURATION: 99 % | DIASTOLIC BLOOD PRESSURE: 89 MMHG

## 2025-06-18 DIAGNOSIS — M47.27 LUMBOSACRAL SPONDYLOSIS WITH RADICULOPATHY: ICD-10-CM

## 2025-06-18 DIAGNOSIS — Z98.1 S/P LUMBAR FUSION: Primary | ICD-10-CM

## 2025-06-18 DIAGNOSIS — Z98.1 S/P LUMBAR FUSION: ICD-10-CM

## 2025-06-18 PROCEDURE — 99214 OFFICE O/P EST MOD 30 MIN: CPT

## 2025-06-18 PROCEDURE — 1159F MED LIST DOCD IN RCRD: CPT

## 2025-06-18 PROCEDURE — 3008F BODY MASS INDEX DOCD: CPT

## 2025-06-18 PROCEDURE — 3077F SYST BP >= 140 MM HG: CPT

## 2025-06-18 PROCEDURE — 1160F RVW MEDS BY RX/DR IN RCRD: CPT

## 2025-06-18 PROCEDURE — 72110 X-RAY EXAM L-2 SPINE 4/>VWS: CPT

## 2025-06-18 PROCEDURE — 1125F AMNT PAIN NOTED PAIN PRSNT: CPT

## 2025-06-18 PROCEDURE — 3079F DIAST BP 80-89 MM HG: CPT

## 2025-06-18 RX ORDER — ALPRAZOLAM 0.5 MG
0.5 TABLET ORAL ONCE AS NEEDED
Qty: 2 TABLET | Refills: 0 | Status: SHIPPED | OUTPATIENT
Start: 2025-06-18 | End: 2025-06-18

## 2025-06-18 RX ORDER — GABAPENTIN 300 MG/1
300 CAPSULE ORAL 3 TIMES DAILY
Qty: 30 CAPSULE | Refills: 0 | Status: SHIPPED | OUTPATIENT
Start: 2025-06-18 | End: 2025-06-28

## 2025-06-18 NOTE — PROGRESS NOTES
[facility] Neurosurgery        [facility]      [address]  [address]  [place] [address]    PHONE  [phone]          FAX  [phone]    [email or web address]      OFFICE FOLLOW-UP NOTE            Valdez Rose    : 1960    MRN: [id number]  CSN: [id number]      PCP: Alejandro Stuart DO  Referring Provider: Alejandro Stuart    Insurance: Payor: [facility] / Plan: [facility] / Product Type: Medicare /           Date of Visit: 2025    Reason for Visit:   Chief Complaint    Follow - Up                         History of Present Care:  Valdez Rose is a a(n) 65 year old, male with past medical history of L4-5 interbody fusion approximately 18 months ago.  Patient was evaluated in our office 1 year ago with a radiating left lower extremity pain to the calf.  He was initiated on gabapentin 300 mg 3 times daily and provided an MRI order.  He did not complete this because the gabapentin was working very well and his pain was quite minimal with the medication.  Overall he has mild left-sided low back pain.  He stopped utilizing the gabapentin and over the last 2 weeks he feels the pain has become much more considerable in the left calf.  He denies weakness numbness or bowel or bladder changes.  He is very hesitant of completing an MRI as he is extremely claustrophobic and has not tolerated them well in the past.      History:  .  Past Medical History[1]   Past Surgical History[2]   Family History[3]   Social History     Socioeconomic History    Marital status:      Spouse name: Not on file    Number of children: Not on file    Years of education: Not on file    Highest education level: Not on file   Occupational History    Not on file   Tobacco Use    Smoking status: Former     Current packs/day: 0.00     Average packs/day: 0.5 packs/day for 20.0 years (10.0 ttl pk-yrs)     Types: Cigarettes     Start date:  1996     Quit date: 2016     Years since quittin.4    Smokeless tobacco: Never    Tobacco comments:     I want a lung scan my sister  of Lung Cancer.   Vaping Use    Vaping status: Never Used   Substance and Sexual Activity    Alcohol use: Not Currently     Alcohol/week: 2.0 standard drinks of alcohol     Types: 2 Standard drinks or equivalent per week     Comment: social    Drug use: Never    Sexual activity: Not on file   Other Topics Concern     Service Not Asked    Blood Transfusions Not Asked    Caffeine Concern Yes     Comment: Coffee, 4 cups daily.    Occupational Exposure Not Asked    Hobby Hazards Not Asked    Sleep Concern Not Asked    Stress Concern Not Asked    Weight Concern Not Asked    Special Diet Not Asked    Back Care Not Asked    Exercise Not Asked    Bike Helmet Not Asked    Seat Belt Not Asked    Self-Exams Not Asked    Left Handed No    Right Handed Yes    Currently spends a great deal of time in the sun No    Past Sunlamp Treatments for Acne Not Asked    History of tanning No    Hx of Spending Great Deal of Time in Sun No    Bad sunburns in the past No    Tanning Salons in the Past No    Hx of Radiation Treatments No    Regular use of sun block Not Asked   Social History Narrative    Not on file     Social Drivers of Health     Food Insecurity: No Food Insecurity (2023)    Food Insecurity     Food Insecurity: Never true   Transportation Needs: No Transportation Needs (11/10/2023)    Transportation Needs     Lack of Transportation: No   Stress: Not on file   Housing Stability: Low Risk  (2023)    Housing Stability     Housing Instability: No     Housing Instability Emergency: Not on file     Crib or Bassinette: Not on file        Allergies:  Allergies[4]      Medications:  Current Medications[5]     Review of Systems:  A 10-point system was reviewed.  Pertinent positives and negatives are noted in HPI.      Physical Exam:  BP (!) 182/102 (BP Location: Right  arm, Patient Position: Sitting, Cuff Size: adult)   Pulse 74   Ht 66\"   Wt 206 lb (93.4 kg)   SpO2 97%   BMI 33.25 kg/m²         Neurological Exam:    AAOx3, following commands  PERRLA  EOMI  Face symmetrical  Tongue midline  Hearing symmetrical and intact to finger rub    No rhinorrhea or otorrhea    Romberg negative    Motor Strength:  5 out of 5 in bilateral lower extremities    Sensation to light touch:  There is pain to light touch in the left calf    DTRs:  Left lower extremity Achilles 1+ out of 4      Abdomen:  Soft, non-distended, non-tender, with no rebound or guarding.  No peritoneal signs.     Extremities:  Non-tender, no lower extremity edema noted.      Labs:  CBC:  Lab Results   Component Value Date    WBC 4.3 09/05/2024    HGB 13.8 09/05/2024    HCT 43.8 09/05/2024    MCV 82.8 09/05/2024    .0 09/05/2024      BMG:  Lab Results   Component Value Date     (L) 10/09/2023    K 4.5 10/09/2023    CO2 27.0 10/09/2023     10/09/2023    BUN 22 (H) 10/09/2023      INR:  Lab Results   Component Value Date    INR 0.98 10/09/2023    INR 0.97 09/08/2021          Diagnostics:  No new images to review    Diagnosis:  1. S/P lumbar fusion  - MRI SPINE LUMBAR (CPT=72148); Future  - XR LUMBAR SPINE AP LAT FLEX EXT (CPT=72110); Future  - ALPRAZolam (XANAX) 0.5 MG Oral Tab; Take 1 tablet (0.5 mg total) by mouth once as needed (Once 30 minutes prior to MRI. One additional tablet at time of MRI if needed.).  Dispense: 2 tablet; Refill: 0  - gabapentin 300 MG Oral Cap; Take 1 capsule (300 mg total) by mouth 3 (three) times daily for 10 days.  Dispense: 30 capsule; Refill: 0    2. Lumbosacral spondylosis with radiculopathy  - MRI SPINE LUMBAR (CPT=72148); Future  - XR LUMBAR SPINE AP LAT FLEX EXT (CPT=72110); Future  - ALPRAZolam (XANAX) 0.5 MG Oral Tab; Take 1 tablet (0.5 mg total) by mouth once as needed (Once 30 minutes prior to MRI. One additional tablet at time of MRI if needed.).  Dispense: 2  tablet; Refill: 0  - gabapentin 300 MG Oral Cap; Take 1 capsule (300 mg total) by mouth 3 (three) times daily for 10 days.  Dispense: 30 capsule; Refill: 0      Assessment/Plan:  Valdez Rose returns to my office 18 months status post L4-5 TLIF.  His most recent imaging is from 1 year ago it was an x-ray which appeared to show stable postoperative L4-5 changes with transpedicular screws.  Patient has had waxing and waning left lower extremity pain to the calf, concerning for an S1 radiculopathy.  At this time we recommend to complete an MRI of the lumbar spine to rule out an adjacent level disease L5-S1 disc herniation.  I will provide patient with gabapentin 300 mg 3 times daily as well as perioperative Xanax to help with his extreme claustrophobia during the MRI.  Will also complete flexion and extension x-rays as we have not evaluated his L4-5 fusion recently.      More than 30 minutes were spent during this visit and the coordination of this patient's care. All questions and concerns were addressed. We appreciate the opportunity to participate in the care of this patient. Please do not hesitate to call our office ([phone]) with any issues.    This note has been dictated utilizing voice recognition software. Unfortunately, this may lead to occasional typographical errors. If there are any questions regarding this, please do not hesitate to contact our office.           Edi Euceda PA-C    6/18/2025  10:47 AM       [1]   Past Medical History:   Anxiety    Back problem    Broken neck (HCC)    Chronic pain    Essential hypertension    High blood pressure    High cholesterol    Rheumatoid arteritis (HCC)   [2]   Past Surgical History:  Procedure Laterality Date    Colonoscopy N/A 02/10/2020    Procedure: COLONOSCOPY;  Surgeon: Rafael Saldana MD;  Location: [facility] ENDOSCOPY    Knee arthroscopy      Right knee arthroscopy    Other surgical history      Arthrocentesis of the right knee joint    Spine  surgery procedure unlisted      Surgery - external      LUMBAR MICRODISCECTOMY   [3]   Family History  Problem Relation Age of Onset    Other (pulmo) Father 75        leukemnia    Arthritis Mother     Heart Disorder Mother     Cancer Sister     Heart Disorder Sister     Heart Disorder Brother    [4]   Allergies  Allergen Reactions    Other ANAPHYLAXIS     Bee Sting    [5]   Current Outpatient Medications   Medication Sig Dispense Refill    ALPRAZolam (XANAX) 0.5 MG Oral Tab Take 1 tablet (0.5 mg total) by mouth once as needed (Once 30 minutes prior to MRI. One additional tablet at time of MRI if needed.). 2 tablet 0    gabapentin 300 MG Oral Cap Take 1 capsule (300 mg total) by mouth 3 (three) times daily for 10 days. 30 capsule 0    LEFLUNOMIDE 20 MG Oral Tab TAKE 1 TABLET BY MOUTH EVERY DAY 90 tablet 1    losartan 25 MG Oral Tab Take 1 tablet (25 mg total) by mouth daily. 15 tablet 0    tamsulosin 0.4 MG Oral Cap Take 1 capsule (0.4 mg total) by mouth daily. Take 1/2 hour following the same meal each day 90 capsule 3

## 2025-06-18 NOTE — PROGRESS NOTES
Established patient presents to clinic complaining of left leg pain radiating sometimes into left hip. These symptoms began about 2 weeks ago. Patient underwent Left L4-5 transforaminal lumbar interbody fusion surgery on 11/08/2023 with Dr. Staples. He states he was doing well after surgery and Gabapentin was helping but completed it. He was doing well up until 2 weeks ago. He has left foot numbness and severe burning sensation and aching pain in left calf. Patient is taking Tylenol and using ointments for pain relief.

## 2025-06-23 ENCOUNTER — TELEPHONE (OUTPATIENT)
Dept: SURGERY | Facility: CLINIC | Age: 65
End: 2025-06-23

## 2025-06-23 NOTE — TELEPHONE ENCOUNTER
[facility] office received clinical notes request from [facility].   Patient has MRI Lumbar Spine WO Contrast scheduled on 06/26/2025.     Clinical notes from 06/18/2025 E-Faxed to [facility] at [phone].

## 2025-07-01 ENCOUNTER — OFFICE VISIT (OUTPATIENT)
Dept: SURGERY | Facility: CLINIC | Age: 65
End: 2025-07-01
Payer: MEDICARE

## 2025-07-01 ENCOUNTER — TELEPHONE (OUTPATIENT)
Dept: SURGERY | Facility: CLINIC | Age: 65
End: 2025-07-01

## 2025-07-01 VITALS
WEIGHT: 206 LBS | HEART RATE: 89 BPM | SYSTOLIC BLOOD PRESSURE: 144 MMHG | DIASTOLIC BLOOD PRESSURE: 83 MMHG | BODY MASS INDEX: 33 KG/M2

## 2025-07-01 DIAGNOSIS — M47.27 LUMBOSACRAL SPONDYLOSIS WITH RADICULOPATHY: ICD-10-CM

## 2025-07-01 DIAGNOSIS — Z98.1 S/P LUMBAR FUSION: Primary | ICD-10-CM

## 2025-07-01 PROCEDURE — 3077F SYST BP >= 140 MM HG: CPT | Performed by: NEUROLOGICAL SURGERY

## 2025-07-01 PROCEDURE — 1159F MED LIST DOCD IN RCRD: CPT | Performed by: NEUROLOGICAL SURGERY

## 2025-07-01 PROCEDURE — 1160F RVW MEDS BY RX/DR IN RCRD: CPT | Performed by: NEUROLOGICAL SURGERY

## 2025-07-01 PROCEDURE — 3079F DIAST BP 80-89 MM HG: CPT | Performed by: NEUROLOGICAL SURGERY

## 2025-07-01 PROCEDURE — 99214 OFFICE O/P EST MOD 30 MIN: CPT | Performed by: NEUROLOGICAL SURGERY

## 2025-07-01 RX ORDER — GABAPENTIN 300 MG/1
300 CAPSULE ORAL 3 TIMES DAILY
Qty: 30 CAPSULE | Refills: 0 | Status: SHIPPED | OUTPATIENT
Start: 2025-07-01 | End: 2025-07-11

## 2025-07-01 NOTE — PROGRESS NOTES
Patient returns to clinic for review of imaging, and s/p 18 months L4-5 TLIF. Patient was last seen 6/18/25 by Jordan. Patient is doing better he states; Patient has been taking Gabapentin 300 mg TID- he states this has helped. Discomfort in the legs- aching 3/10 for pain.

## 2025-07-01 NOTE — PROGRESS NOTES
SEYMOUR CHÁVEZ M.D., F.A.A.N.S.     of Neurosurgery  [facility]  Board Certified Neurosurgeon                          [facility], [facility], [facility] Neurosurgery        [address]  [address]  [place] [address]    PHONE  [phone]          FAX  [phone]    [email or web address]      OFFICE FOLLOW-UP NOTE      Valdez Rose    : 1960    MRN: [id number]  CSN: [id number]      PCP: Alejandro Stuart DO  Referring Provider: Alejandro Stuart    Insurance: Payor: [facility] [facility] / Plan: [employer] HMO / Product Type: Medicare /     Date of Visit: 2025    Reason for Visit:                     History of Present Care:  Valdez Rose is a a(n) 65 year old, male.  Patient is following up today after recent exacerbation of lower back and left lower extremity pain.  He is status post L4-5 interbody fusion.  Neurontin was prescribed and he is now significantly improved with the pain of approximately 3 out of 10.  It radiates in the posterior aspect of his left lower extremity.  He is quite happy with how he feels.  He had an MRI.      History:  .  Past Medical History[1]   Past Surgical History[2]   Family History[3]   Social History     Socioeconomic History    Marital status:      Spouse name: Not on file    Number of children: Not on file    Years of education: Not on file    Highest education level: Not on file   Occupational History    Not on file   Tobacco Use    Smoking status: Former     Current packs/day: 0.00     Average packs/day: 0.5 packs/day for 20.0 years (10.0 ttl pk-yrs)     Types: Cigarettes     Start date: 1996     Quit date: 2016     Years since quittin.5    Smokeless tobacco: Never    Tobacco comments:     I want a lung scan my sister  of Lung Cancer.   Vaping Use    Vaping status: Never Used   Substance and Sexual  Activity    Alcohol use: Not Currently     Alcohol/week: 2.0 standard drinks of alcohol     Types: 2 Standard drinks or equivalent per week     Comment: social    Drug use: Never    Sexual activity: Not on file   Other Topics Concern     Service Not Asked    Blood Transfusions Not Asked    Caffeine Concern Yes     Comment: Coffee, 4 cups daily.    Occupational Exposure Not Asked    Hobby Hazards Not Asked    Sleep Concern Not Asked    Stress Concern Not Asked    Weight Concern Not Asked    Special Diet Not Asked    Back Care Not Asked    Exercise Not Asked    Bike Helmet Not Asked    Seat Belt Not Asked    Self-Exams Not Asked    Left Handed No    Right Handed Yes    Currently spends a great deal of time in the sun No    Past Sunlamp Treatments for Acne Not Asked    History of tanning No    Hx of Spending Great Deal of Time in Sun No    Bad sunburns in the past No    Tanning Salons in the Past No    Hx of Radiation Treatments No    Regular use of sun block Not Asked   Social History Narrative    Not on file     Social Drivers of Health     Food Insecurity: No Food Insecurity (11/8/2023)    Food Insecurity     Food Insecurity: Never true   Transportation Needs: No Transportation Needs (11/10/2023)    Transportation Needs     Lack of Transportation: No   Stress: Not on file   Housing Stability: Low Risk  (11/8/2023)    Housing Stability     Housing Instability: No     Housing Instability Emergency: Not on file     Crib or Bassinette: Not on file        Allergies:  Allergies[4]      Medications:  Current Medications[5]     Review of Systems:  A 10-point system was reviewed.  Pertinent positives and negatives are noted in HPI.      Physical Exam:  /83 (BP Location: Right arm, Patient Position: Sitting, Cuff Size: large)   Pulse 89   Wt 206 lb (93.4 kg)   BMI 33.25 kg/m²         Neurological Exam:    AAOx3, following commands  PERRLA  EOMI  Face symmetrical  Tongue midline  Hearing symmetrical and intact  to finger rub    No rhinorrhea or otorrhea    Romberg negative    Motor Strength:  Symmetrical and intact in the lower extremities    Sensation to light touch:  Symmetrical and intact in the lower extremities    Incision:  Clean dry and intact    Abdomen:  Soft, non-distended, non-tender, with no rebound or guarding.  No peritoneal signs.     Extremities:  Non-tender, no lower extremity edema noted.      Labs:  CBC:  Lab Results   Component Value Date    WBC 4.3 09/05/2024    HGB 13.8 09/05/2024    HCT 43.8 09/05/2024    MCV 82.8 09/05/2024    .0 09/05/2024      BMG:  Lab Results   Component Value Date     (L) 10/09/2023    K 4.5 10/09/2023    CO2 27.0 10/09/2023     10/09/2023    BUN 22 (H) 10/09/2023      INR:  Lab Results   Component Value Date    INR 0.98 10/09/2023    INR 0.97 09/08/2021          Diagnostics:  I reviewed an MRI of the lumbar spine with evidence of interbody fusion at L4-5.  Interpretation is limited by metal artifact.  There is proper alignment of the lumbar vertebral bodies with congenital narrowing and mild to moderate degenerative change.  There is no significant central canal stenosis.    Diagnosis:  1. S/P lumbar fusion    2. Lumbosacral spondylosis with radiculopathy      Assessment/Plan:  Our patient is significantly improved for Neurontin.  We will refill his prescription.  He is also encouraged to continue exercising, to stay active and to lose some of his weight in order to prevent these episodes from happening.  In the future, we will provide him with a referral to the pain service should he experience another exacerbation.  The MRI was reviewed and no surgical lesion was found at this time.  He will follow-up on a as needed basis.    More than 30 minutes were spent during this visit and the coordination of this patient's care. All questions and concerns were addressed. We appreciate the opportunity to participate in the care of this patient. Please do not hesitate  to call our office ([phone]) with any issues.        Seamus Staples M.D., F.A.A.N.S.    7/1/2025  8:55 AM    This note has been dictated utilizing voice recognition software. Unfortunately, this may lead to occasional typographical errors. If there are any questions regarding this, please do not hesitate to contact our office.        [1]   Past Medical History:   Anxiety    Back problem    Broken neck (HCC)    Chronic pain    Essential hypertension    High blood pressure    High cholesterol    Rheumatoid arteritis (HCC)   [2]   Past Surgical History:  Procedure Laterality Date    Colonoscopy N/A 02/10/2020    Procedure: COLONOSCOPY;  Surgeon: Rafael Saldana MD;  Location: [facility] ENDOSCOPY    Knee arthroscopy      Right knee arthroscopy    Other surgical history      Arthrocentesis of the right knee joint    Spine surgery procedure unlisted      Surgery - external      LUMBAR MICRODISCECTOMY   [3]   Family History  Problem Relation Age of Onset    Other (pulmo) Father 75        leukemnia    Arthritis Mother     Heart Disorder Mother     Cancer Sister     Heart Disorder Sister     Heart Disorder Brother    [4]   Allergies  Allergen Reactions    Other ANAPHYLAXIS     Bee Sting    [5]   Current Outpatient Medications   Medication Sig Dispense Refill    LEFLUNOMIDE 20 MG Oral Tab TAKE 1 TABLET BY MOUTH EVERY DAY 90 tablet 1    losartan 25 MG Oral Tab Take 1 tablet (25 mg total) by mouth daily. 15 tablet 0    tamsulosin 0.4 MG Oral Cap Take 1 capsule (0.4 mg total) by mouth daily. Take 1/2 hour following the same meal each day 90 capsule 3

## 2025-07-01 NOTE — TELEPHONE ENCOUNTER
Patient brought imaging from [facility] to his appointment with Dr. Staples.     06/26/2025 - MRI Lumbar Spine    Films uploaded to PACS.   Disc returned to the patient.

## (undated) DIAGNOSIS — M19.90 INFLAMMATORY ARTHRITIS: ICD-10-CM

## (undated) DIAGNOSIS — Z51.81 ENCOUNTER FOR THERAPEUTIC DRUG MONITORING: ICD-10-CM

## (undated) DEVICE — LAMINECTOMY: Brand: MEDLINE INDUSTRIES, INC.

## (undated) DEVICE — TUBING MEGADYNE SPECULUM

## (undated) DEVICE — C-ARMOR C-ARM EQUIPMENT COVERS CLEAR STERILE UNIVERSAL FIT 12 PER CASE: Brand: C-ARMOR

## (undated) DEVICE — SOLUTION IRRIG 1000ML 0.9% NACL USP BTL

## (undated) DEVICE — Device: Brand: DEFENDO AIR/WATER/SUCTION AND BIOPSY VALVE

## (undated) DEVICE — DISPOSABLE SLIM BIPOLAR FORCEPS, NON-STICK,: Brand: SPETZLER-MALIS

## (undated) DEVICE — SOLUTION IV 1000ML 0.9% NACL PRESERVATIVE

## (undated) DEVICE — TRAY INSTR PWR NUVASIVE

## (undated) DEVICE — PENCIL ES BTTN SWCH W/ TIP HOLSTER E-Z CLN

## (undated) DEVICE — NEEDLE NRV STIM BVL TIP INSUL PEDCL ACCS SYS

## (undated) DEVICE — ENCORE® LATEX MICRO SIZE 8, STERILE LATEX POWDER-FREE SURGICAL GLOVE: Brand: ENCORE

## (undated) DEVICE — ADHESIVE SKIN TOP FOR WND CLSR DERMBND ADV

## (undated) DEVICE — DRAPE SHEET LG

## (undated) DEVICE — SUTURE VCRL SZ 3-0 L18IN ABSRB UD CP-2 L26MM

## (undated) DEVICE — MEDI-VAC NON-CONDUCTIVE SUCTION TUBING 6MM X 1.8M (6FT.) L: Brand: CARDINAL HEALTH

## (undated) DEVICE — BIPOLAR SEALER 23-112-1 AQM 6.0: Brand: AQUAMANTYS™

## (undated) DEVICE — KIT HEMSTAT MTRX 8ML PORCINE GEL HUM THROM

## (undated) DEVICE — INTENDED USE FOR SURGICAL MARKING ON INTACT SKIN, ALSO PROVIDES A PERMANENT METHOD OF IDENTIFYING OBJECTS IN THE OPERATING ROOM: Brand: WRITESITE® PLUS MINI PREP RESISTANT MARKER

## (undated) DEVICE — GAMMEX® PI HYBRID SIZE 8, STERILE POWDER-FREE SURGICAL GLOVE, POLYISOPRENE AND NEOPRENE BLEND: Brand: GAMMEX

## (undated) DEVICE — WRAP COOLING BACK W/NO PILLOW

## (undated) DEVICE — KIT SUR ACCS MAXCESS

## (undated) DEVICE — SCREW SPNL L50MM OD6.5MM 2C REDUC RELINE
Type: IMPLANTABLE DEVICE | Site: BACK | Status: NON-FUNCTIONAL
Removed: 2023-11-08

## (undated) DEVICE — Device: Brand: CUSTOM PROCEDURE KIT

## (undated) DEVICE — SYSTEM DEL GRFT MOD MAS

## (undated) DEVICE — APPLICATOR SKIN PREP 26ML HI LT ORNG 2% CHG

## (undated) DEVICE — SOLUTION IRRIG 1000ML ST H2O AQUALITE PLAS

## (undated) DEVICE — SUTURE MCRYL SZ 3-0 L18IN ABSRB UD L19MM PS-2

## (undated) DEVICE — DRAPE SRG 90X60IN BCK TBL CVR

## (undated) DEVICE — ELECTRODE ES L16.5CM BLDE MPLR OPN APPRCH EZ

## (undated) DEVICE — SNAP KOVER: Brand: UNBRANDED

## (undated) DEVICE — LINE MNTR ADLT SET O2 INTMD

## (undated) DEVICE — 35 ML SYRINGE REGULAR TIP: Brand: MONOJECT

## (undated) NOTE — MR AVS SNAPSHOT
[facility] [address]  [address]  [address] [facility]  [phone]               Thank you for choosing us for your health care visit with Dong Trejo. DO Sade.   We are glad to serve you and happy to provide you with this sum weekend appointments for your exam are available. Walk-in patients are welcome for most exams. [facility]  Diagnostics Main [facility] Parking) (Yellow Parking)  [address]   [address] office. At that time, you will be provided with any authorization numbers or be assured that none are required. You can then schedule your appointment. Failure to obtain required authorization numbers can create reimbursement difficulties for you.     Assoc Visit [email or web address] to learn more.            Visit [facility] online at  [email or web address]

## (undated) NOTE — LETTER
[address]   Date:   8/24/2021     Name:   Grupo Arias    YOB: 1960   MRN:   [id number]       WHERE IS YOUR PAIN NOW? Yann the areas on your body where you feel the described sensations.   Use the appropriate sy

## (undated) NOTE — LETTER
[address]   Date:   7/16/2021     Name:   Elaine Orantes    YOB: 1960   MRN:   [id number]       WHERE IS YOUR PAIN NOW? Yann the areas on your body where you feel the described sensations.   Use the appropriate sy

## (undated) NOTE — LETTER
10/31/2023              Dasha Jennyfer Rose        [address] [facility] [address]         To Whom It May Concern,    May proceed with surgery at an acceptable medical risk       Sincerely,    Sydney Downing.  DO [facility], [address]

## (undated) NOTE — LETTER
[address]   Date:   8/24/2021     Name:   Nicole Maki    YOB: 1960   MRN:   [id number]       WHERE IS YOUR PAIN NOW? Yann the areas on your body where you feel the described sensations.   Use the appropriate sy

## (undated) NOTE — LETTER
[address]   Date:   7/9/2021     Name:   June Barrera    YOB: 1960   MRN:   [id number]       WHERE IS YOUR PAIN NOW? Yann the areas on your body where you feel the described sensations.   Use the appropriate sym

## (undated) NOTE — MR AVS SNAPSHOT
[facility]  [address] [place] [address], [address] [place] [address]  [place]               Thank you for choosing us for your health care visit with Cecilia Villa. DO Sade.   We are glad to serve you and happy to provide you with this summary Phone:  [phone]    - escitalopram 10 MG Tabs      You can get these medications from any pharmacy     Bring a paper prescription for each of these medications    - ClonazePAM 0.5 MG Tabs            Leela     Call the helpdesk for assistance with y Eat plenty of protein, keep the fat content low Sugars:  sodas and sports drinks, candies and desserts   Eat plenty of low-fat dairy products High fat meats and dairy   Choose whole grain products Foods high in sodium   Water is best for hydration Fast ping

## (undated) NOTE — MR AVS SNAPSHOT
[address]  [facility] [address] [address]  [phone]  [phone]               Thank you for choosing us for your health care visit with Ross Croft MD.  We are glad to serve you and happy to provide you wi

## (undated) NOTE — LETTER
9/30/2019    Key Desai        [address] [facility] [address]            Dear Key Desai,      Our records indicate that you are due for an appointment for a Colonoscopy in November 2019, or shortly there after, with Zara Dandy, MD.    Pl

## (undated) NOTE — Clinical Note
Dear Opal Nation,    I had the opportunity to see your patient Gab Jarquin recently. I am sending you this update, and I appreciate your confidence in me to care for your patients.  Please feel free call me with any questions at [phone] or contact me thr

## (undated) NOTE — LETTER
03/11/21    Alejandra Rose  [address]      Dear Payal Higuera records indicate that you have outstanding lab work and or testing that was ordered for you and has not yet been completed:Please schedule appointment with [facility]

## (undated) NOTE — LETTER
José Rose  [address] [place] [address]        Dear Lucille Woodward: On behalf of your primary doctor Misty Hill DO, we have made multiple attempts to reach you by phone over the past several weeks.   Unfortunately, we have not he